# Patient Record
Sex: MALE | Race: WHITE | NOT HISPANIC OR LATINO | Employment: UNEMPLOYED | URBAN - METROPOLITAN AREA
[De-identification: names, ages, dates, MRNs, and addresses within clinical notes are randomized per-mention and may not be internally consistent; named-entity substitution may affect disease eponyms.]

---

## 2023-08-02 ENCOUNTER — OFFICE VISIT (OUTPATIENT)
Dept: GASTROENTEROLOGY | Facility: CLINIC | Age: 58
End: 2023-08-02
Payer: MEDICARE

## 2023-08-02 VITALS
HEIGHT: 70 IN | HEART RATE: 95 BPM | DIASTOLIC BLOOD PRESSURE: 95 MMHG | WEIGHT: 214 LBS | SYSTOLIC BLOOD PRESSURE: 152 MMHG | BODY MASS INDEX: 30.64 KG/M2

## 2023-08-02 DIAGNOSIS — R11.10 REGURGITATION OF FOOD: ICD-10-CM

## 2023-08-02 DIAGNOSIS — K21.9 GASTROESOPHAGEAL REFLUX DISEASE, UNSPECIFIED WHETHER ESOPHAGITIS PRESENT: ICD-10-CM

## 2023-08-02 DIAGNOSIS — Z72.0 TOBACCO ABUSE: ICD-10-CM

## 2023-08-02 DIAGNOSIS — R13.10 DYSPHAGIA, UNSPECIFIED TYPE: Primary | ICD-10-CM

## 2023-08-02 DIAGNOSIS — Z12.11 SCREENING FOR MALIGNANT NEOPLASM OF COLON: ICD-10-CM

## 2023-08-02 PROCEDURE — 1160F RVW MEDS BY RX/DR IN RCRD: CPT | Performed by: NURSE PRACTITIONER

## 2023-08-02 PROCEDURE — 1159F MED LIST DOCD IN RCRD: CPT | Performed by: NURSE PRACTITIONER

## 2023-08-02 PROCEDURE — 99204 OFFICE O/P NEW MOD 45 MIN: CPT | Performed by: NURSE PRACTITIONER

## 2023-08-02 RX ORDER — POLYETHYLENE GLYCOL 3350, SODIUM CHLORIDE, SODIUM BICARBONATE, POTASSIUM CHLORIDE 420; 11.2; 5.72; 1.48 G/4L; G/4L; G/4L; G/4L
4000 POWDER, FOR SOLUTION ORAL ONCE
Qty: 4000 ML | Refills: 0 | Status: SHIPPED | OUTPATIENT
Start: 2023-08-02 | End: 2023-08-02

## 2023-08-02 RX ORDER — ONDANSETRON 4 MG/1
TABLET, ORALLY DISINTEGRATING ORAL
COMMUNITY
Start: 2023-06-26

## 2023-08-02 RX ORDER — ATORVASTATIN CALCIUM 20 MG/1
20 TABLET, FILM COATED ORAL NIGHTLY
COMMUNITY
Start: 2023-05-01

## 2023-08-02 RX ORDER — FUROSEMIDE 40 MG/1
1 TABLET ORAL DAILY
COMMUNITY
Start: 2023-05-01

## 2023-08-02 RX ORDER — PANTOPRAZOLE SODIUM 40 MG/1
40 TABLET, DELAYED RELEASE ORAL
Qty: 60 TABLET | Refills: 2 | Status: SHIPPED | OUTPATIENT
Start: 2023-08-02

## 2023-08-02 RX ORDER — APIXABAN 5 MG/1
1 TABLET, FILM COATED ORAL EVERY 12 HOURS SCHEDULED
COMMUNITY
Start: 2023-05-01

## 2023-08-02 RX ORDER — QUETIAPINE FUMARATE 200 MG/1
TABLET, FILM COATED ORAL
COMMUNITY
Start: 2023-07-21

## 2023-08-02 RX ORDER — ALBUTEROL SULFATE 2.5 MG/3ML
2.5 SOLUTION RESPIRATORY (INHALATION)
COMMUNITY
Start: 2023-05-01 | End: 2024-05-01

## 2023-08-02 RX ORDER — CARVEDILOL 12.5 MG/1
1 TABLET ORAL EVERY 12 HOURS SCHEDULED
COMMUNITY
Start: 2023-05-01

## 2023-08-02 RX ORDER — LISINOPRIL 10 MG/1
1 TABLET ORAL DAILY
COMMUNITY
Start: 2023-05-01

## 2023-08-02 RX ORDER — PANTOPRAZOLE SODIUM 40 MG/1
40 TABLET, DELAYED RELEASE ORAL EVERY MORNING
COMMUNITY
Start: 2023-05-01 | End: 2023-08-02

## 2023-08-11 ENCOUNTER — TELEPHONE (OUTPATIENT)
Dept: GASTROENTEROLOGY | Facility: CLINIC | Age: 58
End: 2023-08-11
Payer: MEDICARE

## 2023-08-11 NOTE — TELEPHONE ENCOUNTER
Attempted to contact pt about possibly moving procedure to 8/16. Left a vm requesting a return call.

## 2023-09-08 ENCOUNTER — APPOINTMENT (OUTPATIENT)
Dept: CT IMAGING | Facility: HOSPITAL | Age: 58
End: 2023-09-08
Payer: MEDICARE

## 2023-09-08 ENCOUNTER — APPOINTMENT (OUTPATIENT)
Dept: GENERAL RADIOLOGY | Facility: HOSPITAL | Age: 58
End: 2023-09-08
Payer: MEDICARE

## 2023-09-08 ENCOUNTER — HOSPITAL ENCOUNTER (OUTPATIENT)
Facility: HOSPITAL | Age: 58
Setting detail: OBSERVATION
Discharge: HOME | End: 2023-09-09
Attending: EMERGENCY MEDICINE | Admitting: INTERNAL MEDICINE
Payer: MEDICARE

## 2023-09-08 DIAGNOSIS — R63.8 UNABLE TO EAT SOLID FOODS: ICD-10-CM

## 2023-09-08 DIAGNOSIS — R11.2 INTRACTABLE NAUSEA AND VOMITING: ICD-10-CM

## 2023-09-08 DIAGNOSIS — E87.6 ACUTE HYPOKALEMIA: ICD-10-CM

## 2023-09-08 DIAGNOSIS — N28.9 ACUTE RENAL INSUFFICIENCY: ICD-10-CM

## 2023-09-08 DIAGNOSIS — K22.2 ESOPHAGEAL STRICTURE: Primary | ICD-10-CM

## 2023-09-08 DIAGNOSIS — Z12.11 SCREENING FOR MALIGNANT NEOPLASM OF COLON: ICD-10-CM

## 2023-09-08 DIAGNOSIS — R63.4 WEIGHT LOSS, UNINTENTIONAL: ICD-10-CM

## 2023-09-08 LAB
ALBUMIN SERPL-MCNC: 4.4 G/DL (ref 3.5–5.2)
ALBUMIN/GLOB SERPL: 1.5 G/DL
ALP SERPL-CCNC: 69 U/L (ref 39–117)
ALT SERPL W P-5'-P-CCNC: 25 U/L (ref 1–41)
ANION GAP SERPL CALCULATED.3IONS-SCNC: 13.8 MMOL/L (ref 5–15)
APTT PPP: 29.5 SECONDS (ref 78–95.9)
AST SERPL-CCNC: 25 U/L (ref 1–40)
BASOPHILS # BLD AUTO: 0.07 10*3/MM3 (ref 0–0.2)
BASOPHILS NFR BLD AUTO: 0.6 % (ref 0–1.5)
BILIRUB SERPL-MCNC: 0.7 MG/DL (ref 0–1.2)
BUN SERPL-MCNC: 36 MG/DL (ref 6–20)
BUN/CREAT SERPL: 22.4 (ref 7–25)
CALCIUM SPEC-SCNC: 9.9 MG/DL (ref 8.6–10.5)
CHLORIDE SERPL-SCNC: 95 MMOL/L (ref 98–107)
CO2 SERPL-SCNC: 27.2 MMOL/L (ref 22–29)
CREAT SERPL-MCNC: 1.61 MG/DL (ref 0.76–1.27)
DEPRECATED RDW RBC AUTO: 44 FL (ref 37–54)
EGFRCR SERPLBLD CKD-EPI 2021: 49 ML/MIN/1.73
EOSINOPHIL # BLD AUTO: 0.17 10*3/MM3 (ref 0–0.4)
EOSINOPHIL NFR BLD AUTO: 1.4 % (ref 0.3–6.2)
ERYTHROCYTE [DISTWIDTH] IN BLOOD BY AUTOMATED COUNT: 13.8 % (ref 12.3–15.4)
GLOBULIN UR ELPH-MCNC: 2.9 GM/DL
GLUCOSE BLDC GLUCOMTR-MCNC: 125 MG/DL (ref 70–99)
GLUCOSE BLDC GLUCOMTR-MCNC: 84 MG/DL (ref 70–99)
GLUCOSE SERPL-MCNC: 116 MG/DL (ref 65–99)
HCT VFR BLD AUTO: 46.3 % (ref 37.5–51)
HGB BLD-MCNC: 16 G/DL (ref 13–17.7)
HOLD SPECIMEN: NORMAL
HOLD SPECIMEN: NORMAL
IMM GRANULOCYTES # BLD AUTO: 0.02 10*3/MM3 (ref 0–0.05)
IMM GRANULOCYTES NFR BLD AUTO: 0.2 % (ref 0–0.5)
INR PPP: 1.06 (ref 0.86–1.15)
LYMPHOCYTES # BLD AUTO: 3.83 10*3/MM3 (ref 0.7–3.1)
LYMPHOCYTES NFR BLD AUTO: 31.3 % (ref 19.6–45.3)
MCH RBC QN AUTO: 29.9 PG (ref 26.6–33)
MCHC RBC AUTO-ENTMCNC: 34.6 G/DL (ref 31.5–35.7)
MCV RBC AUTO: 86.5 FL (ref 79–97)
MONOCYTES # BLD AUTO: 1.42 10*3/MM3 (ref 0.1–0.9)
MONOCYTES NFR BLD AUTO: 11.6 % (ref 5–12)
NEUTROPHILS NFR BLD AUTO: 54.9 % (ref 42.7–76)
NEUTROPHILS NFR BLD AUTO: 6.73 10*3/MM3 (ref 1.7–7)
NRBC BLD AUTO-RTO: 0 /100 WBC (ref 0–0.2)
NT-PROBNP SERPL-MCNC: 8202 PG/ML (ref 0–900)
PLATELET # BLD AUTO: 322 10*3/MM3 (ref 140–450)
PMV BLD AUTO: 11.4 FL (ref 6–12)
POTASSIUM SERPL-SCNC: 3.2 MMOL/L (ref 3.5–5.2)
PROCALCITONIN SERPL-MCNC: 0.08 NG/ML (ref 0–0.25)
PROT SERPL-MCNC: 7.3 G/DL (ref 6–8.5)
PROTHROMBIN TIME: 13.9 SECONDS (ref 11.8–14.9)
RBC # BLD AUTO: 5.35 10*6/MM3 (ref 4.14–5.8)
SODIUM SERPL-SCNC: 136 MMOL/L (ref 136–145)
WBC NRBC COR # BLD: 12.24 10*3/MM3 (ref 3.4–10.8)
WHOLE BLOOD HOLD COAG: NORMAL
WHOLE BLOOD HOLD SPECIMEN: NORMAL

## 2023-09-08 PROCEDURE — 71260 CT THORAX DX C+: CPT

## 2023-09-08 PROCEDURE — G0378 HOSPITAL OBSERVATION PER HR: HCPCS

## 2023-09-08 PROCEDURE — 96365 THER/PROPH/DIAG IV INF INIT: CPT

## 2023-09-08 PROCEDURE — 84145 PROCALCITONIN (PCT): CPT | Performed by: INTERNAL MEDICINE

## 2023-09-08 PROCEDURE — 71045 X-RAY EXAM CHEST 1 VIEW: CPT

## 2023-09-08 PROCEDURE — 96366 THER/PROPH/DIAG IV INF ADDON: CPT

## 2023-09-08 PROCEDURE — 80053 COMPREHEN METABOLIC PANEL: CPT

## 2023-09-08 PROCEDURE — 82948 REAGENT STRIP/BLOOD GLUCOSE: CPT

## 2023-09-08 PROCEDURE — 85025 COMPLETE CBC W/AUTO DIFF WBC: CPT

## 2023-09-08 PROCEDURE — 99284 EMERGENCY DEPT VISIT MOD MDM: CPT

## 2023-09-08 PROCEDURE — 0 POTASSIUM CHLORIDE 10 MEQ/100ML SOLUTION: Performed by: INTERNAL MEDICINE

## 2023-09-08 PROCEDURE — 83880 ASSAY OF NATRIURETIC PEPTIDE: CPT | Performed by: NURSE PRACTITIONER

## 2023-09-08 PROCEDURE — 85730 THROMBOPLASTIN TIME PARTIAL: CPT

## 2023-09-08 PROCEDURE — 85610 PROTHROMBIN TIME: CPT

## 2023-09-08 PROCEDURE — 25510000001 IOPAMIDOL PER 1 ML: Performed by: EMERGENCY MEDICINE

## 2023-09-08 RX ORDER — DEXTROSE MONOHYDRATE 25 G/50ML
25 INJECTION, SOLUTION INTRAVENOUS
Status: DISCONTINUED | OUTPATIENT
Start: 2023-09-08 | End: 2023-09-09 | Stop reason: HOSPADM

## 2023-09-08 RX ORDER — POTASSIUM CHLORIDE 7.45 MG/ML
10 INJECTION INTRAVENOUS
Status: COMPLETED | OUTPATIENT
Start: 2023-09-08 | End: 2023-09-09

## 2023-09-08 RX ORDER — SODIUM CHLORIDE 9 MG/ML
40 INJECTION, SOLUTION INTRAVENOUS AS NEEDED
Status: DISCONTINUED | OUTPATIENT
Start: 2023-09-08 | End: 2023-09-09 | Stop reason: HOSPADM

## 2023-09-08 RX ORDER — NICOTINE 21 MG/24HR
1 PATCH, TRANSDERMAL 24 HOURS TRANSDERMAL
Status: DISCONTINUED | OUTPATIENT
Start: 2023-09-08 | End: 2023-09-09 | Stop reason: HOSPADM

## 2023-09-08 RX ORDER — INSULIN LISPRO 100 [IU]/ML
2-7 INJECTION, SOLUTION INTRAVENOUS; SUBCUTANEOUS
Status: DISCONTINUED | OUTPATIENT
Start: 2023-09-08 | End: 2023-09-09 | Stop reason: HOSPADM

## 2023-09-08 RX ORDER — SODIUM CHLORIDE 0.9 % (FLUSH) 0.9 %
10 SYRINGE (ML) INJECTION EVERY 12 HOURS SCHEDULED
Status: DISCONTINUED | OUTPATIENT
Start: 2023-09-08 | End: 2023-09-09 | Stop reason: HOSPADM

## 2023-09-08 RX ORDER — ACETAMINOPHEN 325 MG/1
650 TABLET ORAL EVERY 4 HOURS PRN
Status: DISCONTINUED | OUTPATIENT
Start: 2023-09-08 | End: 2023-09-09 | Stop reason: HOSPADM

## 2023-09-08 RX ORDER — PANTOPRAZOLE SODIUM 40 MG/10ML
40 INJECTION, POWDER, LYOPHILIZED, FOR SOLUTION INTRAVENOUS
Status: DISCONTINUED | OUTPATIENT
Start: 2023-09-09 | End: 2023-09-09

## 2023-09-08 RX ORDER — SODIUM CHLORIDE, SODIUM LACTATE, POTASSIUM CHLORIDE, CALCIUM CHLORIDE 600; 310; 30; 20 MG/100ML; MG/100ML; MG/100ML; MG/100ML
50 INJECTION, SOLUTION INTRAVENOUS CONTINUOUS
Status: ACTIVE | OUTPATIENT
Start: 2023-09-08 | End: 2023-09-09

## 2023-09-08 RX ORDER — SODIUM CHLORIDE, SODIUM LACTATE, POTASSIUM CHLORIDE, CALCIUM CHLORIDE 600; 310; 30; 20 MG/100ML; MG/100ML; MG/100ML; MG/100ML
50 INJECTION, SOLUTION INTRAVENOUS CONTINUOUS
Status: DISCONTINUED | OUTPATIENT
Start: 2023-09-08 | End: 2023-09-08

## 2023-09-08 RX ORDER — NICOTINE POLACRILEX 4 MG
15 LOZENGE BUCCAL
Status: DISCONTINUED | OUTPATIENT
Start: 2023-09-08 | End: 2023-09-09 | Stop reason: HOSPADM

## 2023-09-08 RX ORDER — ONDANSETRON 2 MG/ML
4 INJECTION INTRAMUSCULAR; INTRAVENOUS EVERY 6 HOURS PRN
Status: DISCONTINUED | OUTPATIENT
Start: 2023-09-08 | End: 2023-09-09 | Stop reason: HOSPADM

## 2023-09-08 RX ORDER — ENOXAPARIN SODIUM 100 MG/ML
40 INJECTION SUBCUTANEOUS NIGHTLY
Status: DISCONTINUED | OUTPATIENT
Start: 2023-09-08 | End: 2023-09-09 | Stop reason: HOSPADM

## 2023-09-08 RX ORDER — SODIUM CHLORIDE 0.9 % (FLUSH) 0.9 %
10 SYRINGE (ML) INJECTION AS NEEDED
Status: DISCONTINUED | OUTPATIENT
Start: 2023-09-08 | End: 2023-09-09 | Stop reason: HOSPADM

## 2023-09-08 RX ADMIN — POTASSIUM CHLORIDE 10 MEQ: 7.46 INJECTION, SOLUTION INTRAVENOUS at 22:21

## 2023-09-08 RX ADMIN — POTASSIUM CHLORIDE 10 MEQ: 7.46 INJECTION, SOLUTION INTRAVENOUS at 23:53

## 2023-09-08 RX ADMIN — IOPAMIDOL 100 ML: 755 INJECTION, SOLUTION INTRAVENOUS at 16:37

## 2023-09-08 RX ADMIN — SODIUM CHLORIDE, POTASSIUM CHLORIDE, SODIUM LACTATE AND CALCIUM CHLORIDE 50 ML/HR: 600; 310; 30; 20 INJECTION, SOLUTION INTRAVENOUS at 19:51

## 2023-09-08 RX ADMIN — SODIUM CHLORIDE 1000 ML: 9 INJECTION, SOLUTION INTRAVENOUS at 15:55

## 2023-09-08 RX ADMIN — NICOTINE 1 PATCH: 21 PATCH, EXTENDED RELEASE TRANSDERMAL at 22:25

## 2023-09-08 RX ADMIN — POTASSIUM CHLORIDE 10 MEQ: 7.46 INJECTION, SOLUTION INTRAVENOUS at 19:52

## 2023-09-08 NOTE — ED PROVIDER NOTES
Time: 3:33 PM EDT  Date of encounter:  9/8/2023  Independent Historian/Clinical History and Information was obtained by:   Patient    History is limited by: N/A    Chief Complaint: Significant weight loss in 3 months      History of Present Illness:  Patient is a 58 y.o. year old male with history of previous MI and CHF on Lasix, who presents to the emergency department for evaluation of significant unintended weight loss of about 55 pounds over the past 3 months.      It sounds like over the past 3 months he has had noticed difficulty swallowing, initially starting with foods and now progressing where he can even keep liquids down and regurgitates them right back up.    He has a scheduled upper endoscopy but not for another month and he is losing weight very rapidly now and feeling very weak and fatigued.    He denies any recent illness or fevers or night sweats or nausea or vomiting or diarrhea.    No new productive cough or illness or new medications.    HPI    Patient Care Team  Primary Care Provider: Shyanne Grijalva APRN    Past Medical History:     No Known Allergies  Past Medical History:   Diagnosis Date    Congestive heart failure (CHF)     Heart attack      Past Surgical History:   Procedure Laterality Date    CARDIAC CATHETERIZATION       History reviewed. No pertinent family history.    Home Medications:  Prior to Admission medications    Medication Sig Start Date End Date Taking? Authorizing Provider   atorvastatin (LIPITOR) 20 MG tablet Take 1 tablet by mouth Every Night. 5/1/23  Yes ProviderJesse MD   carvedilol (COREG) 12.5 MG tablet Take 1 tablet by mouth Every 12 (Twelve) Hours. 5/1/23  Yes ProviderJesse MD   Eliquis 5 MG tablet tablet Take 1 tablet by mouth Every 12 (Twelve) Hours. 5/1/23  Yes ProviderJesse MD   furosemide (LASIX) 40 MG tablet Take 1 tablet by mouth Daily. 5/1/23  Yes ProviderJesse MD   lisinopril (PRINIVIL,ZESTRIL) 10 MG tablet Take 1 tablet by  "mouth Daily. 5/1/23  Yes Jesse Queen MD   ondansetron ODT (ZOFRAN-ODT) 4 MG disintegrating tablet dissolve 1 tablet on the tongue every 8 hours as needed for nausea 6/26/23  Yes Jesse Queen MD   pantoprazole (PROTONIX) 40 MG EC tablet Take 1 tablet by mouth 2 (Two) Times a Day Before Meals. 8/2/23  Yes Daxa Sanchez APRN   QUEtiapine (SEROquel) 200 MG tablet TAKE 1 TABLET BY MOUTH EVERY NIGHT. REPLACE 100 MG DOSE 7/21/23  Yes Jesse Queen MD   albuterol (PROVENTIL) (2.5 MG/3ML) 0.083% nebulizer solution Inhale 2.5 mg. 5/1/23 5/1/24  Jesse Queen MD   metFORMIN (GLUCOPHAGE) 500 MG tablet Take 1 tablet by mouth 2 (Two) Times a Day With Meals.    ProviderJesse MD        Social History:   Social History     Tobacco Use    Smoking status: Every Day     Packs/day: 0.50     Types: Cigarettes    Smokeless tobacco: Never   Vaping Use    Vaping Use: Never used   Substance Use Topics    Alcohol use: Not Currently    Drug use: Not Currently     Types: Cocaine(coke), Marijuana         Review of Systems:  Review of Systems   I performed a 10 point review of systems which was all negative, except for the positives found in the HPI above.  Physical Exam:  /98 (BP Location: Left arm, Patient Position: Lying)   Pulse 93   Temp 97.4 °F (36.3 °C) (Oral)   Resp 18   Ht 177.8 cm (70\")   Wt 84.6 kg (186 lb 8.2 oz)   SpO2 99%   BMI 26.76 kg/m²         Physical Exam   General: Awake alert and in no obvious distress, looks to be feeling generally unwell or weak    HEENT: Head normocephalic atraumatic, eyes PERRLA EOMI, nose normal, oropharynx normal.    Neck: Supple full range of motion, no meningismus, no lymphadenopathy    Heart: Regular rate and rhythm, no murmurs or rubs, 2+ radial pulses bilaterally    Lungs: Clear to auscultation bilaterally without wheezes or crackles, no respiratory distress    Abdomen: Soft, nontender, nondistended, no rebound or guarding    Skin: " Warm, dry, no rash    Musculoskeletal: Normal range of motion, no lower extremity edema    Neurologic: Oriented x3, no motor deficits no sensory deficits    Psychiatric: Mood appears stable, no psychosis            Procedures:  Procedures      Medical Decision Making:      Comorbidities that affect care:    Congestive Heart Failure, Coronary Artery Disease, Smoking    External Notes reviewed:    Previous Labs: I reviewed his previous labs this is year and he had a completely normal creatinine of 0.9, as compared to today's elevated value.      The following orders were placed and all results were independently analyzed by me:  Orders Placed This Encounter   Procedures    XR Chest 1 View    Comprehensive Metabolic Panel    Protime-INR    aPTT    [place] Draw    CBC Auto Differential    BNP    NPO Diet NPO Type: Strict NPO    Gastroenterology (on-call MD unless specified)    Hospitalist (on-call MD unless specified)    POC Glucose Once    CBC & Differential    Green Top (Gel)    Lavender Top    Gold Top - SST    Light Blue Top       Medications Given in the Emergency Department:  Medications   sodium chloride 0.9 % bolus 1,000 mL (1,000 mL Intravenous New Bag 9/8/23 1555)        ED Course:         Labs:    Lab Results (last 24 hours)       Procedure Component Value Units Date/Time    POC Glucose Once [[id number]]  (Abnormal) Collected: 09/08/23 1209    Specimen: Blood Updated: 09/08/23 1210     Glucose 125 mg/dL      Comment: Serial Number: [id number]:  [id number]       CBC & Differential [[id number]]  (Abnormal) Collected: 09/08/23 1301    Specimen: Blood Updated: 09/08/23 1310    Narrative:      The following orders were created for panel order CBC & Differential.  Procedure                               Abnormality         Status                     ---------                               -----------         ------                     CBC Auto Differential[[id number]]        Abnormal            Final result                  Please view results for these tests on the individual orders.    Comprehensive Metabolic Panel [[id number]]  (Abnormal) Collected: 09/08/23 1301    Specimen: Blood Updated: 09/08/23 1331     Glucose 116 mg/dL      BUN 36 mg/dL      Creatinine 1.61 mg/dL      Sodium 136 mmol/L      Potassium 3.2 mmol/L      Chloride 95 mmol/L      CO2 27.2 mmol/L      Calcium 9.9 mg/dL      Total Protein 7.3 g/dL      Albumin 4.4 g/dL      ALT (SGPT) 25 U/L      AST (SGOT) 25 U/L      Alkaline Phosphatase 69 U/L      Total Bilirubin 0.7 mg/dL      Globulin 2.9 gm/dL      A/G Ratio 1.5 g/dL      BUN/Creatinine Ratio 22.4     Anion Gap 13.8 mmol/L      eGFR 49.0 mL/min/1.73     Narrative:      GFR Normal >60  Chronic Kidney Disease <60  Kidney Failure <15      Protime-INR [[id number]]  (Normal) Collected: 09/08/23 1301    Specimen: Blood Updated: 09/08/23 1330     Protime 13.9 Seconds      INR 1.06    Narrative:      Suggested Therapeutic Ranges For Oral Anticoagulant Therapy:  Level of Therapy                      INR Target Range  Standard Dose                            2.0-3.0  High Dose                                2.5-3.5  Patients not receiving anticoagulant  Therapy Normal Range                     0.86-1.15    aPTT [[id number]]  (Abnormal) Collected: 09/08/23 1301    Specimen: Blood Updated: 09/08/23 1330     PTT 29.5 seconds     CBC Auto Differential [[id number]]  (Abnormal) Collected: 09/08/23 1301    Specimen: Blood Updated: 09/08/23 1310     WBC 12.24 10*3/mm3      RBC 5.35 10*6/mm3      Hemoglobin 16.0 g/dL      Hematocrit 46.3 %      MCV 86.5 fL      MCH 29.9 pg      MCHC 34.6 g/dL      RDW 13.8 %      RDW-SD 44.0 fl      MPV 11.4 fL      Platelets 322 10*3/mm3      Neutrophil % 54.9 %      Lymphocyte % 31.3 %      Monocyte % 11.6 %      Eosinophil % 1.4 %      Basophil % 0.6 %      Immature Grans % 0.2 %      Neutrophils, Absolute 6.73 10*3/mm3      Lymphocytes, Absolute 3.83 10*3/mm3      Monocytes, Absolute  1.42 10*3/mm3      Eosinophils, Absolute 0.17 10*3/mm3      Basophils, Absolute 0.07 10*3/mm3      Immature Grans, Absolute 0.02 10*3/mm3      nRBC 0.0 /100 WBC     BNP [[id number]]  (Abnormal) Collected: 09/08/23 1301    Specimen: Blood Updated: 09/08/23 1508     proBNP 8,202.0 pg/mL     Narrative:      Among patients with dyspnea, NT-proBNP is highly sensitive for the detection of acute congestive heart failure. In addition NT-proBNP of <300 pg/ml effectively rules out acute congestive heart failure with 99% negative predictive value.               Imaging:    XR Chest 1 View    Result Date: 9/8/2023  PROCEDURE: XR CHEST 1 VW  COMPARISON: None  INDICATIONS: problem swallowing/ Weight loss  FINDINGS:  The lungs are under inflated bibasilar densities over to represent atelectasis.  Remainder lung fields are grossly clear.  There are no prior studies for comparison.  There is pulmonary vascular crowding.  Calcification in the aortic arch is present.  Heart size is normal.  Trachea is midline.  No pneumothorax or pleural effusions.  Visualized bony structures are intact.       Under inflated lungs with bibasilar atelectasis.  No acute cardiopulmonary process.       SAE DREW DO       Electronically Signed and Approved By: SAE DREW DO on 9/08/2023 at 13:26                Differential Diagnosis and Discussion:    Dysphagia or difficulty swallowing such as esophageal stricture or mass, less likely to be stroke related    All labs were reviewed and interpreted by me.  All X-rays impressions were independently interpreted by me.    MDM             This patient is a 58-year-old male who presents with gradually worsening dysphagia or unable to tolerate solids and now not even liquids and started feel weak and dehydrated.    Lab work reflects mildly elevated white blood cell count but also acute renal insufficiency with new rise in his creatinine compared to old labs at outside facility.    Per family member he has  had close to 60 pound weight loss in the past few months and gradually progressing inability to swallow foods and now even liquids.    I consulted with her gastroenterologist, Dr. Stein, who recommends n.p.o. after midnight and will plan for upper endoscopy in the morning to evaluate further.    There was discussion of CT imaging of the chest with contrast but we are going to hydrate him a bit first, given his acute renal insufficiency.            Patient Care Considerations:          Consultants/Shared Management Plan:    Hospitalist: I have discussed the case with the admitting hospitalist, who agrees to accept the patient for admission.  Consultant: I have discussed the case with our on-call gastroenterologist, who agrees to consult on the patient.    Social Determinants of Health:    Patient is independent, reliable, and has access to care.       Disposition and Care Coordination:    Admit:   Through independent evaluation of the patient's history, physical, and imperical data, the patient meets criteria for observation/admission to the hospital.        Final diagnoses:   Esophageal stricture   Unable to eat solid foods   Acute renal insufficiency   Acute hypokalemia   Weight loss, unintentional        ED Disposition       ED Disposition   Decision to Admit    Condition   --    Comment   --               This medical record created using voice recognition software.             Rohan Plascencia MD  09/08/23 [id number]

## 2023-09-08 NOTE — PAYOR COMM NOTE
"Ricky Vang (58 y.o. Male)       Date of Birth   1965    Social Security Number       Address   [address] [place], [place] [address]    Home Phone   [phone]    MRN   [id number]       [employer]   None    Marital Status                               Admission Date   9/8/23    Admission Type   Emergency    Admitting Provider   Paco Davis MD    Attending Provider   Paco Davis MD    Department, Room/Bed   [address], 3004/1       Discharge Date       Discharge Disposition       Discharge Destination                                 Attending Provider: Paco Davis MD    Allergies: No Known Allergies    Isolation: None   Infection: None   Code Status: CPR    Ht: 177.8 cm (70\")   Wt: 84.6 kg (186 lb 8.2 oz)    Admission Cmt: None   Principal Problem: Intractable nausea and vomiting [R11.2]                   Active Insurance as of 9/8/2023       Primary Coverage       Payor Plan Insurance Group Employer/Plan Group    [facility] MEDICARE REPLACEMENT [facility] MEDICARE REPLACEMENT [id number]       Payor Plan Address Payor Plan Phone Number Payor Plan Fax Number Effective Dates    [address] [address]   5/1/2023 - None Entered    [facility] [id number]         Subscriber Name Subscriber Birth Date Member ID       RICKY VANG 1965 [id number]               Secondary Coverage       Payor Plan Insurance Group Employer/Plan Group    [employer] [place] [employer]        Payor Plan Address Payor Plan Phone Number Payor Plan Fax Number Effective Dates    [address] [address] [phone]  9/8/2023 - None Entered    [facility] [id number]         Subscriber Name Subscriber Birth Date Member ID       RICKY VANG 1965 [id number]                     Emergency Contacts        (Rel.) Home Phone Work Phone Mobile Phone    VANESSA VANG (Spouse) [phone] -- --                 History & Physical        Paco Davis MD at 09/08/23 [address] [place] [address] "   HOSPITALIST HISTORY AND PHYSICAL  Date: 2023   Patient Name: Shashank Saunders  : 1965  MRN: [id number]  Primary Care Physician:  Shyanne Grijalva APRN  Date of admission: 2023    Subjective   Subjective     Chief Complaint: Inability to eat    HPI:    Shashank Saunders is a 58 y.o. male past medical history of coronary disease status post MI in 2013, hypertension, type 2 diabetes, systolic heart failure but not on Lasix, and dyslipidemia who presents due to inability to tolerate solid    The patient states that he is lost 40+ pounds over the last 2 to 3 months.  Initially started out where he could not really eat solids and is progressed to where now it is difficult for him to get liquids.  No fevers.  No chills.  No leg swelling.  No shortness of breath.  Has not been able to eat or drink very much.  He comes ER for further evaluation.    In the emergency department the patient's vital signs were Sulster/1.4, pulse 93, respiratory is 18, blood pressure 131/98, 99% on room air.  CBC shows a white count of 12,000.  CMP shows a potassium of 3.2, creatinine to 1.61, glucose of 116.  Chest x-ray shows no concerning findings.  Gastroenterology was consulted the patient was in the hospital for evaluation of inability to tolerate solids or liquids.    All systems reviewed abnormalities noted above    Personal History     Past Medical History:  Dyslipidemia  Systolic heart failure with unknown EF  GERD  Type 2 diabetes  Hypertension  Artery disease with history of MI  Bipolar 1  Asthma/COPD    Past Surgical History:  Catheterization    Family History:   No known family history of esophageal cancer    Social History:   Smokes every day.  No alcohol use.  Previous history of polysubstance abuse      Home Medications:  QUEtiapine, albuterol, apixaban, atorvastatin, carvedilol, furosemide, lisinopril, metFORMIN, ondansetron ODT, and pantoprazole    Allergies:  No Known Allergies        Objective   Objective      Vitals:   Temp:  [97.4 °F (36.3 °C)] 97.4 °F (36.3 °C)  Heart Rate:  [83-94] 93  Resp:  [18] 18  BP: (151)/(98) 151/98    Physical Exam    Constitutional: Awake, alert, no acute distress   Eyes: Pupils equal, sclerae anicteric, no conjunctival injection   HENT: NCAT, mucous membranes moist   Neck: Supple, no thyromegaly, no lymphadenopathy, trachea midline   Respiratory: Clear to auscultation bilaterally, nonlabored respirations    Cardiovascular: RRR, no murmurs, rubs, or gallops, palpable pedal pulses bilaterally   Gastrointestinal: Positive bowel sounds, soft, nontender, nondistended   Musculoskeletal: No bilateral ankle edema, no clubbing or cyanosis to extremities   Psychiatric: Appropriate affect, cooperative   Neurologic: Oriented x 3, strength symmetric in all extremities, Cranial Nerves grossly intact to confrontation, speech clear   Skin: No rashes     Result Review    Result Review:  I have personally reviewed the results from the time of this admission to 9/8/2023 15:59 EDT and agree with these findings:  Creatinine is 1.6  Potassium is 3.2    Assessment & Plan   Assessment / Plan     Assessment/Plan:   Inability to tolerate solids  Decreasing ability to tolerate liquids  Suspected achalasia versus pseudo achalasia from malignancy  Significant weight loss  Systolic heart failure not on Lasix  Hypokalemia  Acute kidney injury with baseline creatinine 1.3 currently 1.6  Elevated BNP but does not appear fluid overload    --Admit to hospital service  --Consult gastroenterology  --Clear liquid diet  -- N.p.o. at midnight  -- Hold anticoagulation  -- CT scan of the chest to rule out malignant lesion  -- Lactated Ringer's at 50 mils an hour for 15 hours even though he has a BNP of 8000 I think this is most likely due to his kidney function as he has no signs or symptoms of fluid overload and he actually has a small bump in his creatinine.  This coincides with history of not being able to eat or  drink.  --Sliding scale for type 2 diabetes      DVT prophylaxis:  Hold anticoagulation  CODE STATUS:     Full code    Admission Status:  I believe this patient meets observation status.    Electronically signed by Paco Davis MD, 09/08/23, 3:59 PM EDT.             Electronically signed by Paco Davis MD at 09/08/23 1728          Emergency Department Notes        Rohan Plascencia MD at 09/08/23 1533          Time: 3:33 PM EDT  Date of encounter:  9/8/2023  Independent Historian/Clinical History and Information was obtained by:   Patient    History is limited by: N/A    Chief Complaint: Significant weight loss in 3 months      History of Present Illness:  Patient is a 58 y.o. year old male with history of previous MI and CHF on Lasix, who presents to the emergency department for evaluation of significant unintended weight loss of about 55 pounds over the past 3 months.      It sounds like over the past 3 months he has had noticed difficulty swallowing, initially starting with foods and now progressing where he can even keep liquids down and regurgitates them right back up.    He has a scheduled upper endoscopy but not for another month and he is losing weight very rapidly now and feeling very weak and fatigued.    He denies any recent illness or fevers or night sweats or nausea or vomiting or diarrhea.    No new productive cough or illness or new medications.    HPI    Patient Care Team  Primary Care Provider: Shyanne Grijalva APRN    Past Medical History:     No Known Allergies  Past Medical History:   Diagnosis Date    Congestive heart failure (CHF)     Heart attack      Past Surgical History:   Procedure Laterality Date    CARDIAC CATHETERIZATION       History reviewed. No pertinent family history.    Home Medications:  Prior to Admission medications    Medication Sig Start Date End Date Taking? Authorizing Provider   atorvastatin (LIPITOR) 20 MG tablet Take 1 tablet by mouth Every Night. 5/1/23  Yes Provider,  "MD Jesse   carvedilol (COREG) 12.5 MG tablet Take 1 tablet by mouth Every 12 (Twelve) Hours. 5/1/23  Yes Jesse Queen MD   Eliquis 5 MG tablet tablet Take 1 tablet by mouth Every 12 (Twelve) Hours. 5/1/23  Yes Jesse Queen MD   furosemide (LASIX) 40 MG tablet Take 1 tablet by mouth Daily. 5/1/23  Yes Jesse Queen MD   lisinopril (PRINIVIL,ZESTRIL) 10 MG tablet Take 1 tablet by mouth Daily. 5/1/23  Yes Jesse Queen MD   ondansetron ODT (ZOFRAN-ODT) 4 MG disintegrating tablet dissolve 1 tablet on the tongue every 8 hours as needed for nausea 6/26/23  Yes Jesse Queen MD   pantoprazole (PROTONIX) 40 MG EC tablet Take 1 tablet by mouth 2 (Two) Times a Day Before Meals. 8/2/23  Yes Daxa Sanchez APRN   QUEtiapine (SEROquel) 200 MG tablet TAKE 1 TABLET BY MOUTH EVERY NIGHT. REPLACE 100 MG DOSE 7/21/23  Yes Jesse Queen MD   albuterol (PROVENTIL) (2.5 MG/3ML) 0.083% nebulizer solution Inhale 2.5 mg. 5/1/23 5/1/24  Jesse Queen MD   metFORMIN (GLUCOPHAGE) 500 MG tablet Take 1 tablet by mouth 2 (Two) Times a Day With Meals.    Jesse Queen MD        Social History:   Social History     Tobacco Use    Smoking status: Every Day     Packs/day: 0.50     Types: Cigarettes    Smokeless tobacco: Never   Vaping Use    Vaping Use: Never used   Substance Use Topics    Alcohol use: Not Currently    Drug use: Not Currently     Types: Cocaine(coke), Marijuana         Review of Systems:  Review of Systems   I performed a 10 point review of systems which was all negative, except for the positives found in the HPI above.  Physical Exam:  /98 (BP Location: Left arm, Patient Position: Lying)   Pulse 93   Temp 97.4 °F (36.3 °C) (Oral)   Resp 18   Ht 177.8 cm (70\")   Wt 84.6 kg (186 lb 8.2 oz)   SpO2 99%   BMI 26.76 kg/m²         Physical Exam   General: Awake alert and in no obvious distress, looks to be feeling generally unwell or " weak    HEENT: Head normocephalic atraumatic, eyes PERRLA EOMI, nose normal, oropharynx normal.    Neck: Supple full range of motion, no meningismus, no lymphadenopathy    Heart: Regular rate and rhythm, no murmurs or rubs, 2+ radial pulses bilaterally    Lungs: Clear to auscultation bilaterally without wheezes or crackles, no respiratory distress    Abdomen: Soft, nontender, nondistended, no rebound or guarding    Skin: Warm, dry, no rash    Musculoskeletal: Normal range of motion, no lower extremity edema    Neurologic: Oriented x3, no motor deficits no sensory deficits    Psychiatric: Mood appears stable, no psychosis            Procedures:  Procedures      Medical Decision Making:      Comorbidities that affect care:    Congestive Heart Failure, Coronary Artery Disease, Smoking    External Notes reviewed:    Previous Labs: I reviewed his previous labs this is year and he had a completely normal creatinine of 0.9, as compared to today's elevated value.      The following orders were placed and all results were independently analyzed by me:  Orders Placed This Encounter   Procedures    XR Chest 1 View    Comprehensive Metabolic Panel    Protime-INR    aPTT    [place] Draw    CBC Auto Differential    BNP    NPO Diet NPO Type: Strict NPO    Gastroenterology (on-call MD unless specified)    Hospitalist (on-call MD unless specified)    POC Glucose Once    CBC & Differential    Green Top (Gel)    Lavender Top    Gold Top - SST    Light Blue Top       Medications Given in the Emergency Department:  Medications   sodium chloride 0.9 % bolus 1,000 mL (1,000 mL Intravenous New Bag 9/8/23 1555)        ED Course:         Labs:    Lab Results (last 24 hours)       Procedure Component Value Units Date/Time    POC Glucose Once [[id number]]  (Abnormal) Collected: 09/08/23 1209    Specimen: Blood Updated: 09/08/23 1210     Glucose 125 mg/dL      Comment: Serial Number: [id number]:  [id number]       CBC & Differential  [[id number]]  (Abnormal) Collected: 09/08/23 1301    Specimen: Blood Updated: 09/08/23 1310    Narrative:      The following orders were created for panel order CBC & Differential.  Procedure                               Abnormality         Status                     ---------                               -----------         ------                     CBC Auto Differential[[id number]]        Abnormal            Final result                 Please view results for these tests on the individual orders.    Comprehensive Metabolic Panel [[id number]]  (Abnormal) Collected: 09/08/23 1301    Specimen: Blood Updated: 09/08/23 1331     Glucose 116 mg/dL      BUN 36 mg/dL      Creatinine 1.61 mg/dL      Sodium 136 mmol/L      Potassium 3.2 mmol/L      Chloride 95 mmol/L      CO2 27.2 mmol/L      Calcium 9.9 mg/dL      Total Protein 7.3 g/dL      Albumin 4.4 g/dL      ALT (SGPT) 25 U/L      AST (SGOT) 25 U/L      Alkaline Phosphatase 69 U/L      Total Bilirubin 0.7 mg/dL      Globulin 2.9 gm/dL      A/G Ratio 1.5 g/dL      BUN/Creatinine Ratio 22.4     Anion Gap 13.8 mmol/L      eGFR 49.0 mL/min/1.73     Narrative:      GFR Normal >60  Chronic Kidney Disease <60  Kidney Failure <15      Protime-INR [[id number]]  (Normal) Collected: 09/08/23 1301    Specimen: Blood Updated: 09/08/23 1330     Protime 13.9 Seconds      INR 1.06    Narrative:      Suggested Therapeutic Ranges For Oral Anticoagulant Therapy:  Level of Therapy                      INR Target Range  Standard Dose                            2.0-3.0  High Dose                                2.5-3.5  Patients not receiving anticoagulant  Therapy Normal Range                     0.86-1.15    aPTT [[id number]]  (Abnormal) Collected: 09/08/23 1301    Specimen: Blood Updated: 09/08/23 1330     PTT 29.5 seconds     CBC Auto Differential [[id number]]  (Abnormal) Collected: 09/08/23 1301    Specimen: Blood Updated: 09/08/23 1310     WBC 12.24 10*3/mm3      RBC 5.35 10*6/mm3       Hemoglobin 16.0 g/dL      Hematocrit 46.3 %      MCV 86.5 fL      MCH 29.9 pg      MCHC 34.6 g/dL      RDW 13.8 %      RDW-SD 44.0 fl      MPV 11.4 fL      Platelets 322 10*3/mm3      Neutrophil % 54.9 %      Lymphocyte % 31.3 %      Monocyte % 11.6 %      Eosinophil % 1.4 %      Basophil % 0.6 %      Immature Grans % 0.2 %      Neutrophils, Absolute 6.73 10*3/mm3      Lymphocytes, Absolute 3.83 10*3/mm3      Monocytes, Absolute 1.42 10*3/mm3      Eosinophils, Absolute 0.17 10*3/mm3      Basophils, Absolute 0.07 10*3/mm3      Immature Grans, Absolute 0.02 10*3/mm3      nRBC 0.0 /100 WBC     BNP [[id number]]  (Abnormal) Collected: 09/08/23 1301    Specimen: Blood Updated: 09/08/23 1508     proBNP 8,202.0 pg/mL     Narrative:      Among patients with dyspnea, NT-proBNP is highly sensitive for the detection of acute congestive heart failure. In addition NT-proBNP of <300 pg/ml effectively rules out acute congestive heart failure with 99% negative predictive value.               Imaging:    XR Chest 1 View    Result Date: 9/8/2023  PROCEDURE: XR CHEST 1 VW  COMPARISON: None  INDICATIONS: problem swallowing/ Weight loss  FINDINGS:  The lungs are under inflated bibasilar densities over to represent atelectasis.  Remainder lung fields are grossly clear.  There are no prior studies for comparison.  There is pulmonary vascular crowding.  Calcification in the aortic arch is present.  Heart size is normal.  Trachea is midline.  No pneumothorax or pleural effusions.  Visualized bony structures are intact.       Under inflated lungs with bibasilar atelectasis.  No acute cardiopulmonary process.       SAE DREW DO       Electronically Signed and Approved By: SAE DREW DO on 9/08/2023 at 13:26                Differential Diagnosis and Discussion:    Dysphagia or difficulty swallowing such as esophageal stricture or mass, less likely to be stroke related    All labs were reviewed and interpreted by me.  All X-rays  impressions were independently interpreted by me.    MDM             This patient is a 58-year-old male who presents with gradually worsening dysphagia or unable to tolerate solids and now not even liquids and started feel weak and dehydrated.    Lab work reflects mildly elevated white blood cell count but also acute renal insufficiency with new rise in his creatinine compared to old labs at outside facility.    Per family member he has had close to 60 pound weight loss in the past few months and gradually progressing inability to swallow foods and now even liquids.    I consulted with her gastroenterologist, Dr. Stein, who recommends n.p.o. after midnight and will plan for upper endoscopy in the morning to evaluate further.    There was discussion of CT imaging of the chest with contrast but we are going to hydrate him a bit first, given his acute renal insufficiency.            Patient Care Considerations:          Consultants/Shared Management Plan:    Hospitalist: I have discussed the case with the admitting hospitalist, who agrees to accept the patient for admission.  Consultant: I have discussed the case with our on-call gastroenterologist, who agrees to consult on the patient.    Social Determinants of Health:    Patient is independent, reliable, and has access to care.       Disposition and Care Coordination:    Admit:   Through independent evaluation of the patient's history, physical, and imperical data, the patient meets criteria for observation/admission to the hospital.        Final diagnoses:   Esophageal stricture   Unable to eat solid foods   Acute renal insufficiency   Acute hypokalemia   Weight loss, unintentional        ED Disposition       ED Disposition   Decision to Admit    Condition   --    Comment   --               This medical record created using voice recognition software.             Rohan Plascencia MD  09/08/23 5832      Electronically signed by Rohan Plascencia MD at 09/08/23 6051    "    Orders (active)        Start     Ordered    09/09/23 0600  pantoprazole (PROTONIX) injection 40 mg  Every Early Morning         09/08/23 1726    09/09/23 0001  NPO Diet NPO Type: Strict NPO  Diet Effective Midnight         09/08/23 1549    09/08/23 2200  POC Glucose 4x Daily Before Meals & at Bedtime  4 Times Daily Before Meals & at Bedtime      Comments: Complete no more than 45 minutes prior to patient eating      09/08/23 1728    09/08/23 1929  Discontinue Insulin Infusion at Specified Time After Basal Dose Administered  Once        Comments: Discontinue Insulin Infusion at Specified Time After Basal Dose Administered    09/08/23 1728    09/08/23 1929  Discontinue Glucommander IV Insulin Order Set After Transition Complete  Once        Comments: Discontinue Glucommander IV Insulin Order Set After Transition Complete    09/08/23 1728    09/08/23 1745  lactated ringers infusion  Continuous         09/08/23 1716    09/08/23 1745  potassium chloride 10 mEq in 100 mL IVPB  Every 1 Hour         09/08/23 1719    09/08/23 1745  Insulin Lispro (humaLOG) injection 2-7 Units  4 Times Daily Before Meals & Nightly         09/08/23 1728    09/08/23 1729  Discontinue Glucommander After Transition Complete  Once        Comments: Discontinue Glucommander After Transition Complete  - Click \"Discontinue IV\"  - Click \"Confirm\"    09/08/23 1728    09/08/23 1728  glucagon (GLUCAGEN) injection 1 mg  Every 15 Minutes PRN         09/08/23 1728    09/08/23 1728  dextrose (GLUTOSE) oral gel 15 g  Every 15 Minutes PRN         09/08/23 1728    09/08/23 1728  dextrose (D50W) (25 g/50 mL) IV injection 25 g  Every 15 Minutes PRN         09/08/23 1728    09/08/23 1657  Obtain Informed Consent  Once         09/08/23 1702    09/08/23 1630  Code Status and Medical Interventions:  Continuous         09/08/23 1630    09/08/23 1549  Hospitalist (on-call MD unless specified)  Once        Specialty:  Hospitalist  Provider:  Paco Davis MD    " 09/08/23 1548    09/08/23 1545  Gastroenterology (on-call MD unless specified)  Once        Specialty:  Gastroenterology  Provider:  Gary Stein MD    09/08/23 1545    Unscheduled  Follow Hypoglycemia Standing Orders For Blood Glucose <70 & Notify Provider of Treatment  As Needed      Comments: Follow Hypoglycemia Orders As Outlined in Process Instructions (Open Order Report to View Full Instructions)  Notify Provider Any Time Hypoglycemia Treatment is Administered    09/08/23 1728    Signed and Held  Vital Signs  Every 4 Hours       Signed and Held    Signed and Held  Intake & Output  Every Shift       Signed and Held    Signed and Held  Weigh Patient  Once         Signed and Held    Signed and Held  Oral Care  2 Times Daily       Signed and Held    Signed and Held  Insert Peripheral IV  Once         Signed and Held    Signed and Held  Saline Lock & Maintain IV Access  Continuous         Signed and Held    Signed and Held  sodium chloride 0.9 % flush 10 mL  Every 12 Hours Scheduled         Signed and Held    Signed and Held  sodium chloride 0.9 % flush 10 mL  As Needed         Signed and Held    Signed and Held  sodium chloride 0.9 % infusion 40 mL  As Needed         Signed and Held    Signed and Held  Enoxaparin Sodium (LOVENOX) syringe 40 mg  Daily         Signed and Held    Signed and Held  Daily Weights  Daily       Signed and Held    Signed and Held  Strict Intake & Output  Every Hour       Signed and Held    Signed and Held  Diet: Liquid Diets; Clear Liquid; Texture: Regular Texture (IDDSI 7); Fluid Consistency: Thin (IDDSI 0)  Diet Effective Now         Signed and Held    Signed and Held  NPO Diet NPO Type: Strict NPO  Diet Effective Midnight         Signed and Held    Signed and Held  Inpatient Gastroenterology Consult  Once        Specialty:  Gastroenterology  Provider:  Gary Stein MD    Signed and Held    Signed and Held  CBC Auto Differential  Morning Draw         Signed and Held     Signed and Held  Comprehensive Metabolic Panel  Morning Draw         Signed and Held    Signed and Held  Magnesium  Morning Draw         Signed and Held    Signed and Held  acetaminophen (TYLENOL) tablet 650 mg  Every 4 Hours PRN         Signed and Held    Signed and Held  Bowel Regimen Not Indicated  Once         Signed and Held    Signed and Held  ondansetron (ZOFRAN) injection 4 mg  Every 6 Hours PRN         Signed and Held    Signed and Held  lactated ringers infusion  Continuous         Signed and Held

## 2023-09-08 NOTE — H&P
[facility]  Date: 2023   Patient Name: Shashank Saunders  : 1965  MRN: [id number]  Primary Care Physician:  Shyanne Grijalva APRN  Date of admission: 2023    Subjective   Subjective     Chief Complaint: Inability to eat    HPI:    Shashank Saunders is a 58 y.o. male past medical history of coronary disease status post MI in , hypertension, type 2 diabetes, systolic heart failure but not on Lasix, and dyslipidemia who presents due to inability to tolerate solid    The patient states that he is lost 40+ pounds over the last 2 to 3 months.  Initially started out where he could not really eat solids and is progressed to where now it is difficult for him to get liquids.  No fevers.  No chills.  No leg swelling.  No shortness of breath.  Has not been able to eat or drink very much.  He comes ER for further evaluation.    In the emergency department the patient's vital signs were Sulster/1.4, pulse 93, respiratory is 18, blood pressure 131/98, 99% on room air.  CBC shows a white count of 12,000.  CMP shows a potassium of 3.2, creatinine to 1.61, glucose of 116.  Chest x-ray shows no concerning findings.  Gastroenterology was consulted the patient was in the hospital for evaluation of inability to tolerate solids or liquids.    All systems reviewed abnormalities noted above    Personal History     Past Medical History:  Dyslipidemia  Systolic heart failure with unknown EF  GERD  Type 2 diabetes  Hypertension  Artery disease with history of MI  Bipolar 1  Asthma/COPD    Past Surgical History:  Catheterization    Family History:   No known family history of esophageal cancer    Social History:   Smokes every day.  No alcohol use.  Previous history of polysubstance abuse      Home Medications:  QUEtiapine, albuterol, apixaban, atorvastatin, carvedilol, furosemide, lisinopril, metFORMIN, ondansetron ODT, and pantoprazole    Allergies:  No Known Allergies        Objective    Objective     Vitals:   Temp:  [97.4 °F (36.3 °C)] 97.4 °F (36.3 °C)  Heart Rate:  [83-94] 93  Resp:  [18] 18  BP: (151)/(98) 151/98    Physical Exam    Constitutional: Awake, alert, no acute distress   Eyes: Pupils equal, sclerae anicteric, no conjunctival injection   HENT: NCAT, mucous membranes moist   Neck: Supple, no thyromegaly, no lymphadenopathy, trachea midline   Respiratory: Clear to auscultation bilaterally, nonlabored respirations    Cardiovascular: RRR, no murmurs, rubs, or gallops, palpable pedal pulses bilaterally   Gastrointestinal: Positive bowel sounds, soft, nontender, nondistended   Musculoskeletal: No bilateral ankle edema, no clubbing or cyanosis to extremities   Psychiatric: Appropriate affect, cooperative   Neurologic: Oriented x 3, strength symmetric in all extremities, Cranial Nerves grossly intact to confrontation, speech clear   Skin: No rashes     Result Review    Result Review:  I have personally reviewed the results from the time of this admission to 9/8/2023 15:59 EDT and agree with these findings:  Creatinine is 1.6  Potassium is 3.2    Assessment & Plan   Assessment / Plan     Assessment/Plan:   Inability to tolerate solids  Decreasing ability to tolerate liquids  Suspected achalasia versus pseudo achalasia from malignancy  Significant weight loss  Systolic heart failure not on Lasix  Hypokalemia  Acute kidney injury with baseline creatinine 1.3 currently 1.6  Elevated BNP but does not appear fluid overload    --Admit to hospital service  --Consult gastroenterology  --Clear liquid diet  -- N.p.o. at midnight  -- Hold anticoagulation  -- CT scan of the chest to rule out malignant lesion  -- Lactated Ringer's at 50 mils an hour for 15 hours even though he has a BNP of 8000 I think this is most likely due to his kidney function as he has no signs or symptoms of fluid overload and he actually has a small bump in his creatinine.  This coincides with history of not being able to eat or  drink.  --Sliding scale for type 2 diabetes      DVT prophylaxis:  Hold anticoagulation  CODE STATUS:     Full code    Admission Status:  I believe this patient meets observation status.    Electronically signed by Paco Davis MD, 09/08/23, 3:59 PM EDT.

## 2023-09-09 ENCOUNTER — ANESTHESIA EVENT (OUTPATIENT)
Dept: GASTROENTEROLOGY | Facility: HOSPITAL | Age: 58
End: 2023-09-09
Payer: MEDICARE

## 2023-09-09 ENCOUNTER — ANESTHESIA (OUTPATIENT)
Dept: GASTROENTEROLOGY | Facility: HOSPITAL | Age: 58
End: 2023-09-09
Payer: MEDICARE

## 2023-09-09 ENCOUNTER — READMISSION MANAGEMENT (OUTPATIENT)
Dept: CALL CENTER | Facility: HOSPITAL | Age: 58
End: 2023-09-09
Payer: MEDICARE

## 2023-09-09 VITALS
TEMPERATURE: 97.8 F | WEIGHT: 189.15 LBS | HEIGHT: 70 IN | SYSTOLIC BLOOD PRESSURE: 137 MMHG | BODY MASS INDEX: 27.08 KG/M2 | OXYGEN SATURATION: 99 % | DIASTOLIC BLOOD PRESSURE: 89 MMHG | RESPIRATION RATE: 16 BRPM | HEART RATE: 78 BPM

## 2023-09-09 LAB
ALBUMIN SERPL-MCNC: 3.6 G/DL (ref 3.5–5.2)
ALBUMIN/GLOB SERPL: 1.5 G/DL
ALP SERPL-CCNC: 59 U/L (ref 39–117)
ALT SERPL W P-5'-P-CCNC: 20 U/L (ref 1–41)
ANION GAP SERPL CALCULATED.3IONS-SCNC: 9.6 MMOL/L (ref 5–15)
AST SERPL-CCNC: 19 U/L (ref 1–40)
BASOPHILS # BLD AUTO: 0.07 10*3/MM3 (ref 0–0.2)
BASOPHILS NFR BLD AUTO: 0.8 % (ref 0–1.5)
BILIRUB SERPL-MCNC: 0.7 MG/DL (ref 0–1.2)
BUN SERPL-MCNC: 27 MG/DL (ref 6–20)
BUN/CREAT SERPL: 25 (ref 7–25)
CALCIUM SPEC-SCNC: 8.9 MG/DL (ref 8.6–10.5)
CHLORIDE SERPL-SCNC: 102 MMOL/L (ref 98–107)
CO2 SERPL-SCNC: 24.4 MMOL/L (ref 22–29)
CREAT SERPL-MCNC: 1.08 MG/DL (ref 0.76–1.27)
DEPRECATED RDW RBC AUTO: 47.3 FL (ref 37–54)
EGFRCR SERPLBLD CKD-EPI 2021: 79.1 ML/MIN/1.73
EOSINOPHIL # BLD AUTO: 0.36 10*3/MM3 (ref 0–0.4)
EOSINOPHIL NFR BLD AUTO: 4 % (ref 0.3–6.2)
ERYTHROCYTE [DISTWIDTH] IN BLOOD BY AUTOMATED COUNT: 14.2 % (ref 12.3–15.4)
GLOBULIN UR ELPH-MCNC: 2.4 GM/DL
GLUCOSE BLDC GLUCOMTR-MCNC: 141 MG/DL (ref 70–99)
GLUCOSE BLDC GLUCOMTR-MCNC: 77 MG/DL (ref 70–99)
GLUCOSE BLDC GLUCOMTR-MCNC: 79 MG/DL (ref 70–99)
GLUCOSE SERPL-MCNC: 83 MG/DL (ref 65–99)
HCT VFR BLD AUTO: 43.7 % (ref 37.5–51)
HGB BLD-MCNC: 14.3 G/DL (ref 13–17.7)
IMM GRANULOCYTES # BLD AUTO: 0.01 10*3/MM3 (ref 0–0.05)
IMM GRANULOCYTES NFR BLD AUTO: 0.1 % (ref 0–0.5)
LYMPHOCYTES # BLD AUTO: 4.31 10*3/MM3 (ref 0.7–3.1)
LYMPHOCYTES NFR BLD AUTO: 47.6 % (ref 19.6–45.3)
MAGNESIUM SERPL-MCNC: 1.8 MG/DL (ref 1.6–2.6)
MCH RBC QN AUTO: 29.7 PG (ref 26.6–33)
MCHC RBC AUTO-ENTMCNC: 32.7 G/DL (ref 31.5–35.7)
MCV RBC AUTO: 90.7 FL (ref 79–97)
MONOCYTES # BLD AUTO: 0.88 10*3/MM3 (ref 0.1–0.9)
MONOCYTES NFR BLD AUTO: 9.7 % (ref 5–12)
NEUTROPHILS NFR BLD AUTO: 3.42 10*3/MM3 (ref 1.7–7)
NEUTROPHILS NFR BLD AUTO: 37.8 % (ref 42.7–76)
NRBC BLD AUTO-RTO: 0 /100 WBC (ref 0–0.2)
PLATELET # BLD AUTO: 265 10*3/MM3 (ref 140–450)
PMV BLD AUTO: 11.5 FL (ref 6–12)
POTASSIUM SERPL-SCNC: 3.5 MMOL/L (ref 3.5–5.2)
PROT SERPL-MCNC: 6 G/DL (ref 6–8.5)
RBC # BLD AUTO: 4.82 10*6/MM3 (ref 4.14–5.8)
SODIUM SERPL-SCNC: 136 MMOL/L (ref 136–145)
WBC NRBC COR # BLD: 9.05 10*3/MM3 (ref 3.4–10.8)

## 2023-09-09 PROCEDURE — 88341 IMHCHEM/IMCYTCHM EA ADD ANTB: CPT | Performed by: INTERNAL MEDICINE

## 2023-09-09 PROCEDURE — 88342 IMHCHEM/IMCYTCHM 1ST ANTB: CPT | Performed by: INTERNAL MEDICINE

## 2023-09-09 PROCEDURE — 63710000001 PANTOPRAZOLE 40 MG TABLET DELAYED-RELEASE: Performed by: INTERNAL MEDICINE

## 2023-09-09 PROCEDURE — A9270 NON-COVERED ITEM OR SERVICE: HCPCS | Performed by: INTERNAL MEDICINE

## 2023-09-09 PROCEDURE — 85025 COMPLETE CBC W/AUTO DIFF WBC: CPT | Performed by: INTERNAL MEDICINE

## 2023-09-09 PROCEDURE — 82948 REAGENT STRIP/BLOOD GLUCOSE: CPT

## 2023-09-09 PROCEDURE — 63710000001 SUCRALFATE 1 G TABLET: Performed by: INTERNAL MEDICINE

## 2023-09-09 PROCEDURE — 43249 ESOPH EGD DILATION <30 MM: CPT | Performed by: INTERNAL MEDICINE

## 2023-09-09 PROCEDURE — 83735 ASSAY OF MAGNESIUM: CPT | Performed by: INTERNAL MEDICINE

## 2023-09-09 PROCEDURE — 43239 EGD BIOPSY SINGLE/MULTIPLE: CPT | Performed by: INTERNAL MEDICINE

## 2023-09-09 PROCEDURE — G0378 HOSPITAL OBSERVATION PER HR: HCPCS

## 2023-09-09 PROCEDURE — 96361 HYDRATE IV INFUSION ADD-ON: CPT

## 2023-09-09 PROCEDURE — 96375 TX/PRO/DX INJ NEW DRUG ADDON: CPT

## 2023-09-09 PROCEDURE — 25010000002 PROPOFOL 10 MG/ML EMULSION: Performed by: NURSE ANESTHETIST, CERTIFIED REGISTERED

## 2023-09-09 PROCEDURE — 80053 COMPREHEN METABOLIC PANEL: CPT | Performed by: INTERNAL MEDICINE

## 2023-09-09 PROCEDURE — 88305 TISSUE EXAM BY PATHOLOGIST: CPT | Performed by: INTERNAL MEDICINE

## 2023-09-09 PROCEDURE — 99222 1ST HOSP IP/OBS MODERATE 55: CPT | Performed by: INTERNAL MEDICINE

## 2023-09-09 PROCEDURE — C1726 CATH, BAL DIL, NON-VASCULAR: HCPCS | Performed by: INTERNAL MEDICINE

## 2023-09-09 RX ORDER — LIDOCAINE HYDROCHLORIDE 20 MG/ML
INJECTION, SOLUTION EPIDURAL; INFILTRATION; INTRACAUDAL; PERINEURAL AS NEEDED
Status: DISCONTINUED | OUTPATIENT
Start: 2023-09-09 | End: 2023-09-09 | Stop reason: SURG

## 2023-09-09 RX ORDER — PANTOPRAZOLE SODIUM 40 MG/1
40 TABLET, DELAYED RELEASE ORAL
Status: DISCONTINUED | OUTPATIENT
Start: 2023-09-09 | End: 2023-09-09 | Stop reason: HOSPADM

## 2023-09-09 RX ORDER — PROPOFOL 10 MG/ML
VIAL (ML) INTRAVENOUS AS NEEDED
Status: DISCONTINUED | OUTPATIENT
Start: 2023-09-09 | End: 2023-09-09 | Stop reason: SURG

## 2023-09-09 RX ORDER — SUCRALFATE 1 G/1
1 TABLET ORAL
Qty: 90 TABLET | Refills: 1 | Status: SHIPPED | OUTPATIENT
Start: 2023-09-10

## 2023-09-09 RX ORDER — SUCRALFATE 1 G/1
1 TABLET ORAL
Status: DISCONTINUED | OUTPATIENT
Start: 2023-09-09 | End: 2023-09-09 | Stop reason: HOSPADM

## 2023-09-09 RX ADMIN — PROPOFOL 100 MG: 10 INJECTION, EMULSION INTRAVENOUS at 08:46

## 2023-09-09 RX ADMIN — SUCRALFATE 1 G: 1 TABLET ORAL at 12:17

## 2023-09-09 RX ADMIN — Medication 10 ML: at 09:00

## 2023-09-09 RX ADMIN — SUCRALFATE 1 G: 1 TABLET ORAL at 16:35

## 2023-09-09 RX ADMIN — PROPOFOL 50 MG: 10 INJECTION, EMULSION INTRAVENOUS at 08:55

## 2023-09-09 RX ADMIN — LIDOCAINE HYDROCHLORIDE 100 MG: 20 INJECTION, SOLUTION EPIDURAL; INFILTRATION; INTRACAUDAL; PERINEURAL at 08:46

## 2023-09-09 RX ADMIN — PROPOFOL 50 MG: 10 INJECTION, EMULSION INTRAVENOUS at 09:02

## 2023-09-09 RX ADMIN — PROPOFOL 50 MG: 10 INJECTION, EMULSION INTRAVENOUS at 08:51

## 2023-09-09 RX ADMIN — PANTOPRAZOLE SODIUM 40 MG: 40 INJECTION, POWDER, FOR SOLUTION INTRAVENOUS at 05:59

## 2023-09-09 RX ADMIN — SODIUM CHLORIDE, POTASSIUM CHLORIDE, SODIUM LACTATE AND CALCIUM CHLORIDE: 600; 310; 30; 20 INJECTION, SOLUTION INTRAVENOUS at 09:09

## 2023-09-09 RX ADMIN — PANTOPRAZOLE SODIUM 40 MG: 40 TABLET, DELAYED RELEASE ORAL at 18:26

## 2023-09-09 RX ADMIN — PROPOFOL 50 MG: 10 INJECTION, EMULSION INTRAVENOUS at 09:00

## 2023-09-09 NOTE — PLAN OF CARE
Goal Outcome Evaluation:  Plan of Care Reviewed With: patient   Patient is alert and orientated x4. Vitals are stable and denies any pain or discomfort this shift. Patient has procedure done this shift. Patient tolerated new diet okay, patient did state that he is regurgitating some of the sandwich he had for lunch, but other than that he stated that he is tolerating it well. Patient is resting well in bed at this time with call light in reach.     Progress: improving

## 2023-09-09 NOTE — DISCHARGE SUMMARY
[facility]         HOSPITALIST  DISCHARGE SUMMARY    Patient Name: Shashank Saunders  : 1965  MRN: [id number]    Date of Admission: 2023  Date of Discharge: 2023  Primary Care Physician: Shyanne Grijalva APRN    Consults       Date and Time Order Name Status Description    2023 10:28 PM Inpatient Gastroenterology Consult Completed     2023  3:49 PM Hospitalist (on-call MD unless specified)      2023  3:45 PM Gastroenterology (on-call MD unless specified)              Active and Resolved Hospital Problems:  Active Hospital Problems    Diagnosis POA    **Intractable nausea and vomiting [R11.2] Yes    Esophageal stricture [K22.2] Unknown      Resolved Hospital Problems   No resolved problems to display.   Intractable nausea and vomiting due to esophageal stricture  Part solid opacity in the right upper lobe short-term follow-up with CT scan of the chest is recommended in 3 months  Coronary artery disease  Systolic heart failure  Acute kidney injury on chronic kidney disease resolved        Hospital Course     Hospital Course:  Shashank Saunders is a 58 y.o. male past medical history of coronary disease status post MI in , hypertension, type 2 diabetes, systolic heart failure but not on Lasix, and dyslipidemia who presents due to inability to tolerate solid     The patient states that he is lost 40+ pounds over the last 2 to 3 months.  Initially started out where he could not really eat solids and is progressed to where now it is difficult for him to get liquids.  No fevers.  No chills.  No leg swelling.  No shortness of breath.  Has not been able to eat or drink very much.  He comes ER for further evaluation.     In the emergency department the patient's vital signs were Sulster/1.4, pulse 93, respiratory is 18, blood pressure 131/98, 99% on room air.  CBC shows a white count of 12,000.  CMP shows a potassium of 3.2, creatinine to 1.61, glucose of 116.  Chest x-ray shows no  concerning findings.  Gastroenterology was consulted the patient was in the hospital for evaluation of inability to tolerate solids or liquids.    EGD showed stricture no obvious mass.  There was a biopsy taken.  Gastroenterology will follow up with the patient.        DISCHARGE Follow Up Recommendations for labs and diagnostics:   -- Esophageal stricture concerning for achalasia versus achalasia dilation  -- Repeat EGD per gastroenterology next 7 to 10 days  -- Needs to follow-up with primary care doctor due to right upper lobe nodule seen on CT scan that needs to be repeated in 3 months  -- Educated patient on reasons he will need to return the emergency department  -- Continue Protonix twice daily and Carafate was added      Day of Discharge     Vital Signs:  Temp:  [96 °F (35.6 °C)-98.2 °F (36.8 °C)] 97.8 °F (36.6 °C)  Heart Rate:  [] 78  Resp:  [16-19] 16  BP: ()/(51-91) 137/89  Flow (L/min):  [3-4] 3  Physical Exam:               Constitutional: Awake, alert, no acute distress              Eyes: Pupils equal, sclerae anicteric, no conjunctival injection              HENT: NCAT, mucous membranes moist              Neck: Supple, no thyromegaly, no lymphadenopathy, trachea midline              Respiratory: Clear to auscultation bilaterally, nonlabored respirations               Cardiovascular: RRR, no murmurs, rubs, or gallops, palpable pedal pulses bilaterally              Gastrointestinal: Positive bowel sounds, soft, nontender, nondistended              Musculoskeletal: No bilateral ankle edema, no clubbing or cyanosis to extremities              Psychiatric: Appropriate affect, cooperative              Neurologic: Oriented x 3, strength symmetric in all extremities, Cranial Nerves grossly intact to confrontation, speech clear              Skin: No rash      Discharge Details        Discharge Medications        New Medications        Instructions Start Date   sucralfate 1 g tablet  Commonly known  as: CARAFATE   1 g, Oral, 3 Times Daily Before Meals   Start Date: September 10, 2023            Continue These Medications        Instructions Start Date   albuterol (2.5 MG/3ML) 0.083% nebulizer solution  Commonly known as: PROVENTIL   2.5 mg, Inhalation      atorvastatin 20 MG tablet  Commonly known as: LIPITOR   20 mg, Oral, Nightly      carvedilol 12.5 MG tablet  Commonly known as: COREG   1 tablet, Oral, Every 12 Hours Scheduled      Eliquis 5 MG tablet tablet  Generic drug: apixaban   1 tablet, Oral, Every 12 Hours Scheduled      lisinopril 10 MG tablet  Commonly known as: PRINIVIL,ZESTRIL   1 tablet, Oral, Daily      metFORMIN 500 MG tablet  Commonly known as: GLUCOPHAGE   500 mg, Oral, 2 Times Daily With Meals      ondansetron ODT 4 MG disintegrating tablet  Commonly known as: ZOFRAN-ODT   Place 1 tablet on the tongue Every 8 (Eight) Hours As Needed.      pantoprazole 40 MG EC tablet  Commonly known as: PROTONIX   40 mg, Oral, 2 Times Daily Before Meals      QUEtiapine 200 MG tablet  Commonly known as: SEROquel   Take 1 tablet by mouth Every Night.               No Known Allergies    Discharge Disposition:  Home or Self Care    Diet:  Hospital:  Diet Order   Procedures    Diet: Regular/House Diet; Texture: Soft to Chew (NDD 3); Soft to Chew: Chopped Meat; Fluid Consistency: Thin (IDDSI 0)       Discharge Activity:   As tolerated    Status:  Improved    CODE STATUS:  Code Status and Medical Interventions:   Ordered at: 09/08/23 1631     Level Of Support Discussed With:    Patient     Code Status (Patient has no pulse and is not breathing):    CPR (Attempt to Resuscitate)     Medical Interventions (Patient has pulse or is breathing):    Full Support         No future appointments.    Additional Instructions for the Follow-ups that You Need to Schedule       Discharge Follow-up with PCP   As directed       Currently Documented PCP:    Shyanne Grijalva APRN    PCP Phone Number:    [phone]     Follow Up  Details: 7-10 days to follow-up on pulmoanyr nodule        Discharge Follow-up with Specified Provider: Sarita; 1 Week   As directed      To: Sarita   Follow Up: 1 Week   Follow Up Details: he will need a repeat EGD in 7-10 days                Pertinent  and/or Most Recent Results     PROCEDURES:     LAB RESULTS:      Lab 09/09/23  0515 09/08/23  1301   WBC 9.05 12.24*   HEMOGLOBIN 14.3 16.0   HEMATOCRIT 43.7 46.3   PLATELETS 265 322   NEUTROS ABS 3.42 6.73   IMMATURE GRANS (ABS) 0.01 0.02   LYMPHS ABS 4.31* 3.83*   MONOS ABS 0.88 1.42*   EOS ABS 0.36 0.17   MCV 90.7 86.5   PROCALCITONIN  --  0.08   PROTIME  --  13.9   APTT  --  29.5*         Lab 09/09/23  0515 09/08/23  1301   SODIUM 136 136   POTASSIUM 3.5 3.2*   CHLORIDE 102 95*   CO2 24.4 27.2   ANION GAP 9.6 13.8   BUN 27* 36*   CREATININE 1.08 1.61*   EGFR 79.1 49.0*   GLUCOSE 83 116*   CALCIUM 8.9 9.9   MAGNESIUM 1.8  --          Lab 09/09/23  0515 09/08/23  1301   TOTAL PROTEIN 6.0 7.3   ALBUMIN 3.6 4.4   GLOBULIN 2.4 2.9   ALT (SGPT) 20 25   AST (SGOT) 19 25   BILIRUBIN 0.7 0.7   ALK PHOS 59 69         Lab 09/08/23  1301   PROBNP 8,202.0*   PROTIME 13.9   INR 1.06                 Brief Urine Lab Results       None          Microbiology Results (last 10 days)       ** No results found for the last 240 hours. **            CT Chest With Contrast Diagnostic    Result Date: 9/8/2023    CT scan of the chest with IV contrast demonstrating moderately dilated fluid-filled esophagus.  The differential diagnosis includes achalasia, stricture, and mass lesion.  Emphysema.  Part solid opacity in the right upper lobe.  Short-term follow-up CT scan of the chest is recommended in 3 months.     MISBAH BARNARD MD       Electronically Signed and Approved By: MISBAH BARNARD MD on 9/08/2023 at 16:57             XR Chest 1 View    Result Date: 9/8/2023   Under inflated lungs with bibasilar atelectasis.  No acute cardiopulmonary process.       SAE DREW,         Electronically Signed and Approved By: SAE DREW DO on 9/08/2023 at 13:26                           Labs Pending at Discharge:  Pending Labs       Order Current Status    Tissue Pathology Exam Collected (09/09/23 0855)              Time spent on Discharge including face to face service:  >30 minutes    Electronically signed by Paco Davis MD, 09/09/23, 6:04 PM EDT.

## 2023-09-09 NOTE — ANESTHESIA PREPROCEDURE EVALUATION
Anesthesia Evaluation     Patient summary reviewed and Nursing notes reviewed                Airway   Mallampati: I  TM distance: >3 FB  Neck ROM: full  No difficulty expected  Dental      Pulmonary - normal exam    breath sounds clear to auscultation  (+) asthma,  Cardiovascular - normal exam    Rhythm: regular  Rate: normal    (+) hypertension, past MI , CAD, CHF       Neuro/Psych- negative ROS  GI/Hepatic/Renal/Endo    (+) GERD    Musculoskeletal (-) negative ROS    Abdominal    Substance History - negative use     OB/GYN negative ob/gyn ROS         Other                      Anesthesia Plan    ASA 4     general     intravenous induction     Anesthetic plan, risks, benefits, and alternatives have been provided, discussed and informed consent has been obtained with: patient.    CODE STATUS:    Level Of Support Discussed With: Patient  Code Status (Patient has no pulse and is not breathing): CPR (Attempt to Resuscitate)  Medical Interventions (Patient has pulse or is breathing): Full Support

## 2023-09-09 NOTE — ANESTHESIA POSTPROCEDURE EVALUATION
Patient: Shashank Saunders    Procedure Summary       Date: 09/09/23 Room / Location: [facility] ENDOSCOPY 3 / [facility] ENDOSCOPY    Anesthesia Start: 0843 Anesthesia Stop: 0917    Procedure: ESOPHAGOGASTRODUODENOSCOPY WITH ESOPHAGEAL BALLOON DILATATION/BIOPSIES Diagnosis:       Esophageal stricture      (Esophageal stricture [K22.2])    Surgeons: Gary Stein MD Provider: Matthew Champagne CRNA    Anesthesia Type: general ASA Status: 4            Anesthesia Type: general    Vitals  Vitals Value Taken Time   /72 09/09/23 0925   Temp 35.8 °C (96.4 °F) 09/09/23 0925   Pulse 66 09/09/23 0926   Resp 16 09/09/23 0925   SpO2 100 % 09/09/23 0926   Vitals shown include unvalidated device data.        Post Anesthesia Care and Evaluation    Patient location during evaluation: bedside  Patient participation: complete - patient participated  Level of consciousness: awake  Pain score: 3  Pain management: adequate    Airway patency: patent  Anesthetic complications: No anesthetic complications  PONV Status: none  Cardiovascular status: acceptable  Respiratory status: acceptable  Hydration status: acceptable

## 2023-09-09 NOTE — H&P (VIEW-ONLY)
[facility]  Initial Inpatient Consult Note    Referring Provider: Hospitalist    Reason for Consultation: Dysphagia    Subjective     History of present illness:    58 y.o. male with a history of coronary disease on Eliquis, remote stent, congestive heart failure, and asthma who presented to the emergency department with progressive dysphagia.  Over the past several months patient has been unable to swallow solid foods and has been using mostly liquids for nutritional support.  Over the last couple of days he has been unable to take even pills or any liquids without them regurgitating back upward.  He is lost 56 pounds.  He denies chest pain or abdominal pain.  He has not taken his Eliquis in a few days as he was unable to keep it down.  He has never had prior EGD.    Past Medical History:  Past Medical History:   Diagnosis Date    Asthma     Congestive heart failure (CHF)     Coronary artery disease     Heart attack     Hypertension      Past Surgical History:  Past Surgical History:   Procedure Laterality Date    CARDIAC CATHETERIZATION      CARDIAC SURGERY      Stent x1      Social History:   Social History     Tobacco Use    Smoking status: Every Day     Packs/day: 0.50     Types: Cigarettes    Smokeless tobacco: Never   Substance Use Topics    Alcohol use: Not Currently      Family History:  History reviewed. No pertinent family history.    Home Meds:  Medications Prior to Admission   Medication Sig Dispense Refill Last Dose    atorvastatin (LIPITOR) 20 MG tablet Take 1 tablet by mouth Every Night.   Past Month    carvedilol (COREG) 12.5 MG tablet Take 1 tablet by mouth Every 12 (Twelve) Hours.   Past Month    Eliquis 5 MG tablet tablet Take 1 tablet by mouth Every 12 (Twelve) Hours.   Past Month    lisinopril (PRINIVIL,ZESTRIL) 10 MG tablet Take 1 tablet by mouth Daily.   Past Month    metFORMIN (GLUCOPHAGE) 500 MG tablet Take 1 tablet by mouth 2 (Two) Times a Day With Meals.   Past  Month    ondansetron ODT (ZOFRAN-ODT) 4 MG disintegrating tablet Place 1 tablet on the tongue Every 8 (Eight) Hours As Needed.   Past Month    pantoprazole (PROTONIX) 40 MG EC tablet Take 1 tablet by mouth 2 (Two) Times a Day Before Meals. 60 tablet 2 Past Month    QUEtiapine (SEROquel) 200 MG tablet Take 1 tablet by mouth Every Night.   Past Month    albuterol (PROVENTIL) (2.5 MG/3ML) 0.083% nebulizer solution Inhale 2.5 mg.   More than a month     Current Meds:   !NOT ORDERED- apixaban (ELIQUIS), , Does not apply, Daily With Dinner  enoxaparin, 40 mg, Subcutaneous, Nightly  insulin lispro, 2-7 Units, Subcutaneous, 4x Daily AC & at Bedtime  nicotine, 1 patch, Transdermal, Q24H  pantoprazole, 40 mg, Intravenous, Q AM  sodium chloride, 10 mL, Intravenous, Q12H      Allergies:  No Known Allergies  Review of Systems  Pertinent items are noted in HPI, all other systems reviewed and negative         Vital Signs  Temp:  [97.4 °F (36.3 °C)-98.2 °F (36.8 °C)] 98.2 °F (36.8 °C)  Heart Rate:  [71-94] 72  Resp:  [18] 18  BP: (119-151)/(65-98) 119/80  Physical Exam:  General Appearance:    Alert, cooperative, in no acute distress   Head:    Normocephalic, without obvious abnormality, atraumatic   Eyes:          conjunctivae and sclerae normal, no   icterus   Throat:   no thrush, oral mucosa moist   Neck:   Supple, no adenopathy   Lungs:     Clear to auscultation bilaterally    Heart:    Regular rhythm and normal rate    Chest Wall:    No abnormalities observed   Abdomen:     Soft, nondistended, nontender; normal bowel sounds   Extremities:   no edema, no redness   Skin:   No bruising or rash   Psychiatric:  normal mood and insight     Results Review:  [x]  Laboratory   [x]  Radiology  []  Pathology      I reviewed the patient's new clinical results.    Results from last 7 days   Lab Units 09/09/23  0515 09/08/23  1301   WBC 10*3/mm3 9.05 12.24*   HEMOGLOBIN g/dL 14.3 16.0   HEMATOCRIT % 43.7 46.3   PLATELETS 10*3/mm3 265 322      Results from last 7 days   Lab Units 09/09/23  0515 09/08/23  1301   SODIUM mmol/L 136 136   POTASSIUM mmol/L 3.5 3.2*   CHLORIDE mmol/L 102 95*   CO2 mmol/L 24.4 27.2   BUN mg/dL 27* 36*   CREATININE mg/dL 1.08 1.61*   CALCIUM mg/dL 8.9 9.9   BILIRUBIN mg/dL 0.7 0.7   ALK PHOS U/L 59 69   ALT (SGPT) U/L 20 25   AST (SGOT) U/L 19 25   GLUCOSE mg/dL 83 116*     Results from last 7 days   Lab Units 09/08/23  1301   INR  1.06     No results found for: LIPASE    Radiology:  CT Chest With Contrast Diagnostic   Final Result       CT scan of the chest with IV contrast demonstrating moderately dilated fluid-filled esophagus.  The    differential diagnosis includes achalasia, stricture, and mass lesion.       Emphysema.       Part solid opacity in the right upper lobe.  Short-term follow-up CT scan of the chest is    recommended in 3 months.                MISBAH BARNARD MD          Electronically Signed and Approved By: MISBAH BARNARD MD on 9/08/2023 at 16:57                           XR Chest 1 View   Final Result    Under inflated lungs with bibasilar atelectasis.  No acute cardiopulmonary process.                        SAE DREW DO          Electronically Signed and Approved By: SAE DREW DO on 9/08/2023 at 13:26                                Assessment & Plan       Intractable nausea and vomiting    Esophageal stricture       Plan:  Patient with progressive dysphagia and significant weight loss over the past several months.  CT imaging suggestive of possible esophageal stricture.  I will schedule the patient for an upper endoscopy.  I discussed risk and benefits with the patient and he is willing to proceed.      I discussed the patients findings and my recommendations with patient.    Gary Stein MD

## 2023-09-09 NOTE — CONSULTS
[facility]  Initial Inpatient Consult Note    Referring Provider: Hospitalist    Reason for Consultation: Dysphagia    Subjective     History of present illness:    58 y.o. male with a history of coronary disease on Eliquis, remote stent, congestive heart failure, and asthma who presented to the emergency department with progressive dysphagia.  Over the past several months patient has been unable to swallow solid foods and has been using mostly liquids for nutritional support.  Over the last couple of days he has been unable to take even pills or any liquids without them regurgitating back upward.  He is lost 56 pounds.  He denies chest pain or abdominal pain.  He has not taken his Eliquis in a few days as he was unable to keep it down.  He has never had prior EGD.    Past Medical History:  Past Medical History:   Diagnosis Date    Asthma     Congestive heart failure (CHF)     Coronary artery disease     Heart attack     Hypertension      Past Surgical History:  Past Surgical History:   Procedure Laterality Date    CARDIAC CATHETERIZATION      CARDIAC SURGERY      Stent x1      Social History:   Social History     Tobacco Use    Smoking status: Every Day     Packs/day: 0.50     Types: Cigarettes    Smokeless tobacco: Never   Substance Use Topics    Alcohol use: Not Currently      Family History:  History reviewed. No pertinent family history.    Home Meds:  Medications Prior to Admission   Medication Sig Dispense Refill Last Dose    atorvastatin (LIPITOR) 20 MG tablet Take 1 tablet by mouth Every Night.   Past Month    carvedilol (COREG) 12.5 MG tablet Take 1 tablet by mouth Every 12 (Twelve) Hours.   Past Month    Eliquis 5 MG tablet tablet Take 1 tablet by mouth Every 12 (Twelve) Hours.   Past Month    lisinopril (PRINIVIL,ZESTRIL) 10 MG tablet Take 1 tablet by mouth Daily.   Past Month    metFORMIN (GLUCOPHAGE) 500 MG tablet Take 1 tablet by mouth 2 (Two) Times a Day With Meals.   Past  Month    ondansetron ODT (ZOFRAN-ODT) 4 MG disintegrating tablet Place 1 tablet on the tongue Every 8 (Eight) Hours As Needed.   Past Month    pantoprazole (PROTONIX) 40 MG EC tablet Take 1 tablet by mouth 2 (Two) Times a Day Before Meals. 60 tablet 2 Past Month    QUEtiapine (SEROquel) 200 MG tablet Take 1 tablet by mouth Every Night.   Past Month    albuterol (PROVENTIL) (2.5 MG/3ML) 0.083% nebulizer solution Inhale 2.5 mg.   More than a month     Current Meds:   !NOT ORDERED- apixaban (ELIQUIS), , Does not apply, Daily With Dinner  enoxaparin, 40 mg, Subcutaneous, Nightly  insulin lispro, 2-7 Units, Subcutaneous, 4x Daily AC & at Bedtime  nicotine, 1 patch, Transdermal, Q24H  pantoprazole, 40 mg, Intravenous, Q AM  sodium chloride, 10 mL, Intravenous, Q12H      Allergies:  No Known Allergies  Review of Systems  Pertinent items are noted in HPI, all other systems reviewed and negative         Vital Signs  Temp:  [97.4 °F (36.3 °C)-98.2 °F (36.8 °C)] 98.2 °F (36.8 °C)  Heart Rate:  [71-94] 72  Resp:  [18] 18  BP: (119-151)/(65-98) 119/80  Physical Exam:  General Appearance:    Alert, cooperative, in no acute distress   Head:    Normocephalic, without obvious abnormality, atraumatic   Eyes:          conjunctivae and sclerae normal, no   icterus   Throat:   no thrush, oral mucosa moist   Neck:   Supple, no adenopathy   Lungs:     Clear to auscultation bilaterally    Heart:    Regular rhythm and normal rate    Chest Wall:    No abnormalities observed   Abdomen:     Soft, nondistended, nontender; normal bowel sounds   Extremities:   no edema, no redness   Skin:   No bruising or rash   Psychiatric:  normal mood and insight     Results Review:  [x]  Laboratory   [x]  Radiology  []  Pathology      I reviewed the patient's new clinical results.    Results from last 7 days   Lab Units 09/09/23  0515 09/08/23  1301   WBC 10*3/mm3 9.05 12.24*   HEMOGLOBIN g/dL 14.3 16.0   HEMATOCRIT % 43.7 46.3   PLATELETS 10*3/mm3 265 322      Results from last 7 days   Lab Units 09/09/23  0515 09/08/23  1301   SODIUM mmol/L 136 136   POTASSIUM mmol/L 3.5 3.2*   CHLORIDE mmol/L 102 95*   CO2 mmol/L 24.4 27.2   BUN mg/dL 27* 36*   CREATININE mg/dL 1.08 1.61*   CALCIUM mg/dL 8.9 9.9   BILIRUBIN mg/dL 0.7 0.7   ALK PHOS U/L 59 69   ALT (SGPT) U/L 20 25   AST (SGOT) U/L 19 25   GLUCOSE mg/dL 83 116*     Results from last 7 days   Lab Units 09/08/23  1301   INR  1.06     No results found for: LIPASE    Radiology:  CT Chest With Contrast Diagnostic   Final Result       CT scan of the chest with IV contrast demonstrating moderately dilated fluid-filled esophagus.  The    differential diagnosis includes achalasia, stricture, and mass lesion.       Emphysema.       Part solid opacity in the right upper lobe.  Short-term follow-up CT scan of the chest is    recommended in 3 months.                MISBAH BARNARD MD          Electronically Signed and Approved By: MISBAH BARNARD MD on 9/08/2023 at 16:57                           XR Chest 1 View   Final Result    Under inflated lungs with bibasilar atelectasis.  No acute cardiopulmonary process.                        SAE DREW DO          Electronically Signed and Approved By: SAE DREW DO on 9/08/2023 at 13:26                                Assessment & Plan       Intractable nausea and vomiting    Esophageal stricture       Plan:  Patient with progressive dysphagia and significant weight loss over the past several months.  CT imaging suggestive of possible esophageal stricture.  I will schedule the patient for an upper endoscopy.  I discussed risk and benefits with the patient and he is willing to proceed.      I discussed the patients findings and my recommendations with patient.    Gary Stein MD

## 2023-09-09 NOTE — PLAN OF CARE
Goal Outcome Evaluation: Patient new admission, admission completed.  Patient with no needs or concerns voiced.  Patient with no acute events thus far this shift.  Patient to go for EGD today.  Continue plan of care.

## 2023-09-09 NOTE — PLAN OF CARE
Goal Outcome Evaluation:  Plan of Care Reviewed With: patient   Patient approved for discharge by doctor. Patient is excited to be going home and states that he understands discharge teaching.     Progress: improving

## 2023-09-09 NOTE — CONSULTS
"Nutrition Services    Patient Name: Shashank Saunders  YOB: 1965  MRN: [id number]  Admission date: 9/8/2023      CLINICAL NUTRITION ASSESSMENT      Reason for Assessment  MST score 2+     H&P:    Past Medical History:   Diagnosis Date    Asthma     Congestive heart failure (CHF)     Coronary artery disease     Heart attack     Hypertension         Current Problems:   Active Hospital Problems    Diagnosis     **Intractable nausea and vomiting         Nutrition/Diet History         Narrative     Patient admitted with nausea/vomiting.  Has had significant weight loss over the past month.  Currently NPO for planned EGD.  Will follow and complete full nutrition assessment as diet is advanced post-procedure.       Anthropometrics        Current Height, Weight Height: 177.8 cm (70\")  Weight: 85.8 kg (189 lb 2.5 oz)   Current BMI Body mass index is 27.14 kg/m².       Weight Hx  Wt Readings from Last 30 Encounters:   09/08/23 1845 85.8 kg (189 lb 2.5 oz)   09/08/23 1206 84.6 kg (186 lb 8.2 oz)   08/02/23 0842 97.1 kg (214 lb)            Wt Change Observation -11.6% x 1 month     Estimated/Assessed Needs       Energy Requirements 30 kcal/kg    EST Needs (kcal/day) 2574 kcal        Protein Requirements 1.0 g/kg    EST Daily Needs (g/day) 86 g       Fluid Requirements 25-30 ml/kg    Estimated Needs (mL/day) 4915-0011 ml      Labs/Medications         Pertinent Labs Reviewed.   Results from last 7 days   Lab Units 09/09/23  0515 09/08/23  1301   SODIUM mmol/L 136 136   POTASSIUM mmol/L 3.5 3.2*   CHLORIDE mmol/L 102 95*   CO2 mmol/L 24.4 27.2   BUN mg/dL 27* 36*   CREATININE mg/dL 1.08 1.61*   CALCIUM mg/dL 8.9 9.9   BILIRUBIN mg/dL 0.7 0.7   ALK PHOS U/L 59 69   ALT (SGPT) U/L 20 25   AST (SGOT) U/L 19 25   GLUCOSE mg/dL 83 116*     Results from last 7 days   Lab Units 09/09/23  0515   MAGNESIUM mg/dL 1.8   HEMOGLOBIN g/dL 14.3   HEMATOCRIT % 43.7     No results found for: COVID19  Lab Results   Component Value Date    " HGBA1C 6.7 (H) 01/31/2023         Pertinent Medications Reviewed.     Current Nutrition Orders & Evaluation of Intake       Oral Nutrition     Current PO Diet NPO Diet NPO Type: Strict NPO   Supplement No active supplement orders       Malnutrition Severity Assessment                Nutrition Diagnosis         Nutrition Dx Problem 1 Unintentional weight loss related to decreased ability to consume sufficient energy as evidenced by unintended wt change.       Nutrition Intervention         Current NPO for EGD.    Will follow to complete further nutrition assessment as diet is advanced.       Medical Nutrition Therapy/Nutrition Education          Learner     Readiness N/A  N/A     Method     Response N/A  N/A     Monitor/Evaluation        Monitor Per protocol, POC/GOC, Diet advancement       Nutrition Discharge Plan         To be determined       Electronically signed by:  Bertha Baez RD  09/09/23 06:53 EDT

## 2023-09-10 NOTE — OUTREACH NOTE
Prep Survey      Flowsheet Row Responses   [facility] facility patient discharged from? [facility]   Is LACE score < 7 ? Yes   Eligibility Not Eligible   What are the reasons patient is not eligible? Other   Does the patient have one of the following disease processes/diagnoses(primary or secondary)? Other   Prep survey completed? Yes            TERRANCE BAEZ - Registered Nurse

## 2023-09-11 ENCOUNTER — PREP FOR SURGERY (OUTPATIENT)
Dept: OTHER | Facility: HOSPITAL | Age: 58
End: 2023-09-11
Payer: MEDICARE

## 2023-09-11 ENCOUNTER — TELEPHONE (OUTPATIENT)
Dept: GASTROENTEROLOGY | Facility: CLINIC | Age: 58
End: 2023-09-11
Payer: MEDICARE

## 2023-09-11 DIAGNOSIS — K22.2 ESOPHAGEAL STRICTURE: Primary | ICD-10-CM

## 2023-09-11 DIAGNOSIS — R13.10 DYSPHAGIA, UNSPECIFIED TYPE: ICD-10-CM

## 2023-09-11 NOTE — TELEPHONE ENCOUNTER
Case request placed for second sign.   Attempted to contact pt. Left a vm requesting a return call.

## 2023-09-11 NOTE — TELEPHONE ENCOUNTER
[facility] staff attempted to follow warm transfer process and was unsuccessful     Caller: VANESSA VANG    Relationship to patient: SPOUSE     Best call back number: [phone]    Patient is needing: PATIENT WAS SEEN IN THE ED ON 09/09/23 AND DR PATINO DID AN EGD. DISCHARGE SUMMARY SAYS A REPEAT EGD IS NEEDED IN 7-10 DAYS. [facility] ATTEMPTED TO XFER TO BOTH DR PATINO AND DR MOON'S OFFICE WHERE HE IS CURRENTLY ESTABLISHED. PLEASE CALL PATIENT'S WIFE BACK [phone] TO SCHEDULE EGD. OK TO LEAVE HER A VOICEMAIL.

## 2023-09-12 ENCOUNTER — TELEPHONE (OUTPATIENT)
Dept: GASTROENTEROLOGY | Facility: CLINIC | Age: 58
End: 2023-09-12
Payer: MEDICARE

## 2023-09-12 LAB
CYTO UR: NORMAL
LAB AP CASE REPORT: NORMAL
LAB AP CLINICAL INFORMATION: NORMAL
LAB AP SPECIAL STAINS: NORMAL
PATH REPORT.FINAL DX SPEC: NORMAL
PATH REPORT.GROSS SPEC: NORMAL

## 2023-09-12 NOTE — TELEPHONE ENCOUNTER
I called the patient and left messages on his wife's cell phone and home phone telephone numbers with instructions to call back to discuss biopsy results.

## 2023-09-13 ENCOUNTER — TELEPHONE (OUTPATIENT)
Dept: GASTROENTEROLOGY | Facility: CLINIC | Age: 58
End: 2023-09-13
Payer: MEDICARE

## 2023-09-13 DIAGNOSIS — C15.5 MALIGNANT NEOPLASM OF LOWER THIRD OF ESOPHAGUS: Primary | ICD-10-CM

## 2023-09-13 NOTE — TELEPHONE ENCOUNTER
I spoke to the patient and notified of bx results of esophageal mass showing squamous cell cancer.  Referral to Oncology place for urgent appt..  I answered all of his and his wife's questions.  CT chest noted.

## 2023-09-13 NOTE — TELEPHONE ENCOUNTER
Received call from Patient-Spoke with wife Alejandrina-she states Dr Stein called and left VM for a call back.   Sent request to Dr Stein to call patient back.

## 2023-09-14 PROBLEM — C15.5 MALIGNANT NEOPLASM OF LOWER THIRD OF ESOPHAGUS: Status: ACTIVE | Noted: 2023-09-14

## 2023-09-14 PROBLEM — C15.9 ESOPHAGEAL CANCER: Status: ACTIVE | Noted: 2023-09-14

## 2023-09-15 ENCOUNTER — CONSULT (OUTPATIENT)
Dept: ONCOLOGY | Facility: HOSPITAL | Age: 58
End: 2023-09-15
Payer: MEDICARE

## 2023-09-15 VITALS
WEIGHT: 205.91 LBS | DIASTOLIC BLOOD PRESSURE: 92 MMHG | SYSTOLIC BLOOD PRESSURE: 148 MMHG | TEMPERATURE: 97.5 F | RESPIRATION RATE: 18 BRPM | BODY MASS INDEX: 29.48 KG/M2 | HEART RATE: 70 BPM | OXYGEN SATURATION: 99 % | HEIGHT: 70 IN

## 2023-09-15 DIAGNOSIS — C15.5 MALIGNANT NEOPLASM OF LOWER THIRD OF ESOPHAGUS: Primary | ICD-10-CM

## 2023-09-15 DIAGNOSIS — G89.3 NEOPLASM RELATED PAIN: ICD-10-CM

## 2023-09-15 PROBLEM — I34.0 MODERATE MITRAL REGURGITATION: Status: ACTIVE | Noted: 2023-02-01

## 2023-09-15 PROBLEM — I42.8 NICM (NONISCHEMIC CARDIOMYOPATHY): Status: ACTIVE | Noted: 2023-02-01

## 2023-09-15 PROBLEM — I48.92 ATRIAL FLUTTER WITH RAPID VENTRICULAR RESPONSE: Status: ACTIVE | Noted: 2023-01-30

## 2023-09-15 PROBLEM — I25.10 CORONARY ARTERY DISEASE INVOLVING NATIVE CORONARY ARTERY: Status: ACTIVE | Noted: 2023-01-31

## 2023-09-15 PROBLEM — F31.9 BIPOLAR 1 DISORDER: Status: ACTIVE | Noted: 2023-01-31

## 2023-09-15 PROBLEM — R00.0 WIDE-COMPLEX TACHYCARDIA: Status: ACTIVE | Noted: 2023-01-31

## 2023-09-15 PROBLEM — I50.21 ACUTE HFREF (HEART FAILURE WITH REDUCED EJECTION FRACTION): Status: ACTIVE | Noted: 2023-02-01

## 2023-09-15 PROCEDURE — G0463 HOSPITAL OUTPT CLINIC VISIT: HCPCS | Performed by: INTERNAL MEDICINE

## 2023-09-15 RX ORDER — OXYCODONE HYDROCHLORIDE AND ACETAMINOPHEN 5; 325 MG/1; MG/1
1 TABLET ORAL EVERY 6 HOURS PRN
Qty: 90 TABLET | Refills: 0 | Status: SHIPPED | OUTPATIENT
Start: 2023-09-15

## 2023-09-15 NOTE — PROGRESS NOTES
Chief Complaint  Malignant Neoplasm of Lower Thrid of Esophagus     Gary Stein, *  Shyanne Grijalva, LAUREN Ren          Shashank Saunders presents to [facility] for GEJ squamous cell carcinoma    Mr. 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  Patient reports about 3-month history of dysphagia that initially started off to solids but then progressed to liquids.  This was associated with about a 40 pound weight loss.  He presented to the [facility] ED on September 8 due to the symptoms.  CT scan of the chest with IV contrast is demonstrated mildly dilated fluid-filled esophagus and emphysema.  Partially solid opacity that was 6 mm and solid in the right upper lobe was seen as well.  EGD performed on September 9 showed an ulcerated and irregular severe stenosis in the lower third of the esophagus.  Dilation was performed and biopsy was taken.  Biopsy showed poorly differentiated squamous cell carcinoma patient states that his swallowing was greatly improved for a few days after this procedure.  He is still able to eat solids but is slightly more difficult currently.  He has gained some weight.  He does have some pain in his neck and chest as well as on the right side of his chest.  He states Aleve does not help with this pain.  He denies any diarrhea or constipation.  He does report some numbness in his hands.    Oncology/Hematology History Overview Note   9/8/23: Presented to [facility] ED with inability to tolerate solids and 40 lb weight loss. CT Chest showed CT scan of the chest with IV contrast demonstrating moderately dilated fluid-filled esophagus. Emphysema. Part  solid opacity 6 mm in the right upper lobe.     9/9/23: EGD performed showing an ulcerated and irregular severe stenosis in the lower third of the esophagus, that was dilated and biopsied. GEJ biopsy showing poorly differentiated squamous cell carcinoma.      Malignant neoplasm of lower third of esophagus   9/14/2023 Initial Diagnosis    Malignant neoplasm of lower third of esophagus           Review of Systems   Constitutional:  Positive for appetite change (Decreased) and fatigue.   HENT:  Positive for sore throat and trouble swallowing.    Genitourinary:  Positive for flank pain (RT side).   Neurological:  Positive for weakness.   Psychiatric/Behavioral:  The patient is nervous/anxious.    All other systems reviewed and are negative.  Current Outpatient Medications on File Prior to Visit   Medication Sig Dispense Refill    albuterol (PROVENTIL) (2.5 MG/3ML) 0.083% nebulizer solution Inhale 2.5 mg.      atorvastatin (LIPITOR) 20 MG tablet Take 1 tablet by mouth Every Night.      carvedilol (COREG) 12.5 MG tablet Take 1 tablet by mouth Every 12 (Twelve) Hours.      Eliquis 5 MG tablet tablet Take 1 tablet by mouth Every 12 (Twelve) Hours.      lisinopril (PRINIVIL,ZESTRIL) 10 MG tablet Take 1 tablet by mouth Daily.      metFORMIN (GLUCOPHAGE) 500 MG tablet Take 1 tablet by mouth 2 (Two) Times a Day With Meals.      ondansetron ODT (ZOFRAN-ODT) 4 MG disintegrating tablet Place 1 tablet on the tongue Every 8 (Eight) Hours As Needed.      pantoprazole (PROTONIX) 40 MG EC tablet Take 1 tablet by mouth 2 (Two) Times a Day Before Meals. 60 tablet 2    QUEtiapine (SEROquel) 200 MG tablet Take 1 tablet by mouth Every Night.      sucralfate (CARAFATE) 1 g tablet Take 1 tablet by mouth 3 (Three) Times a Day Before Meals. 90 tablet 1     No current facility-administered medications on file prior to visit.       No Known Allergies  Past Medical History:   Diagnosis Date    Asthma     Congestive heart failure (CHF)     Coronary  "artery disease     Heart attack     Hypertension      Past Surgical History:   Procedure Laterality Date    CARDIAC CATHETERIZATION      CARDIAC SURGERY      Stent x1    ENDOSCOPY N/A 9/9/2023    Procedure: ESOPHAGOGASTRODUODENOSCOPY WITH ESOPHAGEAL BALLOON DILATATION/BIOPSIES;  Surgeon: Gary Stein MD;  Location: [facility] ENDOSCOPY;  Service: Gastroenterology;  Laterality: N/A;  ULCERATED STRICTURE OF GE JUNCTION     Social History     Socioeconomic History    Marital status:    Tobacco Use    Smoking status: Every Day     Packs/day: 0.50     Types: Cigarettes    Smokeless tobacco: Never   Vaping Use    Vaping Use: Never used   Substance and Sexual Activity    Alcohol use: Not Currently    Drug use: Not Currently     Types: Cocaine(coke), Marijuana    Sexual activity: Defer     History reviewed. No pertinent family history.    Objective   Physical Exam  Well appearing patient in no acute distress on RA  Anicteric sclerae, no rash on exposed skin  Respirations non-labored  Awake, alert, and oriented x 4. Speech intact. No gross neurologic deficit  Appropriate mood and affect    Vitals:    09/15/23 0922   BP: 148/92   Pulse: 70   Resp: 18   Temp: 97.5 °F (36.4 °C)   SpO2: 99%   Weight: 93.4 kg (205 lb 14.6 oz)   Height: 177.8 cm (70\")   PainSc:   9   PainLoc: Throat               PHQ-9 Total Score:                      Result Review :   The following data was reviewed by: Winston Johnson MD on 09/15/23:  Lab Results   Component Value Date    HGB 14.3 09/09/2023    HCT 43.7 09/09/2023    MCV 90.7 09/09/2023     09/09/2023    WBC 9.05 09/09/2023    NEUTROABS 3.42 09/09/2023    LYMPHSABS 4.31 (H) 09/09/2023    MONOSABS 0.88 09/09/2023    EOSABS 0.36 09/09/2023    BASOSABS 0.07 09/09/2023     Lab Results   Component Value Date    GLUCOSE 83 09/09/2023    BUN 27 (H) 09/09/2023    CREATININE 1.08 09/09/2023     09/09/2023    K 3.5 09/09/2023     09/09/2023    CO2 24.4 09/09/2023    " CALCIUM 8.9 09/09/2023    PROTEINTOT 6.0 09/09/2023    ALBUMIN 3.6 09/09/2023    BILITOT 0.7 09/09/2023    ALKPHOS 59 09/09/2023    AST 19 09/09/2023    ALT 20 09/09/2023     Lab Results   Component Value Date    MG 1.8 09/09/2023     Labs personally reviewed and essentially normal.          Discharge summary personally reviewed    EGD report personally reviewed    CT personally reviewed and per my independent read with dilated esophagus.    CT Chest With Contrast Diagnostic    Result Date: 9/8/2023    CT scan of the chest with IV contrast demonstrating moderately dilated fluid-filled esophagus.  The differential diagnosis includes achalasia, stricture, and mass lesion.  Emphysema.  Part solid opacity in the right upper lobe.  Short-term follow-up CT scan of the chest is recommended in 3 months.     MISBAH BARNARD MD       Electronically Signed and Approved By: MISBAH BARNARD MD on 9/08/2023 at 16:57             XR Chest 1 View    Result Date: 9/8/2023   Under inflated lungs with bibasilar atelectasis.  No acute cardiopulmonary process.       SAE DREW DO       Electronically Signed and Approved By: SAE DREW DO on 9/08/2023 at 13:26                  Assessment and Plan    Diagnoses and all orders for this visit:    1. Malignant neoplasm of lower third of esophagus (Primary)  -     Ambulatory Referral to ONC Social Work  -     NM PET/CT Skull Base to Mid Thigh; Future  -     Ambulatory Referral to General Surgery  -     Ambulatory Referral to Thoracic Surgery  -     Ambulatory Referral to Radiation Oncology  -     oxyCODONE-acetaminophen (PERCOCET) 5-325 MG per tablet; Take 1 tablet by mouth Every 6 (Six) Hours As Needed for Moderate Pain.  Dispense: 90 tablet; Refill: 0    2. Neoplasm related pain    GEJ squamous cell carcinoma  CT Chest without any metastatic disease, however patinet with incomplete staging. Will order PET scan. Pending results of PET scan, patient may need EUS as well for more  definitive staging. Discussed that the treatment of his disease is dependent on the stage. He has been referred to both radiation oncology as well as thoracic surgery. There is a possibility of surgery alone dependent on final staging. More likely is for concurrent preoperative chemoradiation with weekly Carboplatin and Paclitaxel combined with radiation followed by surgery. This was discussed with patient but of course final plan pending. Risk, benefit, side effect profile discussed in detail and consent obtained. Will need port but will hold off on referrla pending final treatment plan. Did discuss the likelihood of PEG tube placement, but will hold for now as patient eating better. RTC 2 weeks.    Neoplasm related pain  Add Percocet 5-325 mg q6h PRN. Continue PRN NSAID.      This is an acute or chronic illness that poses a threat to life or bodily function. The above treatment plan involves a high risk of complications and/or mortality of patient management.    The patient was seen and examined. Work by the provider also included review and/or ordering of lab tests, review and/or ordering of radiology tests, review and/or ordering of medicine tests, discussion with other physicians or providers, independent review of data, obtaining old records, review/summation of old records, and/or other review.     I have reviewed the family history, social history, and past medical history for this patient. Previous information and data has been reviewed and updated as needed. I have reviewed and verified the chief complaint, history, and other documentation. The patient was interviewed and examined at the bedside and the chart reviewed. The previous observations, recommendations, and conclusions were reviewed including those of other providers.     The plan was discussed with the patient and/or family. The patient was given time to ask questions and these questions were answered. At the conclusion of their visit they had no  additional questions or concerns and all questions were answered to their satisfaction.      I spent 60 minutes caring for Shashank on this date of service. This time includes time spent by me in the following activities:preparing for the visit, reviewing tests, obtaining and/or reviewing a separately obtained history, performing a medically appropriate examination and/or evaluation , counseling and educating the patient/family/caregiver, ordering medications, tests, or procedures, referring and communicating with other health care professionals , documenting information in the medical record, independently interpreting results and communicating that information with the patient/family/caregiver, and care coordination    Patient Follow Up: 2 weeks  Patient was given instructions and counseling regarding his condition or for health maintenance advice. Please see specific information pulled into the AVS if appropriate.

## 2023-09-18 ENCOUNTER — TELEPHONE (OUTPATIENT)
Dept: GASTROENTEROLOGY | Facility: CLINIC | Age: 58
End: 2023-09-18

## 2023-09-18 NOTE — TELEPHONE ENCOUNTER
Caller: VANESSA VANG    Relationship: Emergency Contact    Best call back number: [phone]     Who are you requesting to speak with (clinical staff, provider,  specific staff member):  CLINICAL    Do you know the name of the person who called: AGGIE    What was the call regarding: EGD SCHEDULING    Is it okay if the provider responds through MyChart: YES

## 2023-09-18 NOTE — TELEPHONE ENCOUNTER
Patient returned call with PAT. Attempted to return patient call. Left a vm requesting a return call.

## 2023-09-18 NOTE — TELEPHONE ENCOUNTER
Attempted to contact pt in regards to scheduling egd. Pt did not answer. I left a  requesting a return call.   I also attempted to contact pts spouse. Pts spouse number is not in service.   [employer] message also sent.

## 2023-09-21 ENCOUNTER — TELEPHONE (OUTPATIENT)
Dept: RADIATION ONCOLOGY | Facility: HOSPITAL | Age: 58
End: 2023-09-21
Payer: MEDICARE

## 2023-09-21 NOTE — TELEPHONE ENCOUNTER
Called [employer] to inform multiple attempts have been made to contact pt to schedule consult appt. Pt has no called back or responded by [employer]. [facility] will notify Dr. Johnson.

## 2023-09-22 ENCOUNTER — TELEPHONE (OUTPATIENT)
Dept: GASTROENTEROLOGY | Facility: CLINIC | Age: 58
End: 2023-09-22
Payer: MEDICARE

## 2023-09-22 NOTE — TELEPHONE ENCOUNTER
Attempted to contact pt to advise blood thinner suspension instructions,Left voicemail requesting a returned call.

## 2023-09-25 ENCOUNTER — TELEPHONE (OUTPATIENT)
Dept: ONCOLOGY | Facility: HOSPITAL | Age: 58
End: 2023-09-25

## 2023-09-25 NOTE — TELEPHONE ENCOUNTER
OSW attempted to contact patient at the phone number listed in the chart.  OSW was unable to leave a message due to mailbox being full.  OSW then contacted the patient's spouse at the phone number listed in the chart and was able to leave a message to introduce oncology social work services and provided a callback number if they needed resources to remove any barriers to care or unmet needs.

## 2023-09-29 ENCOUNTER — CONSULT (OUTPATIENT)
Dept: RADIATION ONCOLOGY | Facility: HOSPITAL | Age: 58
End: 2023-09-29
Payer: MEDICARE

## 2023-09-29 VITALS
RESPIRATION RATE: 16 BRPM | DIASTOLIC BLOOD PRESSURE: 93 MMHG | BODY MASS INDEX: 28.56 KG/M2 | HEART RATE: 76 BPM | WEIGHT: 199.08 LBS | SYSTOLIC BLOOD PRESSURE: 152 MMHG | TEMPERATURE: 97.9 F | OXYGEN SATURATION: 98 %

## 2023-09-29 DIAGNOSIS — C15.5 MALIGNANT NEOPLASM OF LOWER THIRD OF ESOPHAGUS: Primary | ICD-10-CM

## 2023-09-29 PROCEDURE — G0463 HOSPITAL OUTPT CLINIC VISIT: HCPCS | Performed by: RADIOLOGY

## 2023-09-29 NOTE — PROGRESS NOTES
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  Mr. Saunders has a long history of smoking as well as a history of gastroesophageal reflux.  He has experienced an approximate 40 pound weight loss over the past 3 months.  He has had progressive difficulty swallowing foods and does have some occasional emesis but denies hematemesis.  He additionally denies melena.  He underwent CT scan of the chest with IV contrast demonstrating a mildly dilated, fluid-filled esophagus.  EGD performed on 9/9/2023 revealed an ulcerated and irregular severe stenosis in the lower third of the esophagus.  Dilation was performed.  Pathology from biopsy performed today revealed squamous cell carcinoma, grade 3.     Subjective        Review of Systems: Review of Systems   Constitutional:  Positive for appetite change (Unable to eat due to difficulty swallowing/food comes back up.), fatigue (9/10) and unexpected weight change (40lb weight loss over 1.5 months.).   HENT:  Positive for sore throat (Slight) and trouble swallowing (States that food is difficult to go down, comes back up.). Negative for hearing loss, tinnitus and voice change.    Eyes:  Negative for visual disturbance.   Respiratory:  Negative for cough, chest tightness and shortness of breath.    Cardiovascular:  Negative for chest pain, palpitations and leg swelling.   Gastrointestinal:  Positive for vomiting (When food gets stuck and comes back up.). Negative for anal bleeding, blood in  stool, constipation, diarrhea and nausea.   Genitourinary:  Negative for difficulty urinating, dysuria, frequency, hematuria and urgency.   Musculoskeletal:  Positive for arthralgias (Generalized, new) and back pain (New). Negative for joint swelling.   Skin:  Negative for color change and rash.   Neurological:  Positive for weakness (Generalized, newer), numbness (Left hand, new) and headaches (Almost daily, different times of the day.). Negative for dizziness.   Psychiatric/Behavioral:  Positive for sleep disturbance (Due to pain.). Negative for self-injury and suicidal ideas.      Past Medical History:   Past Medical History:   Diagnosis Date    Asthma     Congestive heart failure (CHF)     Coronary artery disease     Diabetes mellitus     Esophageal cancer     Heart attack     Hypertension        Past Surgical History:   Past Surgical History:   Procedure Laterality Date    CARDIAC CATHETERIZATION      CARDIAC SURGERY      Stent x1    ENDOSCOPY N/A 9/9/2023    Procedure: ESOPHAGOGASTRODUODENOSCOPY WITH ESOPHAGEAL BALLOON DILATATION/BIOPSIES;  Surgeon: Gary Stein MD;  Location: [facility] ENDOSCOPY;  Service: Gastroenterology;  Laterality: N/A;  ULCERATED STRICTURE OF GE JUNCTION       Family History: History reviewed. No pertinent family history.    Social History:   Social History     Socioeconomic History    Marital status:    Tobacco Use    Smoking status: Every Day     Packs/day: 0.50     Types: Cigarettes     Start date: 1979    Smokeless tobacco: Never   Vaping Use    Vaping Use: Never used   Substance and Sexual Activity    Alcohol use: Not Currently    Drug use: Not Currently     Types: Cocaine(coke), Marijuana    Sexual activity: Defer       Medications:     Current Outpatient Medications:     albuterol (PROVENTIL) (2.5 MG/3ML) 0.083% nebulizer solution, Inhale 2.5 mg., Disp: , Rfl:     atorvastatin (LIPITOR) 20 MG tablet, Take 1 tablet by mouth Every Night., Disp: , Rfl:     carvedilol  "(COREG) 12.5 MG tablet, Take 1 tablet by mouth Every 12 (Twelve) Hours., Disp: , Rfl:     Eliquis 5 MG tablet tablet, Take 1 tablet by mouth Every 12 (Twelve) Hours., Disp: , Rfl:     lisinopril (PRINIVIL,ZESTRIL) 10 MG tablet, Take 1 tablet by mouth Daily., Disp: , Rfl:     metFORMIN (GLUCOPHAGE) 500 MG tablet, Take 1 tablet by mouth 2 (Two) Times a Day With Meals., Disp: , Rfl:     ondansetron ODT (ZOFRAN-ODT) 4 MG disintegrating tablet, Place 1 tablet on the tongue Every 8 (Eight) Hours As Needed., Disp: , Rfl:     oxyCODONE-acetaminophen (PERCOCET) 5-325 MG per tablet, Take 1 tablet by mouth Every 6 (Six) Hours As Needed for Moderate Pain., Disp: 90 tablet, Rfl: 0    pantoprazole (PROTONIX) 40 MG EC tablet, Take 1 tablet by mouth 2 (Two) Times a Day Before Meals., Disp: 60 tablet, Rfl: 2    QUEtiapine (SEROquel) 200 MG tablet, Take 1 tablet by mouth Every Night., Disp: , Rfl:     sucralfate (CARAFATE) 1 g tablet, Take 1 tablet by mouth 3 (Three) Times a Day Before Meals., Disp: 90 tablet, Rfl: 1    Allergies:   No Known Allergies    Pain: (on a scale of 0-10)   Pain Score    09/29/23 0938   PainSc: 10-Worst pain ever  Comment: \"All over\"       Advanced Care Plan: N   Quality of Life: 90 - Limited Activity    Objective     Physical Exam:   Vital Signs:   Vitals:    09/29/23 0938   BP: 152/93   Pulse: 76   Resp: 16   Temp: 97.9 °F (36.6 °C)   TempSrc: Temporal   SpO2: 98%   Weight: 90.3 kg (199 lb 1.2 oz)   PainSc: 10-Worst pain ever  Comment: \"All over\"     Body mass index is 28.56 kg/m².     Physical Exam  Constitutional:       General: He is not in acute distress.     Appearance: Normal appearance. He is not ill-appearing, toxic-appearing or diaphoretic.   HENT:      Head: Normocephalic and atraumatic.   Pulmonary:      Effort: Pulmonary effort is normal. No respiratory distress.   Abdominal:      General: There is no distension.   Skin:     General: Skin is warm and dry.   Neurological:      General: No focal " deficit present.      Mental Status: He is alert and oriented to person, place, and time.      Cranial Nerves: No cranial nerve deficit.      Gait: Gait normal.   Psychiatric:         Mood and Affect: Mood normal.         Behavior: Behavior normal.         Judgment: Judgment normal.       Results:   Radiographs: CT Chest With Contrast Diagnostic    Result Date: 9/8/2023    CT scan of the chest with IV contrast demonstrating moderately dilated fluid-filled esophagus.  The differential diagnosis includes achalasia, stricture, and mass lesion.  Emphysema.  Part solid opacity in the right upper lobe.  Short-term follow-up CT scan of the chest is recommended in 3 months.     MISBAH BARNARD MD       Electronically Signed and Approved By: MISBAH BARNARD MD on 9/08/2023 at 16:57             XR Chest 1 View    Result Date: 9/8/2023   Under inflated lungs with bibasilar atelectasis.  No acute cardiopulmonary process.       SAE DREW DO       Electronically Signed and Approved By: SAE DREW DO on 9/08/2023 at 13:26            I personally reviewed the CT scan of the chest with contrast performed on 9/8/2023.  The pertinent findings are as above in HPI.      Pathology: I personally reviewed the pathology report from the procedure performed on 9/9/2023.  The pertinent findings are as above in HPI.      Labs:   WBC   Date Value Ref Range Status   09/09/2023 9.05 3.40 - 10.80 10*3/mm3 Final     Hemoglobin   Date Value Ref Range Status   09/09/2023 14.3 13.0 - 17.7 g/dL Final     Hematocrit   Date Value Ref Range Status   09/09/2023 43.7 37.5 - 51.0 % Final     Platelets   Date Value Ref Range Status   09/09/2023 265 140 - 450 10*3/mm3 Final       Assessment / Plan      Assessment/Plan:   Shashank Saunders is a 58-year-old gentleman with squamous cell carcinoma of the lower third of the esophagus.  He has no clinical or radiographic evidence of regional or distant metastatic disease.  He has yet to undergo PET/CT as part of  staging.  ECOG 1    I discussed the clinical, radiographic and pathologic findings to date with Mr. Saunders.  I explained the role of radiotherapy in the neoadjuvant management of esophageal cancer, outlining the rationale for this approach.  I recommended concurrent chemoradiation prior to undergoing esophagectomy, outlining the risks, benefits and alternatives of this approach.  Mr. Saunders will undergo PET/CT and return for CT simulation for treatment planning purposes.      Javy George MD  Radiation Oncology  [facility]    This document has been signed by Javy George MD on September 29, 2023 10:59 EDT

## 2023-10-02 ENCOUNTER — HOSPITAL ENCOUNTER (OUTPATIENT)
Dept: RADIATION ONCOLOGY | Facility: HOSPITAL | Age: 58
Setting detail: RADIATION/ONCOLOGY SERIES
End: 2023-10-02
Payer: MEDICARE

## 2023-10-02 ENCOUNTER — TELEPHONE (OUTPATIENT)
Dept: NUTRITION | Facility: HOSPITAL | Age: 58
End: 2023-10-02
Payer: MEDICARE

## 2023-10-02 NOTE — PROGRESS NOTES
10/2/23:    Nutrition referral received:  significant wt decline noted.  Pt with esophageal Ca (lower 3rd region).  Attempted to call pt on 9/26/23 and again today 10/2/23.  There was no answer at both attempts.  Did not leave VM due to generic mailbox greeting was received.  Pt has an upcoming appointment on Thursday 10/5/23 in radiation oncology for his radiation oncology CT sim.  Will attempt to speak with pt at that time.    Oncology RD remains available per protocol.

## 2023-10-03 ENCOUNTER — OFFICE VISIT (OUTPATIENT)
Dept: ONCOLOGY | Facility: HOSPITAL | Age: 58
End: 2023-10-03
Payer: MEDICARE

## 2023-10-03 ENCOUNTER — HOSPITAL ENCOUNTER (OUTPATIENT)
Dept: PET IMAGING | Facility: HOSPITAL | Age: 58
Discharge: HOME | End: 2023-10-03
Payer: MEDICARE

## 2023-10-03 ENCOUNTER — TELEPHONE (OUTPATIENT)
Dept: GASTROENTEROLOGY | Facility: CLINIC | Age: 58
End: 2023-10-03
Payer: MEDICARE

## 2023-10-03 VITALS
WEIGHT: 199.08 LBS | TEMPERATURE: 97.4 F | OXYGEN SATURATION: 96 % | RESPIRATION RATE: 16 BRPM | HEART RATE: 96 BPM | BODY MASS INDEX: 28.56 KG/M2 | SYSTOLIC BLOOD PRESSURE: 152 MMHG | DIASTOLIC BLOOD PRESSURE: 95 MMHG

## 2023-10-03 DIAGNOSIS — C15.5 MALIGNANT NEOPLASM OF LOWER THIRD OF ESOPHAGUS: Primary | ICD-10-CM

## 2023-10-03 DIAGNOSIS — G89.3 NEOPLASM RELATED PAIN: ICD-10-CM

## 2023-10-03 DIAGNOSIS — C15.5 MALIGNANT NEOPLASM OF LOWER THIRD OF ESOPHAGUS: ICD-10-CM

## 2023-10-03 PROCEDURE — 78815 PET IMAGE W/CT SKULL-THIGH: CPT

## 2023-10-03 PROCEDURE — 0 FLUDEOXYGLUCOSE F18 SOLUTION: Performed by: INTERNAL MEDICINE

## 2023-10-03 PROCEDURE — G0463 HOSPITAL OUTPT CLINIC VISIT: HCPCS | Performed by: INTERNAL MEDICINE

## 2023-10-03 PROCEDURE — A9552 F18 FDG: HCPCS | Performed by: INTERNAL MEDICINE

## 2023-10-03 RX ORDER — ONDANSETRON HYDROCHLORIDE 8 MG/1
8 TABLET, FILM COATED ORAL 3 TIMES DAILY PRN
Qty: 30 TABLET | Refills: 5 | Status: SHIPPED | OUTPATIENT
Start: 2023-10-03

## 2023-10-03 RX ADMIN — FLUDEOXYGLUCOSE F18 1 DOSE: 300 INJECTION INTRAVENOUS at 12:34

## 2023-10-03 NOTE — PROGRESS NOTES
Chief Complaint  Malignant neoplasm of lower third of esophagus    Shyanne Grijalva, Shyanne Woods, LAUREN    Subjective          Shashank Saunders presents to [facility] for GEJ squamous cell carcinoma    Mr. 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  He has seen Dr. Chau with thoracic surgery and Dr. George with radiation oncology. He had PET done today. He reports his dysphagia has good days and bad days. He has been eating a lot of [employer]. He reports the pain medicine that he is on does help with the pain. He has random episodes of numbness of his left forearm and hand. No pain in hand or neck. He also reports low back pain as well. Weight is stable. He has plans to start radiation planning soon.     Oncology/Hematology History Overview Note   9/8/23: Presented to [facility] ED with inability to tolerate solids and 40 lb weight loss. CT Chest showed CT scan of the chest with IV contrast demonstrating moderately dilated fluid-filled esophagus. Emphysema. Part solid opacity 6 mm in the right upper lobe.     9/9/23: EGD performed showing an ulcerated and irregular severe stenosis in the lower third of the esophagus, that was dilated and biopsied. GEJ biopsy showing poorly differentiated squamous cell carcinoma.      Malignant neoplasm of lower third of esophagus   9/14/2023 Initial Diagnosis    Malignant neoplasm of lower third of esophagus           Review of Systems   Constitutional:  Positive for appetite change (Decreased) and fatigue.   HENT:  Positive for sore throat and trouble swallowing.    Genitourinary:  Positive for flank pain (RT side).   Musculoskeletal:  Positive for arthralgias and back  pain.   Neurological:  Positive for weakness.   Psychiatric/Behavioral:  The patient is nervous/anxious.    All other systems reviewed and are negative.  Current Outpatient Medications on File Prior to Visit   Medication Sig Dispense Refill    albuterol (PROVENTIL) (2.5 MG/3ML) 0.083% nebulizer solution Inhale 2.5 mg.      atorvastatin (LIPITOR) 20 MG tablet Take 1 tablet by mouth Every Night.      carvedilol (COREG) 12.5 MG tablet Take 1 tablet by mouth Every 12 (Twelve) Hours.      Eliquis 5 MG tablet tablet Take 1 tablet by mouth Every 12 (Twelve) Hours.      lisinopril (PRINIVIL,ZESTRIL) 10 MG tablet Take 1 tablet by mouth Daily.      metFORMIN (GLUCOPHAGE) 500 MG tablet Take 1 tablet by mouth 2 (Two) Times a Day With Meals.      ondansetron ODT (ZOFRAN-ODT) 4 MG disintegrating tablet Place 1 tablet on the tongue Every 8 (Eight) Hours As Needed.      oxyCODONE-acetaminophen (PERCOCET) 5-325 MG per tablet Take 1 tablet by mouth Every 6 (Six) Hours As Needed for Moderate Pain. 90 tablet 0    pantoprazole (PROTONIX) 40 MG EC tablet Take 1 tablet by mouth 2 (Two) Times a Day Before Meals. 60 tablet 2    QUEtiapine (SEROquel) 200 MG tablet Take 1 tablet by mouth Every Night.      sucralfate (CARAFATE) 1 g tablet Take 1 tablet by mouth 3 (Three) Times a Day Before Meals. 90 tablet 1     No current facility-administered medications on file prior to visit.       No Known Allergies  Past Medical History:   Diagnosis Date    Asthma     Congestive heart failure (CHF)     Coronary artery disease     Diabetes mellitus     Esophageal cancer     Heart attack     Hypertension      Past Surgical History:   Procedure Laterality Date    CARDIAC CATHETERIZATION      CARDIAC SURGERY      Stent x1    ENDOSCOPY N/A 9/9/2023    Procedure: ESOPHAGOGASTRODUODENOSCOPY WITH ESOPHAGEAL BALLOON DILATATION/BIOPSIES;  Surgeon: Gary Stein MD;  Location: [facility] ENDOSCOPY;  Service: Gastroenterology;  Laterality: N/A;  ULCERATED  STRICTURE OF GE JUNCTION     Social History     Socioeconomic History    Marital status:    Tobacco Use    Smoking status: Every Day     Packs/day: 0.50     Types: Cigarettes     Start date:     Smokeless tobacco: Never   Vaping Use    Vaping Use: Never used   Substance and Sexual Activity    Alcohol use: Not Currently    Drug use: Not Currently     Types: Cocaine(coke), Marijuana    Sexual activity: Defer     History reviewed. No pertinent family history.    Objective   Physical Exam  Well appearing patient in no acute distress on RA  Anicteric sclerae, no rash on exposed skin  Respirations non-labored  Awake, alert, and oriented x 4. Speech intact. No gross neurologic deficit  Appropriate mood and affect    ECO    Vitals:    10/03/23 1323   BP: 152/95   Pulse: 96   Resp: 16   Temp: 97.4 °F (36.3 °C)   TempSrc: Temporal   SpO2: 96%   Weight: 90.3 kg (199 lb 1.2 oz)   PainSc: 10-Worst pain ever   PainLoc: Back               PHQ-9 Total Score:           Result Review :   The following data was reviewed by: Winston Johnson MD on 10/03/23:  Lab Results   Component Value Date    HGB 14.3 2023    HCT 43.7 2023    MCV 90.7 2023     2023    WBC 9.05 2023    NEUTROABS 3.42 2023    LYMPHSABS 4.31 (H) 2023    MONOSABS 0.88 2023    EOSABS 0.36 2023    BASOSABS 0.07 2023     Lab Results   Component Value Date    GLUCOSE 83 2023    BUN 27 (H) 2023    CREATININE 1.08 2023     2023    K 3.5 2023     2023    CO2 24.4 2023    CALCIUM 8.9 2023    PROTEINTOT 6.0 2023    ALBUMIN 3.6 2023    BILITOT 0.7 2023    ALKPHOS 59 2023    AST 19 2023    ALT 20 2023     Lab Results   Component Value Date    MG 1.8 2023     Labs personally reviewed and essentially normal.     Rad onc note personally reviewed    CT Chest With Contrast Diagnostic    Result Date:  9/8/2023    CT scan of the chest with IV contrast demonstrating moderately dilated fluid-filled esophagus.  The differential diagnosis includes achalasia, stricture, and mass lesion.  Emphysema.  Part solid opacity in the right upper lobe.  Short-term follow-up CT scan of the chest is recommended in 3 months.     MISBAH BARNARD MD       Electronically Signed and Approved By: MISBAH BARNARD MD on 9/08/2023 at 16:57             XR Chest 1 View    Result Date: 9/8/2023   Under inflated lungs with bibasilar atelectasis.  No acute cardiopulmonary process.       SAE DREW DO       Electronically Signed and Approved By: SAE DREW DO on 9/08/2023 at 13:26                  Assessment and Plan    Diagnoses and all orders for this visit:    1. Malignant neoplasm of lower third of esophagus (Primary)  -     ondansetron (ZOFRAN) 8 MG tablet; Take 1 tablet by mouth 3 (Three) Times a Day As Needed for Nausea or Vomiting.  Dispense: 30 tablet; Refill: 5    2. Neoplasm related pain    GEJ squamous cell carcinoma  CT Chest without any metastatic disease, PET scan 10/3/23 for complete staging, read pending. He has been seen by Dr. Chau with thoracic surgery and Dr. George with radiation oncology. Assuming local disease, plan will be for concurrent preoperative chemoradiation with weekly Carboplatin and Paclitaxel combined with radiation followed by surgery. Chemo consent previously obtained. If PET with advanced disease, will have change in plan. Will refer for port. Again discuss the possibility of PEG tube placement, but will hold for now as patient eating better. PRN anti-emetics     Neoplasm related pain  Continue Percocet 5-325 mg q6h PRN. Continue PRN NSAID.      This is an acute or chronic illness that poses a threat to life or bodily function. The above treatment plan involves a high risk of complications and/or mortality of patient management.    I spent 25 minutes caring for Shashank on this date of service. This  time includes time spent by me in the following activities:preparing for the visit, reviewing tests, obtaining and/or reviewing a separately obtained history, performing a medically appropriate examination and/or evaluation , counseling and educating the patient/family/caregiver, ordering medications, tests, or procedures, referring and communicating with other health care professionals , documenting information in the medical record, independently interpreting results and communicating that information with the patient/family/caregiver, and care coordination    Patient Follow Up: Cycle 1 chemo  Patient was given instructions and counseling regarding his condition or for health maintenance advice. Please see specific information pulled into the AVS if appropriate.

## 2023-10-03 NOTE — TELEPHONE ENCOUNTER
Patients wife has called the office and states they would like to cancel procedure due to patient receiving cancer treatments.   Attempted to contact pt to see if they would like to r/s procedure. Left a vm requesting a return call if would like to r/s. Procedure cancelled.

## 2023-10-04 ENCOUNTER — OFFICE VISIT (OUTPATIENT)
Dept: SURGERY | Facility: CLINIC | Age: 58
End: 2023-10-04
Payer: MEDICARE

## 2023-10-04 ENCOUNTER — PREP FOR SURGERY (OUTPATIENT)
Dept: OTHER | Facility: HOSPITAL | Age: 58
End: 2023-10-04
Payer: MEDICARE

## 2023-10-04 VITALS
BODY MASS INDEX: 26.92 KG/M2 | WEIGHT: 188 LBS | HEART RATE: 84 BPM | HEIGHT: 70 IN | DIASTOLIC BLOOD PRESSURE: 93 MMHG | SYSTOLIC BLOOD PRESSURE: 150 MMHG

## 2023-10-04 DIAGNOSIS — C15.9 MALIGNANT NEOPLASM OF ESOPHAGUS, UNSPECIFIED LOCATION: Primary | ICD-10-CM

## 2023-10-04 DIAGNOSIS — I87.8 POOR VENOUS ACCESS: ICD-10-CM

## 2023-10-04 DIAGNOSIS — C15.9 ESOPHAGEAL CANCER: Primary | ICD-10-CM

## 2023-10-04 PROCEDURE — 99204 OFFICE O/P NEW MOD 45 MIN: CPT | Performed by: NURSE PRACTITIONER

## 2023-10-04 PROCEDURE — 1159F MED LIST DOCD IN RCRD: CPT | Performed by: NURSE PRACTITIONER

## 2023-10-04 PROCEDURE — 1160F RVW MEDS BY RX/DR IN RCRD: CPT | Performed by: NURSE PRACTITIONER

## 2023-10-04 PROCEDURE — 3080F DIAST BP >= 90 MM HG: CPT | Performed by: NURSE PRACTITIONER

## 2023-10-04 PROCEDURE — 3077F SYST BP >= 140 MM HG: CPT | Performed by: NURSE PRACTITIONER

## 2023-10-04 RX ORDER — SODIUM CHLORIDE 0.9 % (FLUSH) 0.9 %
10 SYRINGE (ML) INJECTION AS NEEDED
OUTPATIENT
Start: 2023-10-04

## 2023-10-04 RX ORDER — SODIUM CHLORIDE 9 MG/ML
40 INJECTION, SOLUTION INTRAVENOUS AS NEEDED
OUTPATIENT
Start: 2023-10-04

## 2023-10-04 RX ORDER — SODIUM CHLORIDE 0.9 % (FLUSH) 0.9 %
10 SYRINGE (ML) INJECTION EVERY 12 HOURS SCHEDULED
OUTPATIENT
Start: 2023-10-04

## 2023-10-04 RX ORDER — SODIUM CHLORIDE, SODIUM LACTATE, POTASSIUM CHLORIDE, CALCIUM CHLORIDE 600; 310; 30; 20 MG/100ML; MG/100ML; MG/100ML; MG/100ML
100 INJECTION, SOLUTION INTRAVENOUS CONTINUOUS
OUTPATIENT
Start: 2023-10-04

## 2023-10-04 RX ORDER — CEFAZOLIN SODIUM 2 G/100ML
2000 INJECTION, SOLUTION INTRAVENOUS ONCE
OUTPATIENT
Start: 2023-10-12

## 2023-10-04 NOTE — PROGRESS NOTES
Chief Complaint: port consult    Subjective      Port consultation       History of Present Illness  Shashank Saunders is a 58 y.o. male presents to [facility] for port consultation.    Mr. 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      He has seen Dr. Chau with thoracic surgery and Dr. George with radiation oncology. He had PET done today. He reports his dysphagia has good days and bad days. He has been eating a lot of [employer]. He reports the pain medicine that he is on does help with the pain.     Dr. Chau does not want the patient to have a peg tube, in case he needs that gastric tissue for surgery.     Patient does report that he takes Eliquis for cardiac stent.  I have instructed the patient to hold his Eliquis 2 days prior to the procedure    Objective     Past Medical History:   Diagnosis Date    Asthma     Congestive heart failure (CHF)     Coronary artery disease     Diabetes mellitus     Esophageal cancer     Heart attack     Hypertension        Past Surgical History:   Procedure Laterality Date    CARDIAC CATHETERIZATION      CARDIAC SURGERY      Stent x1    ENDOSCOPY N/A 9/9/2023    Procedure: ESOPHAGOGASTRODUODENOSCOPY WITH ESOPHAGEAL BALLOON DILATATION/BIOPSIES;  Surgeon: Gary Stein MD;  Location: [facility] ENDOSCOPY;  Service: Gastroenterology;  Laterality: N/A;  ULCERATED STRICTURE OF GE JUNCTION       Outpatient Medications Marked as Taking for the 10/4/23 encounter (Office Visit) with Anne-Marie Santos APRN   Medication Sig Dispense Refill    albuterol (PROVENTIL) (2.5 MG/3ML) 0.083% nebulizer solution Inhale 2.5 mg.      atorvastatin (LIPITOR) 20 MG tablet Take 1 tablet by mouth Every  "Night.      carvedilol (COREG) 12.5 MG tablet Take 1 tablet by mouth Every 12 (Twelve) Hours.      Eliquis 5 MG tablet tablet Take 1 tablet by mouth Every 12 (Twelve) Hours.      lisinopril (PRINIVIL,ZESTRIL) 10 MG tablet Take 1 tablet by mouth Daily.      metFORMIN (GLUCOPHAGE) 500 MG tablet Take 1 tablet by mouth 2 (Two) Times a Day With Meals.      ondansetron (ZOFRAN) 8 MG tablet Take 1 tablet by mouth 3 (Three) Times a Day As Needed for Nausea or Vomiting. 30 tablet 5    ondansetron ODT (ZOFRAN-ODT) 4 MG disintegrating tablet Place 1 tablet on the tongue Every 8 (Eight) Hours As Needed.      oxyCODONE-acetaminophen (PERCOCET) 5-325 MG per tablet Take 1 tablet by mouth Every 6 (Six) Hours As Needed for Moderate Pain. 90 tablet 0    pantoprazole (PROTONIX) 40 MG EC tablet Take 1 tablet by mouth 2 (Two) Times a Day Before Meals. 60 tablet 2    QUEtiapine (SEROquel) 200 MG tablet Take 1 tablet by mouth Every Night.      sucralfate (CARAFATE) 1 g tablet Take 1 tablet by mouth 3 (Three) Times a Day Before Meals. 90 tablet 1       No Known Allergies     History reviewed. No pertinent family history.    Social History     Socioeconomic History    Marital status:    Tobacco Use    Smoking status: Every Day     Packs/day: 0.50     Types: Cigarettes     Start date: 1979    Smokeless tobacco: Never   Vaping Use    Vaping Use: Never used   Substance and Sexual Activity    Alcohol use: Not Currently    Drug use: Not Currently     Types: Cocaine(coke), Marijuana    Sexual activity: Defer       Review of Systems   Constitutional:  Negative for chills and fever.   Gastrointestinal:  Negative for abdominal distention, abdominal pain, anal bleeding, blood in stool, constipation, diarrhea and rectal pain.      Vital Signs:   /93 (BP Location: Left arm)   Pulse 84   Ht 177.8 cm (70\")   Wt 85.3 kg (188 lb)   BMI 26.98 kg/m²      Physical Exam  Vitals and nursing note reviewed.   Constitutional:       General: He is " not in acute distress.     Appearance: Normal appearance.   HENT:      Head: Normocephalic.   Cardiovascular:      Rate and Rhythm: Normal rate.   Pulmonary:      Effort: Pulmonary effort is normal.      Breath sounds: No stridor.   Abdominal:      Palpations: Abdomen is soft.      Tenderness: There is no guarding.   Musculoskeletal:         General: No deformity. Normal range of motion.   Skin:     General: Skin is warm and dry.      Coloration: Skin is not jaundiced.   Neurological:      General: No focal deficit present.      Mental Status: He is alert and oriented to person, place, and time.   Psychiatric:         Mood and Affect: Mood normal.         Thought Content: Thought content normal.        Result Review :          []  Laboratory  []  Radiology  []  Pathology  []  Microbiology  []  EKG/Telemetry   []  Cardiology/Vascular   []  Old records  I spent 25 minutes caring for Shashank on this date of service. This time includes time spent by me in the following activities: reviewing tests, obtaining and/or reviewing a separately obtained history, performing a medically appropriate examination and/or evaluation, ordering medications, tests, or procedures, and documenting information in the medical record.     Assessment and Plan    Diagnoses and all orders for this visit:    1. Malignant neoplasm of esophagus, unspecified location (Primary)    2. Poor venous access      Instructed to Hold Eliquis 2 days prior to the procedure  Follow Up   Return for Scheduled port placement with Dr. Montejo on 10/12/2023 at [facility].    Phone PAT.    Possible risks/complications, benefits, and alternatives to surgical or invasive procedures have been explained to patient and/or legal guardian.    Patient has been evaluated and can tolerate anesthesia and/or sedation. Risks, benefits, and alternatives to anesthesia and sedation have been explained to the patient and/or legal guardian. Patient verbalizes understanding  and is willing to proceed with the above plan.     Patient was given instructions and counseling regarding his condition or for health maintenance advice. Please see specific information pulled into the AVS if appropriate.

## 2023-10-04 NOTE — H&P (VIEW-ONLY)
Chief Complaint: port consult    Subjective      Port consultation       History of Present Illness  Shashank Saunders is a 58 y.o. male presents to [facility] GENERAL SURGERY for port consultation.    Mr. 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      He has seen Dr. Chau with thoracic surgery and Dr. George with radiation oncology. He had PET done today. He reports his dysphagia has good days and bad days. He has been eating a lot of [employer]. He reports the pain medicine that he is on does help with the pain.     Dr. Chau does not want the patient to have a peg tube, in case he needs that gastric tissue for surgery.     Patient does report that he takes Eliquis for cardiac stent.  I have instructed the patient to hold his Eliquis 2 days prior to the procedure    Objective     Past Medical History:   Diagnosis Date    Asthma     Congestive heart failure (CHF)     Coronary artery disease     Diabetes mellitus     Esophageal cancer     Heart attack     Hypertension        Past Surgical History:   Procedure Laterality Date    CARDIAC CATHETERIZATION      CARDIAC SURGERY      Stent x1    ENDOSCOPY N/A 9/9/2023    Procedure: ESOPHAGOGASTRODUODENOSCOPY WITH ESOPHAGEAL BALLOON DILATATION/BIOPSIES;  Surgeon: Gary Stein MD;  Location: [facility] ENDOSCOPY;  Service: Gastroenterology;  Laterality: N/A;  ULCERATED STRICTURE OF GE JUNCTION       Outpatient Medications Marked as Taking for the 10/4/23 encounter (Office Visit) with Anne-Marie Santos APRN   Medication Sig Dispense Refill    albuterol (PROVENTIL) (2.5 MG/3ML) 0.083% nebulizer solution Inhale 2.5 mg.      atorvastatin (LIPITOR) 20 MG tablet Take 1 tablet by mouth Every  "Night.      carvedilol (COREG) 12.5 MG tablet Take 1 tablet by mouth Every 12 (Twelve) Hours.      Eliquis 5 MG tablet tablet Take 1 tablet by mouth Every 12 (Twelve) Hours.      lisinopril (PRINIVIL,ZESTRIL) 10 MG tablet Take 1 tablet by mouth Daily.      metFORMIN (GLUCOPHAGE) 500 MG tablet Take 1 tablet by mouth 2 (Two) Times a Day With Meals.      ondansetron (ZOFRAN) 8 MG tablet Take 1 tablet by mouth 3 (Three) Times a Day As Needed for Nausea or Vomiting. 30 tablet 5    ondansetron ODT (ZOFRAN-ODT) 4 MG disintegrating tablet Place 1 tablet on the tongue Every 8 (Eight) Hours As Needed.      oxyCODONE-acetaminophen (PERCOCET) 5-325 MG per tablet Take 1 tablet by mouth Every 6 (Six) Hours As Needed for Moderate Pain. 90 tablet 0    pantoprazole (PROTONIX) 40 MG EC tablet Take 1 tablet by mouth 2 (Two) Times a Day Before Meals. 60 tablet 2    QUEtiapine (SEROquel) 200 MG tablet Take 1 tablet by mouth Every Night.      sucralfate (CARAFATE) 1 g tablet Take 1 tablet by mouth 3 (Three) Times a Day Before Meals. 90 tablet 1       No Known Allergies     History reviewed. No pertinent family history.    Social History     Socioeconomic History    Marital status:    Tobacco Use    Smoking status: Every Day     Packs/day: 0.50     Types: Cigarettes     Start date: 1979    Smokeless tobacco: Never   Vaping Use    Vaping Use: Never used   Substance and Sexual Activity    Alcohol use: Not Currently    Drug use: Not Currently     Types: Cocaine(coke), Marijuana    Sexual activity: Defer       Review of Systems   Constitutional:  Negative for chills and fever.   Gastrointestinal:  Negative for abdominal distention, abdominal pain, anal bleeding, blood in stool, constipation, diarrhea and rectal pain.      Vital Signs:   /93 (BP Location: Left arm)   Pulse 84   Ht 177.8 cm (70\")   Wt 85.3 kg (188 lb)   BMI 26.98 kg/m²      Physical Exam  Vitals and nursing note reviewed.   Constitutional:       General: He is " not in acute distress.     Appearance: Normal appearance.   HENT:      Head: Normocephalic.   Cardiovascular:      Rate and Rhythm: Normal rate.   Pulmonary:      Effort: Pulmonary effort is normal.      Breath sounds: No stridor.   Abdominal:      Palpations: Abdomen is soft.      Tenderness: There is no guarding.   Musculoskeletal:         General: No deformity. Normal range of motion.   Skin:     General: Skin is warm and dry.      Coloration: Skin is not jaundiced.   Neurological:      General: No focal deficit present.      Mental Status: He is alert and oriented to person, place, and time.   Psychiatric:         Mood and Affect: Mood normal.         Thought Content: Thought content normal.        Result Review :          []  Laboratory  []  Radiology  []  Pathology  []  Microbiology  []  EKG/Telemetry   []  Cardiology/Vascular   []  Old records  I spent 25 minutes caring for Shashank on this date of service. This time includes time spent by me in the following activities: reviewing tests, obtaining and/or reviewing a separately obtained history, performing a medically appropriate examination and/or evaluation, ordering medications, tests, or procedures, and documenting information in the medical record.     Assessment and Plan    Diagnoses and all orders for this visit:    1. Malignant neoplasm of esophagus, unspecified location (Primary)    2. Poor venous access      Instructed to Hold Eliquis 2 days prior to the procedure  Follow Up   Return for Scheduled port placement with Dr. Montejo on 10/12/2023 at [facility].    Phone PAT.    Possible risks/complications, benefits, and alternatives to surgical or invasive procedures have been explained to patient and/or legal guardian.    Patient has been evaluated and can tolerate anesthesia and/or sedation. Risks, benefits, and alternatives to anesthesia and sedation have been explained to the patient and/or legal guardian. Patient verbalizes understanding  and is willing to proceed with the above plan.     Patient was given instructions and counseling regarding his condition or for health maintenance advice. Please see specific information pulled into the AVS if appropriate.

## 2023-10-05 ENCOUNTER — HOSPITAL ENCOUNTER (OUTPATIENT)
Dept: RADIATION ONCOLOGY | Facility: HOSPITAL | Age: 58
Discharge: HOME | End: 2023-10-05
Payer: MEDICARE

## 2023-10-05 ENCOUNTER — HOSPITAL ENCOUNTER (OUTPATIENT)
Dept: MRI IMAGING | Facility: HOSPITAL | Age: 58
Discharge: HOME | End: 2023-10-05
Admitting: RADIOLOGY
Payer: MEDICARE

## 2023-10-05 ENCOUNTER — DOCUMENTATION (OUTPATIENT)
Dept: RADIATION ONCOLOGY | Facility: HOSPITAL | Age: 58
End: 2023-10-05
Payer: MEDICARE

## 2023-10-05 ENCOUNTER — DOCUMENTATION (OUTPATIENT)
Dept: NUTRITION | Facility: HOSPITAL | Age: 58
End: 2023-10-05
Payer: MEDICARE

## 2023-10-05 VITALS
HEART RATE: 76 BPM | DIASTOLIC BLOOD PRESSURE: 80 MMHG | SYSTOLIC BLOOD PRESSURE: 146 MMHG | TEMPERATURE: 98.3 F | OXYGEN SATURATION: 99 %

## 2023-10-05 DIAGNOSIS — R56.9 SEIZURE-LIKE ACTIVITY: Primary | ICD-10-CM

## 2023-10-05 DIAGNOSIS — C15.5 MALIGNANT NEOPLASM OF LOWER THIRD OF ESOPHAGUS: ICD-10-CM

## 2023-10-05 DIAGNOSIS — R56.9 SEIZURE-LIKE ACTIVITY: ICD-10-CM

## 2023-10-05 PROCEDURE — 77332 RADIATION TREATMENT AID(S): CPT | Performed by: RADIOLOGY

## 2023-10-05 PROCEDURE — 77470 SPECIAL RADIATION TREATMENT: CPT | Performed by: RADIOLOGY

## 2023-10-05 PROCEDURE — A9577 INJ MULTIHANCE: HCPCS | Performed by: RADIOLOGY

## 2023-10-05 PROCEDURE — 70553 MRI BRAIN STEM W/O & W/DYE: CPT

## 2023-10-05 PROCEDURE — 0 GADOBENATE DIMEGLUMINE 529 MG/ML SOLUTION: Performed by: RADIOLOGY

## 2023-10-05 PROCEDURE — 77263 THER RADIOLOGY TX PLNG CPLX: CPT | Performed by: RADIOLOGY

## 2023-10-05 RX ADMIN — GADOBENATE DIMEGLUMINE 15 ML: 529 INJECTION, SOLUTION INTRAVENOUS at 18:12
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
## 2023-10-05 NOTE — PROGRESS NOTES
Outpatient Nutrition Oncology Assessment    Patient Name: Shashank Saunders  YOB: 1965  MRN: [id number]  Assessment Date: 10/5/2023    CLINICAL NUTRITION ASSESSMENT    Dx:  GE junction Ca      Type of Cancer Treatment Plan undetermined at this time          Reason for Assessment  MST score 2+, Physician consult         Current Problems:   Patient Active Problem List   Diagnosis Code    Dysphagia R13.10    Gastroesophageal reflux disease K21.9    Regurgitation of food R11.10    Tobacco abuse Z72.0    Screening for malignant neoplasm of colon Z12.11    Intractable nausea and vomiting R11.2    Esophageal stricture K22.2    Malignant neoplasm of lower third of esophagus C15.5    Acute HFrEF (heart failure with reduced ejection fraction) I50.21    Atrial flutter with rapid ventricular response I48.92    Bipolar 1 disorder F31.9    Coronary artery disease involving native coronary artery I25.10    Moderate mitral regurgitation I34.0    NICM (nonischemic cardiomyopathy) I42.8    Wide-complex tachycardia R00.0    High blood pressure I10    History of heart artery stent Z95.5    Poor venous access I87.8         Anthropometrics       Row Name 10/05/23 1545          Anthropometrics    Weight for Calculation 85.3 kg (188 lb 0.8 oz)                         Weight Hx  Wt Readings from Last 30 Encounters:   10/04/23 1019 85.3 kg (188 lb)   10/03/23 1323 90.3 kg (199 lb 1.2 oz)   09/29/23 0938 90.3 kg (199 lb 1.2 oz)   09/15/23 0922 93.4 kg (205 lb 14.6 oz)   09/08/23 1845 85.8 kg (189 lb 2.5 oz)   09/08/23 1206 84.6 kg (186 lb 8.2 oz)   08/02/23 0842 97.1 kg (214 lb)         Estimated/Assessed Needs - Anthropometrics       Row Name 10/05/23 1545          Anthropometrics    Weight for Calculation 85.3 kg (188 lb 0.8 oz)        Estimated/Assessed Needs    Additional Documentation Fluid Requirements (Group);KCAL/KG (Group);Protein Requirements (Group)        KCAL/KG    KCAL/KG 35 Kcal/Kg (kcal)     35 Kcal/Kg (kcal) 2985.5         Protein Requirements    Weight Used For Protein Calculations 85.3 kg (188 lb 0.8 oz)     Est Protein Requirement Amount (gms/kg) 1.5 gm protein     Estimated Protein Requirements (gms/day) 127.95        Fluid Requirements    Fluid Requirements (mL/day) 2986     RDA Method (mL) 2986                      Labs/Medications        Pertinent Labs Reviewed.       Pertinent Medications QUEtiapine, albuterol, apixaban, atorvastatin, carvedilol, lisinopril, metFORMIN, ondansetron, ondansetron ODT, oxyCODONE-acetaminophen, pantoprazole, and sucralfate     Physical Findings        Malnutrition Severity Assessment       Row Name 10/05/23 1546                       Unintentional Weight Loss     Unintentional Weight Loss Findings Severe  12% wt decline in 1 month (August to September 2023); gained 16# until first of September; 8.3% wt decline in 3 weeks (since 1st week of September)                      Current Nutrition Orders & Evaluation of Intake       Oral Nutrition     Current PO Diet Eats a variety of foods & food consistencies (see explanation below), but wife tries to prepare very soft and moist foods   Supplement Has tried various types of ONS     Nutrition Diagnosis        Nutrition Dx Problem 1 Unintentional weight loss related to decreased ability to consume sufficient energy as evidenced by physiological causes increasing nutrient needs., hypermetabolic state., inadequate energy intake., patient report., family report., and esophageal dysphagia resulting in persistent emesis episodes.       Nutrition Intervention       RD Action Nutrition assessment (review of medical record + pt / S.O. / family interview)    Discussed:  Current nutrition-related issues (esophageal dysphagia / vomiting)  Recent wt changes (8.3% loss X 3 weeks- significant)  Current diet / PO intake / ONS intake  Soft / moist food ideas  Tips for maximizing pt's nutritional intake (calories / proteins)  MNT:  gastric irritants /  vomiting    Provided:  Handout on Soft & Moist High Protein Menu Items, Homemade Milkshake Recipes  Samples of Ensure Complete, Ensure coupons  Sample of Gelatein (high kcal, high protein gelatin)      Monitor/Evaluation       Monitor Per oncology nutrition protocol.     Comments:    Pt in radiation oncology for CT simulation.  Able to speak with pt & family today.  Wife reports above ongoing esophageal dysphagia that is not triggered by any consistent factor (food consistency, liquids, flavors- such as chocolate).  Wife reports pt can eat [employer] at times and does not experience dysphagia, but then other days he experiences dysphagia & vomiting with just water.  Family, pt and spouse report he tries to eat and tries to consume high kcal, high protein foods, but experiences emesis frequently.  Wife tries to add gravies or sauces to his foods, but does not seem to ease his symptoms.  Wife has purchased a variety of ONS to try- pt does not find appealing.  She has made pt homemade milkshakes as well and he likes these better, but often experiences emesis after consumption.  Discussed common gastric irritants and these are not always foods that trigger vomiting.  Pt can tolerate chocolate or chocolate flavors some days and other days cannot.  Pt to see Radiation Oncologist today.  Provided pt with above handout, samples of Ensure Complete, and a sample of Gelatein.  Family discussed potential for feeding tube placed by thoracic surgeon at , but not in the plan at this time.      Recommendations:  Small, frequent meals of high kcal, high protein soft / moist foods.  Homemade milkshakes or high kcal / high protein ONS TID.  Adequate fluids / oral rehydration solutions.  PEG / PEJ placement if pt continues to experience wt decline.      Electronically signed by:  Shazia Angel RD  10/05/23 15:48 EDT

## 2023-10-06 ENCOUNTER — TELEPHONE (OUTPATIENT)
Dept: SURGERY | Facility: CLINIC | Age: 58
End: 2023-10-06
Payer: MEDICARE

## 2023-10-06 NOTE — TELEPHONE ENCOUNTER
SABRINA WITH [facility] CALLED. SHE SAID THAT THE INSURANCE COMPANY HAS THE WRONG BIRTHDAY FOR THIS PATIENT. SHE ASKED THAT WE TELL HIM TO CALL HIS INSURANCE COMPANY AND TRY TO STRAIGHTEN THIS OUT.

## 2023-10-06 NOTE — TELEPHONE ENCOUNTER
I have attempted to call the patient. One phone number listed for him is out of service. He did not answer my call on the other phone# listed for him and that voicemail box was full.

## 2023-10-08 DIAGNOSIS — C15.5 MALIGNANT NEOPLASM OF LOWER THIRD OF ESOPHAGUS: ICD-10-CM

## 2023-10-09 PROBLEM — C15.9 ESOPHAGEAL CANCER: Status: ACTIVE | Noted: 2023-10-04

## 2023-10-09 RX ORDER — OXYCODONE HYDROCHLORIDE AND ACETAMINOPHEN 5; 325 MG/1; MG/1
1 TABLET ORAL EVERY 6 HOURS PRN
Qty: 90 TABLET | Refills: 0 | Status: SHIPPED | OUTPATIENT
Start: 2023-10-09

## 2023-10-09 NOTE — TELEPHONE ENCOUNTER
Attempted to call the patient again. I was able to leave a detailed voicemail with the information below on the 2nd number listed for the patient. Request a call back.

## 2023-10-10 ENCOUNTER — HOSPITAL ENCOUNTER (OUTPATIENT)
Dept: RADIATION ONCOLOGY | Facility: HOSPITAL | Age: 58
Discharge: HOME | End: 2023-10-10
Payer: MEDICARE

## 2023-10-11 ENCOUNTER — OFFICE VISIT (OUTPATIENT)
Dept: ONCOLOGY | Facility: HOSPITAL | Age: 58
End: 2023-10-11
Payer: MEDICARE

## 2023-10-11 ENCOUNTER — ANESTHESIA EVENT (OUTPATIENT)
Dept: PERIOP | Facility: HOSPITAL | Age: 58
End: 2023-10-11
Payer: MEDICARE

## 2023-10-11 VITALS
BODY MASS INDEX: 27.24 KG/M2 | WEIGHT: 190.26 LBS | SYSTOLIC BLOOD PRESSURE: 147 MMHG | RESPIRATION RATE: 16 BRPM | TEMPERATURE: 97.5 F | HEIGHT: 70 IN | HEART RATE: 67 BPM | OXYGEN SATURATION: 98 % | DIASTOLIC BLOOD PRESSURE: 88 MMHG

## 2023-10-11 DIAGNOSIS — Z71.9 ENCOUNTER FOR EDUCATION: ICD-10-CM

## 2023-10-11 DIAGNOSIS — C15.5 MALIGNANT NEOPLASM OF LOWER THIRD OF ESOPHAGUS: Primary | ICD-10-CM

## 2023-10-11 PROCEDURE — G0463 HOSPITAL OUTPT CLINIC VISIT: HCPCS | Performed by: NURSE PRACTITIONER

## 2023-10-11 NOTE — PROGRESS NOTES
CHEMOTHERAPY PREPARATION    Shashank Saunders  [id number]  1965    Chief Complaint:   Chemo Education    History of present illness:  Shashank Saunders is a 58 y.o. year old male who is here today for chemotherapy preparation and needs assessment.  The patient has been diagnosed with lower 1/3 esophageal cancer  and is scheduled to begin treatment with Carboplatin / Paclitaxel every 7 days x 5 cycles + concurrent radiation.     Oncology History:    Oncology/Hematology History Overview Note   9/8/23: Presented to [facility] ED with inability to tolerate solids and 40 lb weight loss. CT Chest showed CT scan of the chest with IV contrast demonstrating moderately dilated fluid-filled esophagus. Emphysema. Part solid opacity 6 mm in the right upper lobe.     9/9/23: EGD performed showing an ulcerated and irregular severe stenosis in the lower third of the esophagus, that was dilated and biopsied. GEJ biopsy showing poorly differentiated squamous cell carcinoma.      Malignant neoplasm of lower third of esophagus   9/14/2023 Initial Diagnosis    Malignant neoplasm of lower third of esophagus     10/3/2023 -  Chemotherapy    OP GE PACLitaxel / CARBOplatin (weekly) + XRT     10/5/2023 Cancer Staged    Staging form: Esophagus - Squamous Cell Carcinoma, AJCC 8th Edition  - Clinical: Stage II (cT2, cN0, cM0) - Signed by Winston Johnson MD on 10/5/2023     10/5/2023 -  Radiation    RADIATION THERAPY Treatment Details (Noted on 10/5/2023)  Site: Esophagus - Lower  Technique: IMRT  Goal: No goal specified  Planned Treatment Start Date: No planned start date specified         The current medication list and allergy list were reviewed and reconciled.     Past Medical History, Past Surgical History, Social History, Family History have been reviewed and are without significant changes except as mentioned.    Review of Systems:    Review of Systems   Constitutional:  Positive for fatigue.   All other systems reviewed and are  negative.      Physical Exam:    Physical Exam     General: well appearing, in no acute distress  Cardiovascular: regular rate and rhythm, no murmurs noted  Lungs: clear to auscultation bilaterally, respirations even and unlabored  Extremities: no lower extremity edema  Skin: no rashes, lesions, bruising, or petechiae  Psych: Mood is stable    Vitals:    10/11/23 1516   BP: 147/88   Pulse: 67   Resp: 16   Temp: 97.5 øF (36.4 øC)   SpO2: 98%     Vitals:    10/11/23 1516   PainSc: 0-No pain          ECOG: {findings; ecog performance status:53521            NEEDS ASSESSMENTS    VAD Assessment  The patient and I discussed planned intravenous chemotherapy as well as other IV treatments that are often needed throughout the course of treatment. These may include, but are not limited to blood transfusions, antibiotics, and IV hydration. The vasculature does not appear to be adequate for multiple peripheral IVs throughout their treatment course. Discussed risks and benefits of VADs. The patient would like to pursue Port-A-Cath insertion prior to initiation of treatment.       Palliative Care  The patient and I discussed palliative care services. Palliative care is not the same as Hospice care. This is specialized medical care for people living with serious illness with the goal of improving quality of life for the patient and their family. [facility] has partnered with hospitals to offer our patients outpatient palliative care early along with their treatment to assist in coordination of care, symptom management, pain management, and medical decision making.  Oncology criteria for palliative care referral is not met at this time. The patient is interested in a palliative care consultation.     Additional Referral needs  none      CHEMOTHERAPY EDUCATION    Booklets Given: Chemotherapy and You [x]  Eating Hints [x]    Sexuality/Fertility Books []      Chemotherapy/Biotherapy Education Sheets: (list all that apply)  nausea  management, acid reflux management, diarrhea management, Cancer resourse contacts information, skin and mouth care, and vaccination information                                                                                                                                                                 Chemotherapy Regimen:   Treatment Plans       Name Type Plan Dates Plan Provider         Active    OP GE PACLitaxel / CARBOplatin (weekly) + XRT ONCOLOGY TREATMENT  10/2/2023 - Present Winston Johnson MD                      TOPICS EDUCATION PROVIDED COMMENTS   ANEMIA:  role of RBC, cause, s/s, ways to manage, role of transfusion [x]    THROMBOCYTOPENIA:  role of platelet, cause, s/s, ways to prevent bleeding, things to avoid, when to seek help [x]    NEUTROPENIA:  role of WBC, cause, infection precautions, s/s of infection, when to call MD [x]    NUTRITION & APPETITE CHANGES:  importance of maintaining healthy diet & weight, ways to manage to improve intake, dietary consult, exercise regimen [x]    DIARRHEA:  causes, s/s of dehydration, ways to manage, dietary changes, when to call MD [x]    CONSTIPATION:  causes, ways to manage, dietary changes, when to call MD [x]    NAUSEA & VOMITING:  cause, use of antiemetics, dietary changes, when to call MD [x]    MOUTH SORES:  causes, oral care, ways to manage [x]    ALOPECIA:  cause, ways to manage, resources [x]    INFERTILITY & SEXUALITY:  causes, fertility preservation options, sexuality changes, ways to manage, importance of birth control [x]    NERVOUS SYSTEM CHANGES:  causes, s/s, neuropathies, cognitive changes, ways to manage [x]    PAIN:  causes, ways to manage [x]    SKIN & NAIL CHANGES:  cause, s/s, ways to manage [x]    ORGAN TOXICITIES:  cause, s/s, need for diagnostic tests, labs, when to notify MD [x]    SURVIVORSHIP:  distress, distress assessment, secondary malignancies, early/late effects, follow-up, social issues, social support [x]    HOME CARE:   use of spill kits, storing of PO chemo, how to manage bodily fluids [x]    MISCELLANEOUS:  drug interactions, administration, vesicant, et [x]        Assessment and Plan:    Diagnoses and all orders for this visit:    1. Malignant neoplasm of lower third of esophagus [C15.5] (Primary)    2. Encounter for education    Start date of 10/16/2023 at 10:15AM arrive at 10A.   For port placement on 10/12/2023.       The patient and I have reviewed their cancer diagnosis and scheduled treatment plan. Needs assessment was completed including genetics, psychosocial needs, barriers to care, VAD evaluation, advanced care planning, and palliative care services. Referrals have been ordered as appropriate based upon our evaluation and patient desires.     Chemotherapy teaching was also completed today as documented above. Adequate time was given to answer all questions to his satisfaction. Patient and family are aware of their care team members and contact information if they have questions or problems throughout the treatment course. The patient is adequately prepared to begin treatment as scheduled.     Reviewed with patient education regarding EMLA cream, dexamethasone and Zofran prescriptions sent to pharmacy.       I advised the patient that he can take Tylenol  as needed for aches/pains related to cancer/treatment.  I also advised that his can use Senokot or MiraLAX as needed for constipation or Imodium as needed for diarrhea.       I reviewed with the patient the care team members. I also reviewed the option of the urgent care clinic through our oncology office for evaluation and management of symptoms related to treatment.    I spent 40 minutes caring for Shashank on this date of service. This time includes time spent by me in the following activities: preparing for the visit, reviewing tests, counseling and educating the patient/family/caregiver, referring and communicating with other health care professionals, documenting  information in the medical record, and independently interpreting results and communicating that information with the patient/family/caregiver.     Jenni Mensah, APRN

## 2023-10-12 ENCOUNTER — HOSPITAL ENCOUNTER (OUTPATIENT)
Facility: HOSPITAL | Age: 58
Setting detail: HOSPITAL OUTPATIENT SURGERY
Discharge: HOME | End: 2023-10-12
Attending: STUDENT IN AN ORGANIZED HEALTH CARE EDUCATION/TRAINING PROGRAM | Admitting: STUDENT IN AN ORGANIZED HEALTH CARE EDUCATION/TRAINING PROGRAM
Payer: MEDICARE

## 2023-10-12 ENCOUNTER — APPOINTMENT (OUTPATIENT)
Dept: GENERAL RADIOLOGY | Facility: HOSPITAL | Age: 58
End: 2023-10-12
Payer: MEDICARE

## 2023-10-12 ENCOUNTER — ANESTHESIA (OUTPATIENT)
Dept: PERIOP | Facility: HOSPITAL | Age: 58
End: 2023-10-12
Payer: MEDICARE

## 2023-10-12 VITALS
TEMPERATURE: 98 F | WEIGHT: 190.92 LBS | HEART RATE: 76 BPM | BODY MASS INDEX: 27.33 KG/M2 | RESPIRATION RATE: 18 BRPM | HEIGHT: 70 IN | DIASTOLIC BLOOD PRESSURE: 70 MMHG | OXYGEN SATURATION: 98 % | SYSTOLIC BLOOD PRESSURE: 120 MMHG

## 2023-10-12 DIAGNOSIS — C15.9 ESOPHAGEAL CANCER: ICD-10-CM

## 2023-10-12 DIAGNOSIS — I87.8 POOR VENOUS ACCESS: ICD-10-CM

## 2023-10-12 PROCEDURE — 77001 FLUOROGUIDE FOR VEIN DEVICE: CPT | Performed by: STUDENT IN AN ORGANIZED HEALTH CARE EDUCATION/TRAINING PROGRAM

## 2023-10-12 PROCEDURE — 25010000002 CEFAZOLIN IN DEXTROSE 2000 MG/ 100 ML SOLUTION: Performed by: NURSE PRACTITIONER

## 2023-10-12 PROCEDURE — 25810000003 LACTATED RINGERS PER 1000 ML: Performed by: ANESTHESIOLOGY

## 2023-10-12 PROCEDURE — 25010000002 HEPARIN (PORCINE) PER 1000 UNITS: Performed by: STUDENT IN AN ORGANIZED HEALTH CARE EDUCATION/TRAINING PROGRAM

## 2023-10-12 PROCEDURE — 76000 FLUOROSCOPY <1 HR PHYS/QHP: CPT

## 2023-10-12 PROCEDURE — 36561 INSERT TUNNELED CV CATH: CPT | Performed by: STUDENT IN AN ORGANIZED HEALTH CARE EDUCATION/TRAINING PROGRAM

## 2023-10-12 PROCEDURE — C1788 PORT, INDWELLING, IMP: HCPCS | Performed by: STUDENT IN AN ORGANIZED HEALTH CARE EDUCATION/TRAINING PROGRAM

## 2023-10-12 PROCEDURE — 25010000002 FENTANYL CITRATE (PF) 50 MCG/ML SOLUTION: Performed by: NURSE ANESTHETIST, CERTIFIED REGISTERED

## 2023-10-12 PROCEDURE — 25010000002 MIDAZOLAM PER 1MG: Performed by: ANESTHESIOLOGY

## 2023-10-12 PROCEDURE — 25010000002 PROPOFOL 10 MG/ML EMULSION: Performed by: NURSE ANESTHETIST, CERTIFIED REGISTERED

## 2023-10-12 DEVICE — PRT INTRO VASC/INTERV VORTEX FILL/HL DETACH/POLYURET/CATH 8F: Type: IMPLANTABLE DEVICE | Site: CHEST | Status: FUNCTIONAL

## 2023-10-12 RX ORDER — FENTANYL CITRATE 50 UG/ML
INJECTION, SOLUTION INTRAMUSCULAR; INTRAVENOUS AS NEEDED
Status: DISCONTINUED | OUTPATIENT
Start: 2023-10-12 | End: 2023-10-12 | Stop reason: SURG

## 2023-10-12 RX ORDER — ONDANSETRON 4 MG/1
4 TABLET, FILM COATED ORAL ONCE AS NEEDED
Status: DISCONTINUED | OUTPATIENT
Start: 2023-10-12 | End: 2023-10-13 | Stop reason: HOSPADM

## 2023-10-12 RX ORDER — PHENYLEPHRINE HCL IN 0.9% NACL 1 MG/10 ML
SYRINGE (ML) INTRAVENOUS AS NEEDED
Status: DISCONTINUED | OUTPATIENT
Start: 2023-10-12 | End: 2023-10-12 | Stop reason: SURG

## 2023-10-12 RX ORDER — PROMETHAZINE HYDROCHLORIDE 25 MG/1
25 SUPPOSITORY RECTAL ONCE AS NEEDED
Status: DISCONTINUED | OUTPATIENT
Start: 2023-10-12 | End: 2023-10-12 | Stop reason: HOSPADM

## 2023-10-12 RX ORDER — LIDOCAINE HYDROCHLORIDE AND EPINEPHRINE 10; 10 MG/ML; UG/ML
INJECTION, SOLUTION INFILTRATION; PERINEURAL AS NEEDED
Status: DISCONTINUED | OUTPATIENT
Start: 2023-10-12 | End: 2023-10-12 | Stop reason: HOSPADM

## 2023-10-12 RX ORDER — SODIUM CHLORIDE, SODIUM LACTATE, POTASSIUM CHLORIDE, CALCIUM CHLORIDE 600; 310; 30; 20 MG/100ML; MG/100ML; MG/100ML; MG/100ML
100 INJECTION, SOLUTION INTRAVENOUS CONTINUOUS
Status: DISCONTINUED | OUTPATIENT
Start: 2023-10-12 | End: 2023-10-13 | Stop reason: HOSPADM

## 2023-10-12 RX ORDER — MIDAZOLAM HYDROCHLORIDE 2 MG/2ML
2 INJECTION, SOLUTION INTRAMUSCULAR; INTRAVENOUS ONCE
Status: COMPLETED | OUTPATIENT
Start: 2023-10-12 | End: 2023-10-12

## 2023-10-12 RX ORDER — SODIUM CHLORIDE, SODIUM LACTATE, POTASSIUM CHLORIDE, CALCIUM CHLORIDE 600; 310; 30; 20 MG/100ML; MG/100ML; MG/100ML; MG/100ML
9 INJECTION, SOLUTION INTRAVENOUS CONTINUOUS PRN
Status: DISCONTINUED | OUTPATIENT
Start: 2023-10-12 | End: 2023-10-12 | Stop reason: HOSPADM

## 2023-10-12 RX ORDER — PROMETHAZINE HYDROCHLORIDE 12.5 MG/1
25 TABLET ORAL ONCE AS NEEDED
Status: DISCONTINUED | OUTPATIENT
Start: 2023-10-12 | End: 2023-10-12 | Stop reason: HOSPADM

## 2023-10-12 RX ORDER — EPHEDRINE SULFATE 50 MG/ML
INJECTION, SOLUTION INTRAVENOUS AS NEEDED
Status: DISCONTINUED | OUTPATIENT
Start: 2023-10-12 | End: 2023-10-12 | Stop reason: SURG

## 2023-10-12 RX ORDER — CEFAZOLIN SODIUM 2 G/100ML
2000 INJECTION, SOLUTION INTRAVENOUS ONCE
Status: COMPLETED | OUTPATIENT
Start: 2023-10-12 | End: 2023-10-12

## 2023-10-12 RX ORDER — SODIUM CHLORIDE 0.9 % (FLUSH) 0.9 %
10 SYRINGE (ML) INJECTION AS NEEDED
Status: DISCONTINUED | OUTPATIENT
Start: 2023-10-12 | End: 2023-10-12 | Stop reason: HOSPADM

## 2023-10-12 RX ORDER — ONDANSETRON 2 MG/ML
4 INJECTION INTRAMUSCULAR; INTRAVENOUS ONCE AS NEEDED
Status: DISCONTINUED | OUTPATIENT
Start: 2023-10-12 | End: 2023-10-12 | Stop reason: HOSPADM

## 2023-10-12 RX ORDER — OXYCODONE HYDROCHLORIDE 5 MG/1
5 TABLET ORAL
Status: DISCONTINUED | OUTPATIENT
Start: 2023-10-12 | End: 2023-10-12 | Stop reason: HOSPADM

## 2023-10-12 RX ORDER — ACETAMINOPHEN 500 MG
1000 TABLET ORAL ONCE
Status: COMPLETED | OUTPATIENT
Start: 2023-10-12 | End: 2023-10-12

## 2023-10-12 RX ORDER — PROPOFOL 10 MG/ML
VIAL (ML) INTRAVENOUS AS NEEDED
Status: DISCONTINUED | OUTPATIENT
Start: 2023-10-12 | End: 2023-10-12 | Stop reason: SURG

## 2023-10-12 RX ORDER — LIDOCAINE HYDROCHLORIDE 20 MG/ML
INJECTION, SOLUTION EPIDURAL; INFILTRATION; INTRACAUDAL; PERINEURAL AS NEEDED
Status: DISCONTINUED | OUTPATIENT
Start: 2023-10-12 | End: 2023-10-12 | Stop reason: SURG

## 2023-10-12 RX ORDER — SODIUM CHLORIDE 9 MG/ML
40 INJECTION, SOLUTION INTRAVENOUS AS NEEDED
Status: DISCONTINUED | OUTPATIENT
Start: 2023-10-12 | End: 2023-10-12 | Stop reason: HOSPADM

## 2023-10-12 RX ORDER — SODIUM CHLORIDE 0.9 % (FLUSH) 0.9 %
10 SYRINGE (ML) INJECTION EVERY 12 HOURS SCHEDULED
Status: DISCONTINUED | OUTPATIENT
Start: 2023-10-12 | End: 2023-10-12 | Stop reason: HOSPADM

## 2023-10-12 RX ADMIN — Medication 200 MCG: at 18:58

## 2023-10-12 RX ADMIN — LIDOCAINE HYDROCHLORIDE 100 MG: 20 INJECTION, SOLUTION EPIDURAL; INFILTRATION; INTRACAUDAL; PERINEURAL at 18:42

## 2023-10-12 RX ADMIN — ACETAMINOPHEN 1000 MG: 500 TABLET ORAL at 13:17

## 2023-10-12 RX ADMIN — Medication 200 MCG: at 18:46

## 2023-10-12 RX ADMIN — CEFAZOLIN SODIUM 2000 MG: 2 INJECTION, SOLUTION INTRAVENOUS at 18:35

## 2023-10-12 RX ADMIN — FENTANYL CITRATE 25 MCG: 50 INJECTION, SOLUTION INTRAMUSCULAR; INTRAVENOUS at 18:52

## 2023-10-12 RX ADMIN — EPHEDRINE SULFATE 5 MG: 50 INJECTION INTRAVENOUS at 19:00

## 2023-10-12 RX ADMIN — PROPOFOL 20 MG: 10 INJECTION, EMULSION INTRAVENOUS at 18:46

## 2023-10-12 RX ADMIN — SODIUM CHLORIDE, POTASSIUM CHLORIDE, SODIUM LACTATE AND CALCIUM CHLORIDE 9 ML/HR: 600; 310; 30; 20 INJECTION, SOLUTION INTRAVENOUS at 13:17

## 2023-10-12 RX ADMIN — Medication 100 MCG: at 18:50

## 2023-10-12 RX ADMIN — PROPOFOL 50 MG: 10 INJECTION, EMULSION INTRAVENOUS at 18:43

## 2023-10-12 RX ADMIN — MIDAZOLAM HYDROCHLORIDE 2 MG: 1 INJECTION, SOLUTION INTRAMUSCULAR; INTRAVENOUS at 18:14

## 2023-10-12 RX ADMIN — Medication 100 MCG: at 19:11

## 2023-10-12 RX ADMIN — FENTANYL CITRATE 25 MCG: 50 INJECTION, SOLUTION INTRAMUSCULAR; INTRAVENOUS at 18:51

## 2023-10-12 RX ADMIN — PROPOFOL 50 MG: 10 INJECTION, EMULSION INTRAVENOUS at 18:45

## 2023-10-12 NOTE — ANESTHESIA PREPROCEDURE EVALUATION
Anesthesia Evaluation     Patient summary reviewed and Nursing notes reviewed   no history of anesthetic complications:   NPO Solid Status: > 8 hours  NPO Liquid Status: > 2 hours           Airway   Mallampati: III  TM distance: >3 FB  Neck ROM: full  No difficulty expected  Dental    (+) poor dentition and upper dentures        Pulmonary - normal exam    breath sounds clear to auscultation  (+) a smoker Current, asthma,  Cardiovascular   Exercise tolerance: poor (<4 METS)    PT is on anticoagulation therapy  Patient on routine beta blocker and Beta blocker not taken-may be given intraoperatively  Rhythm: irregular  Rate: normal    (+) hypertension, valvular problems/murmurs MR, past MI  >12 months, CAD, cardiac stents more than 12 months ago , dysrhythmias Atrial Flutter, CHF Systolic <55%      Neuro/Psych  (+) psychiatric history Bipolar  GI/Hepatic/Renal/Endo    (+) GERD    Musculoskeletal (-) negative ROS    Abdominal    Substance History - negative use     OB/GYN negative ob/gyn ROS         Other      history of cancer (Esophageal)    ROS/Med Hx Other: PAT Nursing Notes unavailable.                 Anesthesia Plan    ASA 4     general and MAC     (Patient understands anesthesia not responsible for dental damage.)  intravenous induction     Anesthetic plan, risks, benefits, and alternatives have been provided, discussed and informed consent has been obtained with: patient.    Use of blood products discussed with patient .    Plan discussed with CRNA.    CODE STATUS:

## 2023-10-12 NOTE — OP NOTE
INSERTION VENOUS ACCESS DEVICE  Procedure Report    Patient Name:  Shashank Saunders  YOB: 1965    Date of Surgery:  10/12/2023     Indications:  GE junction squamous cell carcinoma    Pre-op Diagnosis:   Esophageal cancer [C15.9]  Poor venous access [I87.8]       Post-Op Diagnosis Codes:     * Esophageal cancer [C15.9]     * Poor venous access [I87.8]    Procedure/CPT® Codes:      Procedure(s):  INSERTION VENOUS ACCESS DEVICE        Staff:  Surgeon(s):  Yung Montejo MD         Anesthesia: Monitored Anesthesia Care    Estimated Blood Loss: minimal    Implants:    Implant Name Type Inv. Item Serial No.  Lot No. LRB No. Used Action   PRT INTRO VASC/INTERV VORTEX FILL/HL DETACH/POLYURET/CATH 8F - [id number] Implant PRT INTRO VASC/INTERV VORTEX FILL/HL DETACH/POLYURET/CATH 8F  [employer] [id number] Left 1 Implanted       Specimen:          None        Findings: No acute findings    Complications: None apparent at the time of surgery    Description of Procedure: Informed consent was obtained for the patient was brought the operating suite and placed in the supine position. MAC anesthesia was induced and administered throughout the entire procedure.  Patient's neck and upper chest were prepped and draped in standard sterile fashion for a timeout procedure was performed, verifying the correct patient, procedure, and other pertinent information.    Local anesthetic was administered along the angle of the clavicle on the left side to the periosteum.  An introducer needle was then inserted into the left subclavian vein using landmark technique, confirmed by flash of dark venous blood.  Guidewire was then easily inserted into the needle before the needle was removed.  Fluoroscopy confirmed satisfactory position of the guidewire in the SVC.  Local anesthetic was then used along the upper chest approximately 2 fingerbreadths below the venous access site.  Using a #15 blade scalpel a 3 cm incision was  made over top this area and blunt dissection used to create a pocket for a single-lumen port.  Tunneling device was used to connect the catheter from the subcutaneous pocket to the venous access site.  Dilator and sheath were then easily passed over top the guidewire before the dilator and guidewire were removed.  Catheter was fed into the sheath and placed in appropriate position in the SVC under fluoroscopic guidance.  Excess catheter at the subcutaneous pocket site was trimmed to an appropriate level before the locking and single-lumen port were secured on the end.  Port was secured to the chest wall using 3-0 Vicryl x2.  A Lane needle was inserted into the port and there was noted to easily flush and draw.  Port was placed within the subcutaneous pocket.  Hemostasis was verified.  Final fluoroscopic images confirmed satisfactory position of the tip of the catheter in the SVC and no tortuosity.  Deep dermal layer was reapproximated using interrupted 3-0 Vicryl at the pocket site.  Skin at both sites was closed with subcuticular 4-0 Monocryl.  The incisions were cleaned and dried for application of Exofin over top to conclude the procedure.    Patient tolerated the procedure well and there were no immediate complications.  All counts were correct x2 at the end of the procedure.    I was present throughout the entirety of the procedure.    Disposition: Stable in PACU, awaiting chest x-ray    Electronically signed by Yung Montejo MD, 10/12/23, 7:22 PM EDT.

## 2023-10-13 PROBLEM — Z45.2 ENCOUNTER FOR ADJUSTMENT OR MANAGEMENT OF VASCULAR ACCESS DEVICE: Status: ACTIVE | Noted: 2023-10-13

## 2023-10-13 NOTE — ANESTHESIA POSTPROCEDURE EVALUATION
Patient: Shashank Saunders    Procedure Summary       Date: 10/12/23 Room / Location: [facility] OR 01 / [facility] MAIN OR    Anesthesia Start: 1834 Anesthesia Stop: 1925    Procedure: INSERTION VENOUS ACCESS DEVICE (Left) Diagnosis:       Esophageal cancer      Poor venous access      (Esophageal cancer [C15.9])      (Poor venous access [I87.8])    Surgeons: Yung Montejo MD Provider: Julio Altman MD    Anesthesia Type: general, MAC ASA Status: 4            Anesthesia Type: general, MAC    Vitals  Vitals Value Taken Time   /74 10/12/23 2100   Temp 36.2 øC (97.1 øF) 10/12/23 1925   Pulse 72 10/12/23 2115   Resp 15 10/12/23 2115   SpO2 95 % 10/12/23 2115           Post Anesthesia Care and Evaluation    Patient location during evaluation: bedside  Patient participation: complete - patient participated  Level of consciousness: awake  Pain management: adequate    Airway patency: patent  PONV Status: none  Cardiovascular status: acceptable and stable  Respiratory status: acceptable  Hydration status: acceptable    Comments: An Anesthesiologist personally participated in the most demanding procedures (including induction and emergence if applicable) in the anesthesia plan, monitored the course of anesthesia administration at frequent intervals and remained physically present and available for immediate diagnosis and treatment of emergencies.

## 2023-10-16 ENCOUNTER — HOSPITAL ENCOUNTER (OUTPATIENT)
Dept: ONCOLOGY | Facility: HOSPITAL | Age: 58
Discharge: HOME | End: 2023-10-16
Admitting: INTERNAL MEDICINE
Payer: MEDICARE

## 2023-10-16 ENCOUNTER — DOCUMENTATION (OUTPATIENT)
Dept: ONCOLOGY | Facility: HOSPITAL | Age: 58
End: 2023-10-16
Payer: MEDICARE

## 2023-10-16 ENCOUNTER — HOSPITAL ENCOUNTER (OUTPATIENT)
Dept: RADIATION ONCOLOGY | Facility: HOSPITAL | Age: 58
Discharge: HOME | End: 2023-10-16
Payer: MEDICARE

## 2023-10-16 VITALS
OXYGEN SATURATION: 97 % | BODY MASS INDEX: 29.76 KG/M2 | HEART RATE: 66 BPM | HEIGHT: 67 IN | RESPIRATION RATE: 20 BRPM | TEMPERATURE: 97.3 F | WEIGHT: 189.6 LBS | SYSTOLIC BLOOD PRESSURE: 135 MMHG | DIASTOLIC BLOOD PRESSURE: 89 MMHG

## 2023-10-16 DIAGNOSIS — Z45.2 ENCOUNTER FOR ADJUSTMENT OR MANAGEMENT OF VASCULAR ACCESS DEVICE: ICD-10-CM

## 2023-10-16 DIAGNOSIS — C15.5 MALIGNANT NEOPLASM OF LOWER THIRD OF ESOPHAGUS: Primary | ICD-10-CM

## 2023-10-16 LAB
ALBUMIN SERPL-MCNC: 3.9 G/DL (ref 3.5–5.2)
ALBUMIN/GLOB SERPL: 1.7 G/DL
ALP SERPL-CCNC: 65 U/L (ref 39–117)
ALT SERPL W P-5'-P-CCNC: 11 U/L (ref 1–41)
ANION GAP SERPL CALCULATED.3IONS-SCNC: 3 MMOL/L (ref 5–15)
AST SERPL-CCNC: 20 U/L (ref 1–40)
BASOPHILS # BLD AUTO: 0.03 10*3/MM3 (ref 0–0.2)
BASOPHILS NFR BLD AUTO: 0.3 % (ref 0–1.5)
BILIRUB SERPL-MCNC: 0.6 MG/DL (ref 0–1.2)
BUN SERPL-MCNC: 24 MG/DL (ref 6–20)
BUN/CREAT SERPL: 26.4 (ref 7–25)
CALCIUM SPEC-SCNC: 9.1 MG/DL (ref 8.6–10.5)
CHLORIDE SERPL-SCNC: 104 MMOL/L (ref 98–107)
CO2 SERPL-SCNC: 32 MMOL/L (ref 22–29)
CREAT SERPL-MCNC: 0.91 MG/DL (ref 0.76–1.27)
DEPRECATED RDW RBC AUTO: 51.4 FL (ref 37–54)
EGFRCR SERPLBLD CKD-EPI 2021: 97.1 ML/MIN/1.73
EOSINOPHIL # BLD AUTO: 0.2 10*3/MM3 (ref 0–0.4)
EOSINOPHIL NFR BLD AUTO: 1.7 % (ref 0.3–6.2)
ERYTHROCYTE [DISTWIDTH] IN BLOOD BY AUTOMATED COUNT: 14.6 % (ref 12.3–15.4)
GLOBULIN UR ELPH-MCNC: 2.3 GM/DL
GLUCOSE SERPL-MCNC: 118 MG/DL (ref 65–99)
HCT VFR BLD AUTO: 39.7 % (ref 37.5–51)
HGB BLD-MCNC: 13.1 G/DL (ref 13–17.7)
IMM GRANULOCYTES # BLD AUTO: 0.02 10*3/MM3 (ref 0–0.05)
IMM GRANULOCYTES NFR BLD AUTO: 0.2 % (ref 0–0.5)
LYMPHOCYTES # BLD AUTO: 3.12 10*3/MM3 (ref 0.7–3.1)
LYMPHOCYTES NFR BLD AUTO: 26.4 % (ref 19.6–45.3)
MCH RBC QN AUTO: 31 PG (ref 26.6–33)
MCHC RBC AUTO-ENTMCNC: 33 G/DL (ref 31.5–35.7)
MCV RBC AUTO: 93.9 FL (ref 79–97)
MONOCYTES # BLD AUTO: 1.34 10*3/MM3 (ref 0.1–0.9)
MONOCYTES NFR BLD AUTO: 11.3 % (ref 5–12)
NEUTROPHILS NFR BLD AUTO: 60.1 % (ref 42.7–76)
NEUTROPHILS NFR BLD AUTO: 7.1 10*3/MM3 (ref 1.7–7)
PLATELET # BLD AUTO: 215 10*3/MM3 (ref 140–450)
PMV BLD AUTO: 10.6 FL (ref 6–12)
POTASSIUM SERPL-SCNC: 4.1 MMOL/L (ref 3.5–5.2)
PROT SERPL-MCNC: 6.2 G/DL (ref 6–8.5)
RAD ONC ARIA COURSE ID: NORMAL
RAD ONC ARIA COURSE INTENT: NORMAL
RAD ONC ARIA COURSE LAST TREATMENT DATE: NORMAL
RAD ONC ARIA COURSE START DATE: NORMAL
RAD ONC ARIA COURSE TREATMENT ELAPSED DAYS: 0
RAD ONC ARIA FIRST TREATMENT DATE: NORMAL
RAD ONC ARIA PLAN FRACTIONS TREATED TO DATE: 1
RAD ONC ARIA PLAN ID: NORMAL
RAD ONC ARIA PLAN PRESCRIBED DOSE PER FRACTION: 1.8 GY
RAD ONC ARIA PLAN PRIMARY REFERENCE POINT: NORMAL
RAD ONC ARIA PLAN TOTAL FRACTIONS PRESCRIBED: 25
RAD ONC ARIA PLAN TOTAL PRESCRIBED DOSE: 4500 CGY
RAD ONC ARIA REFERENCE POINT DOSAGE GIVEN TO DATE: 1.8 GY
RAD ONC ARIA REFERENCE POINT ID: NORMAL
RAD ONC ARIA REFERENCE POINT SESSION DOSAGE GIVEN: 1.8 GY
RBC # BLD AUTO: 4.23 10*6/MM3 (ref 4.14–5.8)
SODIUM SERPL-SCNC: 139 MMOL/L (ref 136–145)
WBC NRBC COR # BLD: 11.81 10*3/MM3 (ref 3.4–10.8)

## 2023-10-16 PROCEDURE — 25010000002 HEPARIN LOCK FLUSH PER 10 UNITS: Performed by: INTERNAL MEDICINE

## 2023-10-16 PROCEDURE — 80053 COMPREHEN METABOLIC PANEL: CPT | Performed by: INTERNAL MEDICINE

## 2023-10-16 PROCEDURE — 85025 COMPLETE CBC W/AUTO DIFF WBC: CPT | Performed by: INTERNAL MEDICINE

## 2023-10-16 PROCEDURE — 77300 RADIATION THERAPY DOSE PLAN: CPT | Performed by: RADIOLOGY

## 2023-10-16 PROCEDURE — 77338 DESIGN MLC DEVICE FOR IMRT: CPT | Performed by: RADIOLOGY

## 2023-10-16 PROCEDURE — 77386: CPT | Performed by: RADIOLOGY

## 2023-10-16 PROCEDURE — 96417 CHEMO IV INFUS EACH ADDL SEQ: CPT

## 2023-10-16 PROCEDURE — 96413 CHEMO IV INFUSION 1 HR: CPT

## 2023-10-16 PROCEDURE — 25010000002 PACLITAXEL PER 1 MG: Performed by: INTERNAL MEDICINE

## 2023-10-16 PROCEDURE — 25010000002 PALONOSETRON PER 25 MCG: Performed by: INTERNAL MEDICINE

## 2023-10-16 PROCEDURE — 96375 TX/PRO/DX INJ NEW DRUG ADDON: CPT

## 2023-10-16 PROCEDURE — 77301 RADIOTHERAPY DOSE PLAN IMRT: CPT | Performed by: RADIOLOGY

## 2023-10-16 PROCEDURE — 25810000003 SODIUM CHLORIDE 0.9 % SOLUTION 250 ML FLEX CONT: Performed by: INTERNAL MEDICINE

## 2023-10-16 PROCEDURE — 25810000003 SODIUM CHLORIDE 0.9 % SOLUTION: Performed by: INTERNAL MEDICINE

## 2023-10-16 PROCEDURE — 25010000002 DEXAMETHASONE SODIUM PHOSPHATE 120 MG/30ML SOLUTION: Performed by: INTERNAL MEDICINE

## 2023-10-16 PROCEDURE — 25010000002 DIPHENHYDRAMINE PER 50 MG: Performed by: INTERNAL MEDICINE

## 2023-10-16 PROCEDURE — 25010000002 CARBOPLATIN PER 50 MG: Performed by: INTERNAL MEDICINE

## 2023-10-16 RX ORDER — SODIUM CHLORIDE 0.9 % (FLUSH) 0.9 %
20 SYRINGE (ML) INJECTION AS NEEDED
Status: DISCONTINUED | OUTPATIENT
Start: 2023-10-16 | End: 2023-10-17 | Stop reason: HOSPADM

## 2023-10-16 RX ORDER — FAMOTIDINE 10 MG/ML
20 INJECTION, SOLUTION INTRAVENOUS AS NEEDED
Status: CANCELLED | OUTPATIENT
Start: 2023-10-16

## 2023-10-16 RX ORDER — FAMOTIDINE 10 MG/ML
20 INJECTION, SOLUTION INTRAVENOUS ONCE
Status: COMPLETED | OUTPATIENT
Start: 2023-10-16 | End: 2023-10-16

## 2023-10-16 RX ORDER — HEPARIN SODIUM (PORCINE) LOCK FLUSH IV SOLN 100 UNIT/ML 100 UNIT/ML
500 SOLUTION INTRAVENOUS AS NEEDED
Status: DISCONTINUED | OUTPATIENT
Start: 2023-10-16 | End: 2023-10-17 | Stop reason: HOSPADM

## 2023-10-16 RX ORDER — PALONOSETRON 0.05 MG/ML
0.25 INJECTION, SOLUTION INTRAVENOUS ONCE
Status: CANCELLED | OUTPATIENT
Start: 2023-10-16

## 2023-10-16 RX ORDER — FAMOTIDINE 10 MG/ML
20 INJECTION, SOLUTION INTRAVENOUS ONCE
Status: CANCELLED | OUTPATIENT
Start: 2023-10-16

## 2023-10-16 RX ORDER — PALONOSETRON 0.05 MG/ML
0.25 INJECTION, SOLUTION INTRAVENOUS ONCE
Status: COMPLETED | OUTPATIENT
Start: 2023-10-16 | End: 2023-10-16

## 2023-10-16 RX ORDER — HEPARIN SODIUM (PORCINE) LOCK FLUSH IV SOLN 100 UNIT/ML 100 UNIT/ML
500 SOLUTION INTRAVENOUS AS NEEDED
OUTPATIENT
Start: 2023-10-23

## 2023-10-16 RX ORDER — SODIUM CHLORIDE 0.9 % (FLUSH) 0.9 %
20 SYRINGE (ML) INJECTION AS NEEDED
OUTPATIENT
Start: 2023-10-16

## 2023-10-16 RX ORDER — SODIUM CHLORIDE 9 MG/ML
250 INJECTION, SOLUTION INTRAVENOUS ONCE
Status: COMPLETED | OUTPATIENT
Start: 2023-10-16 | End: 2023-10-16

## 2023-10-16 RX ORDER — SODIUM CHLORIDE 9 MG/ML
250 INJECTION, SOLUTION INTRAVENOUS ONCE
Status: CANCELLED | OUTPATIENT
Start: 2023-10-16

## 2023-10-16 RX ORDER — DIPHENHYDRAMINE HYDROCHLORIDE 50 MG/ML
50 INJECTION INTRAMUSCULAR; INTRAVENOUS AS NEEDED
Status: CANCELLED | OUTPATIENT
Start: 2023-10-16

## 2023-10-16 RX ADMIN — DEXAMETHASONE SODIUM PHOSPHATE 12 MG: 4 INJECTION, SOLUTION INTRA-ARTICULAR; INTRALESIONAL; INTRAMUSCULAR; INTRAVENOUS; SOFT TISSUE at 11:29

## 2023-10-16 RX ADMIN — CARBOPLATIN 260 MG: 10 INJECTION, SOLUTION INTRAVENOUS at 13:33

## 2023-10-16 RX ADMIN — PACLITAXEL 105 MG: 6 INJECTION, SOLUTION, CONCENTRATE INTRAVENOUS at 12:20

## 2023-10-16 RX ADMIN — FAMOTIDINE 20 MG: 10 INJECTION INTRAVENOUS at 11:24

## 2023-10-16 RX ADMIN — Medication 20 ML: at 14:11

## 2023-10-16 RX ADMIN — PALONOSETRON 0.25 MG: 0.05 INJECTION, SOLUTION INTRAVENOUS at 11:27

## 2023-10-16 RX ADMIN — HEPARIN SODIUM (PORCINE) LOCK FLUSH IV SOLN 100 UNIT/ML 500 UNITS: 100 SOLUTION at 14:11

## 2023-10-16 RX ADMIN — DIPHENHYDRAMINE HYDROCHLORIDE 50 MG: 50 INJECTION, SOLUTION INTRAMUSCULAR; INTRAVENOUS at 11:48

## 2023-10-16 RX ADMIN — SODIUM CHLORIDE 250 ML: 9 INJECTION, SOLUTION INTRAVENOUS at 11:20

## 2023-10-16 NOTE — PROGRESS NOTES
Outpatient Nutrition Oncology Follow Up    Patient Name: Shashank Saunders  YOB: 1965  MRN: [id number]    Recommendation(s):   Small, frequent meals of high kcal, high protein soft / moist foods.  Homemade milkshakes TID.  Adequate fluids / oral rehydration solutions.  PEG / PEJ placement if significant wt decline resumes.      Reason for Consult:  Initial week of treatment (Carboplatin, Taxol + XRT to lower esophagus)     Today's Weight:  86 kg    Weight Change:   1# wt decline in the past week; wt has fluctuated between 85-93 kg over the past month  Overall, pt has lost 16.5% body weight in 6 months (significant), however wt has stabilized during the past 1-2 weeks.    Nutrition-related concerns: Dysphagia/odynophagia    Current PO intake:  Wife prepares soft / moist foods, however pt occasionally eats regular consistency foods    Consult or Intervention:  First week of chemo / radiation.  Pt has maintained his wt fairly well over the past couple of weeks compared to previous wt decline.  Please refer to previous Oncology RD encounters and information provided to pt / family.  Pt reports he doesn't like the taste of Ensure samples previously provided.  Today, discussed potential nutrition side effects of chemotherapy (mucositis, n/v, diarrhea).  Reviewed MNT for n/v, diarrhea, and mucositis / oral care.  Pt has been given multiple recipes for milkshakes & soft / moist foods.      Nutrition Diagnosis: Unintentional weight loss related to decreased ability to consume sufficient energy as evidenced by physiological causes increasing nutrient needs. and hypermetabolic state.    Plan: Will follow up per oncology nutrition protocol

## 2023-10-16 NOTE — PROGRESS NOTES
"Presents for C1D1 of Carboplatin and paclitaxel     86 kg (189 lb 9.5 oz)  169 cm (66.54\")  Body surface area is 1.97 meters squared.    Doses verified using today's parameters and are within acceptable limits of variation and rounding.     Labs reviewed and are within EPIC hold parameter limits to proceed.     Patient was educated on information about each medication including dose, route, frequency, and common adverse effects. Patient was provided both verbal and written counseling. UpToDate patient information printed and provided to patient and key information verbally highlighted including: Overview of regimen including but not limited to infusion times; recognition and management of allergic/infusion reactions; \"call your doctor right away\" parameters.     All of the patient's questions were answered and patient expressed verbal understanding   " Ambulatory Care Management Note      Date/Time:  10/29/2020 2:26 PM    This patient was received as a referral from insurance referral / [employer] reviewed with the provider   - Patient reports Decadron is being reduced by Dr Kya Castro the plan after dose reduction is for her to schedule to see Dr Laine Light for outpatient endocrine follow up to al. For possible adrenal insufficiency. She has contact info and plans to schedule soon.    -Patient verified she recently did advanced care plan with her family and clarified her wishes with them in case of any future events. She did clarify on the phone today that she does wish to be a full code. -All current meds verified with cardiology as some had been in question. Patient is taking all meds as ordered. A lot of meds dc'd but Lipitor should be continued. Verified she is taking Lipitor.     -Patient reports feeling great. She just notes she would like to work on continuing to improve her strength after recent cardiac events/ ICD placement. She will d/w Dr Kya Castro considering cardiac rehab. -Reviewed needed HM items. Patient reports she will d/w Dr Kya Castro taking a pneumonia shot, declined shingles shot, plans to have dexa scan in near future, knows she is due for a colonoscopy and will plan to consider that soon as she has had polyp in the past. She did have an eye exam and will ask [facility] to send report to PCP office for HM to be updated. Ambulatory  contacted patient for discussion and case management of CAD, CHF, AFIB, recent ICD placement, med clarification, Code status clarification and needed health maintenance. Summary of patients top problems:   1. Hypoglycemia while inpatient - quite significant on several recent hospitalizations. Not reporting any sx's of hypoglycemia at home. Dr Kya Castro office contacted and in agreement she should see endocrine for concern of adrenal insufficiency.  Dr Kya Castro is reducing Decadron and then she will plan to schedule with Dr Osmani Patrick in 6 weeks. 2. Decreased strength- Patient reports she is feeling good but has a desire to continue to try to work towards strengthening. She has had several significant cardiac events including arrests and recent ICD placement. She now feels ready to try to get back to exercising / strengthening back to her baseline. She will d/w Dr Cynthia Hatfield if he feels cardiac rehab would be a good fit for her or if he feels she should begin to resume exercise and at what level. 3. Med management- Worked with patient to verify with cardiology all recent med adjustments. PCP adjusted med rec accordingly. Patient and providers verified she is taking all meds as ordered and managing well. Med rec updated to reflect current regimen. 4. Health maintenance- patient due for pneumonia vaccine, dexa scan and colonoscopy. She does not want to take Shingles vaccine. Patient will keep these items in mind and begin to complete them as she recovers from recent acute illness with multiple hospitalizations. Due for routine labs per Dr Marisa Daniel that she plans to do at next appt with Dr Cynthia Hatfield. Patient's challenges to self management identified:   level of motivation and multi health system providers      Medication Management:  good adherence and good understanding    Advance Care Planning:   Does patient have an Advance Directive:  Patient reports recently completed with family and her sister has copies -asked to bring in to Dr Ilda Montague office to be scanned in at next appt. Verified she would like to elect full code status    [employer], Referrals, and Durable Medical Equipment: none    PCP/Specialist follow up: No future appointments. Goals      patient will see endocrinology as recommended (pt-stated)      10/29/20- Patient reports she has info as provided for Dr Osmani Patrick. Per dr Cynthia Hatfield, her decadron is being reduced.  He would like her to see Dr Osmani Patrick in 6 weeks for further workup. Pt plans to schedule this. ACM will verify with her in the next 3 weeks or so that she has scheduled this visit. LN       Returns to baseline activity level. 10/29/20- Patient voices a desire to return to baseline level of strength. She has had several significant recent cardiac events and would like to begin exercise/ strengthening to return to her baseline level of strength. She will d/w Dr Pavithra Dillard if cardiac rehab would be a good option or if she can begin to exercise and at what capacity. ACM will follow up in 2 weeks . LN             Patient verbalized understanding of all information discussed. Patient has this Ambulatory Care Manager's contact information for any further questions, concerns, or needs. Note forwarded to Dr Daniel mAaya for coordination of care.

## 2023-10-16 NOTE — PROGRESS NOTES
Reason for Referral: Rounding with new patient and high distress screening score    Diagnosis: Esophageal cancer    Distress Score: 6 with concerns indicated for pain, fatigue, and worry or anxiety     Location of Distress Screening: [facility]    PHQ Score: 3 Patient reported a history of being treated for bipolar disorder since 2003. Patient stated his medications seems to control his mood. Patient stated he does not have a history of suicidal attempts.    Current Treatment Plan: neoadjuvant chemo and radiation therapy     Previous Cancer [place]: Patient stated this is his first cancer diagnosis.    Mental Status: Patient was very pleasant and easy to engage.  Patient was oriented x4.  Patient's wife was with him at his infusion chair supporting him.  Patient stated he has an extensive history of being treated for bipolar disorder since 2003. Patient also has a history of substance abuse.  Patient denied any history of suicidal attempts.  Patient denied any thoughts of suicide or homicide today.  Patient reported he had formal treatment through [facility] in [place], [place]. Patient reported his provider left the agency and he moved all of his prescriptions to be followed and refilled by his primary care doctor.  Patient seemed to have his cognitive and memory skills intact.  OSW provided emotional support through active listening, empathizing, normalizing and validating patient's thoughts and feelings.    Mental Health Treatment: Prior mental health treatment for bipolar disorder currently under the care of his PCP.    Substance Use/Tobacco Use: Patient reported he is currently smoking marijuana, has not used alcohol since his DUI in 2003, and smokes half a pack of cigarettes per day.    Spirituality: Patient reported has no spirituality practices.    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  Patient  stated he has not been in his workshop lately due to his fatigue and pain.    Support systems: Patient reported his primary support system consist of his wife, 4 adult children, and 5 grandchildren with 1 grandchild on the way.    Residential status/Who lives in the home: Patient reported he lives in the home with his wife and 2 dogs that go in and out of the home.    Transportation: Patient reported that his wife will be transporting him to and from his treatments.  Patient will need assistance with gas as he is driving 38 miles 1 way to treatment.  OSW was given permission to apply to [facility], [employer] to [place], and [facility] gas cards.  Patient received a gas card today.    Financial Concerns: Patient reported that he is on disability and is the sole income for him and his wife at this time.  Patient's wife reported that she was working full-time but is no longer working and is attending school.    Home Care Needs: Patient reported there are no in-home care needs at this time.    Advanced Directive/Living Will: None on file    Insurance: [facility] Medicare replacement and [facility] Medicaid    Resources/Referrals: OSW provided patient and his wife with her business card and an overview of oncology social work services.  OSW provided patient with a gas card to assist with transportation.  Patient did not identify any other needs or barriers to care except transportation.  Patient provided permission to OSW to apply to [facility] for gas assistance and smoking cessation education and [employer] [place] for gas assistance.      [employer] submitted 10/17/23

## 2023-10-17 ENCOUNTER — HOSPITAL ENCOUNTER (OUTPATIENT)
Dept: RADIATION ONCOLOGY | Facility: HOSPITAL | Age: 58
Discharge: HOME | End: 2023-10-17
Payer: MEDICARE

## 2023-10-17 ENCOUNTER — DOCUMENTATION (OUTPATIENT)
Dept: RADIATION ONCOLOGY | Facility: HOSPITAL | Age: 58
End: 2023-10-17
Payer: MEDICARE

## 2023-10-17 ENCOUNTER — HOSPITAL ENCOUNTER (OUTPATIENT)
Dept: RADIATION ONCOLOGY | Facility: HOSPITAL | Age: 58
Discharge: HOME | End: 2023-10-17

## 2023-10-17 VITALS
DIASTOLIC BLOOD PRESSURE: 95 MMHG | SYSTOLIC BLOOD PRESSURE: 157 MMHG | HEART RATE: 88 BPM | WEIGHT: 192.02 LBS | BODY MASS INDEX: 30.5 KG/M2 | OXYGEN SATURATION: 100 % | TEMPERATURE: 98.1 F

## 2023-10-17 DIAGNOSIS — C15.5 MALIGNANT NEOPLASM OF LOWER THIRD OF ESOPHAGUS: Primary | ICD-10-CM

## 2023-10-17 LAB
RAD ONC ARIA COURSE ID: NORMAL
RAD ONC ARIA COURSE INTENT: NORMAL
RAD ONC ARIA COURSE LAST TREATMENT DATE: NORMAL
RAD ONC ARIA COURSE START DATE: NORMAL
RAD ONC ARIA COURSE TREATMENT ELAPSED DAYS: 1
RAD ONC ARIA FIRST TREATMENT DATE: NORMAL
RAD ONC ARIA PLAN FRACTIONS TREATED TO DATE: 2
RAD ONC ARIA PLAN ID: NORMAL
RAD ONC ARIA PLAN PRESCRIBED DOSE PER FRACTION: 1.8 GY
RAD ONC ARIA PLAN PRIMARY REFERENCE POINT: NORMAL
RAD ONC ARIA PLAN TOTAL FRACTIONS PRESCRIBED: 25
RAD ONC ARIA PLAN TOTAL PRESCRIBED DOSE: 4500 CGY
RAD ONC ARIA REFERENCE POINT DOSAGE GIVEN TO DATE: 3.6 GY
RAD ONC ARIA REFERENCE POINT ID: NORMAL
RAD ONC ARIA REFERENCE POINT SESSION DOSAGE GIVEN: 1.8 GY

## 2023-10-17 PROCEDURE — 77386: CPT | Performed by: RADIOLOGY

## 2023-10-17 PROCEDURE — 77014 CHG CT GUIDANCE RADIATION THERAPY FLDS PLACEMENT: CPT | Performed by: RADIOLOGY

## 2023-10-17 RX ORDER — BENZONATATE 100 MG/1
100 CAPSULE ORAL 3 TIMES DAILY PRN
Qty: 90 CAPSULE | Refills: 2 | Status: SHIPPED | OUTPATIENT
Start: 2023-10-17

## 2023-10-17 NOTE — PROGRESS NOTES
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  He is receiving radiation therapy to the esophagus. He  has received 360 cGy in 2 fractions out of a planned dose of 5040 cGy in 28 fractions. He is currently receiving concurrent carboplatin/paclitaxel chemotherapy per Dr. Johnson.     Persistence of cough                                            Review of Systems:   Review of Systems   Constitutional:  Positive for appetite change (Unable to eat due to difficulty swallowing/food comes back up.), fatigue (9/10) and unexpected weight change (40lb weight loss over 1.5 months.).   HENT:  Positive for sore throat (Slight) and trouble swallowing (States that food is difficult to go down, comes back up.). Negative for hearing loss, tinnitus and voice change.    Eyes:  Negative for visual disturbance.   Respiratory:  Positive for cough (with yellow secretions x 1 week). Negative for chest tightness and shortness of breath.    Cardiovascular:  Negative for chest pain, palpitations and leg swelling.   Gastrointestinal:  Positive for vomiting (When food gets stuck and comes back up.). Negative for anal bleeding, blood in stool, constipation, diarrhea and nausea.   Genitourinary:  Negative for difficulty urinating, dysuria, frequency, hematuria and urgency.   Musculoskeletal:  Positive for arthralgias (Generalized, new) and back pain (New). Negative for joint swelling.   Skin:  Negative for color change and rash.   Neurological:  Positive for weakness (Generalized,  newer), numbness (Left hand, new) and headaches (Almost daily, different times of the day.). Negative for dizziness.   Psychiatric/Behavioral:  Positive for sleep disturbance (Due to pain.). Negative for self-injury and suicidal ideas.        Vitals:     Vitals:    10/17/23 1459   BP: 157/95   Pulse: 88   Temp: 98.1 °F (36.7 °C)   SpO2: 100%       Weight:   Wt Readings from Last 3 Encounters:   10/17/23 87.1 kg (192 lb 0.3 oz)   10/16/23 86 kg (189 lb 9.5 oz)   10/12/23 86.6 kg (190 lb 14.7 oz)      Pain:    Pain Score    10/17/23 1459   PainSc:   8   PainLoc: Throat         Physical Exam:  Gen: WD/WN; NAD  HEENT: MMM  Trachea: midline  Chest: symmetric  Resp: normal respiratory effort  Extr: warm, well-perfused  Neuro: awake and alert; no aphasia or neglect    Plan: I have reviewed treatment setup notes, checked and approved the daily guidance images.  I reviewed dose delivery, treatment parameters and deemed them appropriate. We plan to continue radiation therapy as prescribed.    Rx Tessalon Perles      Radiation Oncology    Electronically signed by Javy George MD 10/17/2023  08:15 EDT     24-Feb-2022 22:17

## 2023-10-17 NOTE — PROGRESS NOTES
Diagnosis: Esophageal cancer    Reason for Referral: Gas assistance    Content of Visit: OSW received a notification from radiation oncology manager that radiation oncologist inquired if Mr. Saunders can receive a gas card today. Advised that my colleague, Velma TAMAYO, provided Mr. Saunders with a gas card yesterday and if he receives another one today, he will only be eligible to receive three more before depleting his limit of 5 per year per our established protocol. Mr. Saunders chose to accept his 2nd gas card today. OSW support remains available.     Resources/Referrals Provided: $15 gas card

## 2023-10-18 ENCOUNTER — TELEPHONE (OUTPATIENT)
Dept: ONCOLOGY | Facility: HOSPITAL | Age: 58
End: 2023-10-18
Payer: MEDICARE

## 2023-10-18 ENCOUNTER — HOSPITAL ENCOUNTER (OUTPATIENT)
Dept: RADIATION ONCOLOGY | Facility: HOSPITAL | Age: 58
Discharge: HOME | End: 2023-10-18

## 2023-10-18 LAB
RAD ONC ARIA COURSE ID: NORMAL
RAD ONC ARIA COURSE INTENT: NORMAL
RAD ONC ARIA COURSE LAST TREATMENT DATE: NORMAL
RAD ONC ARIA COURSE START DATE: NORMAL
RAD ONC ARIA COURSE TREATMENT ELAPSED DAYS: 2
RAD ONC ARIA FIRST TREATMENT DATE: NORMAL
RAD ONC ARIA PLAN FRACTIONS TREATED TO DATE: 3
RAD ONC ARIA PLAN ID: NORMAL
RAD ONC ARIA PLAN PRESCRIBED DOSE PER FRACTION: 1.8 GY
RAD ONC ARIA PLAN PRIMARY REFERENCE POINT: NORMAL
RAD ONC ARIA PLAN TOTAL FRACTIONS PRESCRIBED: 25
RAD ONC ARIA PLAN TOTAL PRESCRIBED DOSE: 4500 CGY
RAD ONC ARIA REFERENCE POINT DOSAGE GIVEN TO DATE: 5.4 GY
RAD ONC ARIA REFERENCE POINT ID: NORMAL
RAD ONC ARIA REFERENCE POINT SESSION DOSAGE GIVEN: 1.8 GY

## 2023-10-18 PROCEDURE — 77386: CPT | Performed by: RADIOLOGY

## 2023-10-18 NOTE — TELEPHONE ENCOUNTER
Shashank Saunders 1965       Symptoms    Does patient have nausea and vomiting?    Does patient have medications prescribed for N/V?    Does patient have diarrhea?    Does the patient have diarrhea medications?    Does the patient have pain?    Is pain medications effective?    Discharge  Do you have any questions about your discharge instructions?    Patient Satisfaction with Cancer [facility]    Where you satisfied with the care you received?    Pt suggestions for the Cancer Care Center    Do you have any suggestions to improve your care?    Comments    Follow up phone call comments  No one answered the phone. This nurse left a vm.

## 2023-10-23 ENCOUNTER — TELEPHONE (OUTPATIENT)
Dept: ONCOLOGY | Facility: HOSPITAL | Age: 58
End: 2023-10-23
Payer: MEDICARE

## 2023-10-23 NOTE — TELEPHONE ENCOUNTER
Caller: VANESSA VANG    Relationship to patient: Emergency Contact    Best call back number: [phone]    Chief complaint: CAR TROUBLE    Type of visit: INFUSION & F/U 1    Requested date: CALL TO R/S      If rescheduling, when is the original appointment: 10/23/2023    Additional notes:

## 2023-10-24 ENCOUNTER — HOSPITAL ENCOUNTER (OUTPATIENT)
Dept: RADIATION ONCOLOGY | Facility: HOSPITAL | Age: 58
Setting detail: RADIATION/ONCOLOGY SERIES
Discharge: HOME | End: 2023-10-24
Payer: MEDICARE

## 2023-10-24 ENCOUNTER — HOSPITAL ENCOUNTER (OUTPATIENT)
Dept: RADIATION ONCOLOGY | Facility: HOSPITAL | Age: 58
Discharge: HOME | End: 2023-10-24

## 2023-10-24 VITALS
BODY MASS INDEX: 29.38 KG/M2 | TEMPERATURE: 98.7 F | RESPIRATION RATE: 20 BRPM | HEART RATE: 93 BPM | SYSTOLIC BLOOD PRESSURE: 148 MMHG | DIASTOLIC BLOOD PRESSURE: 93 MMHG | OXYGEN SATURATION: 99 % | WEIGHT: 184.97 LBS

## 2023-10-24 DIAGNOSIS — C15.5 MALIGNANT NEOPLASM OF LOWER THIRD OF ESOPHAGUS: Primary | ICD-10-CM

## 2023-10-24 LAB
RAD ONC ARIA COURSE ID: NORMAL
RAD ONC ARIA COURSE INTENT: NORMAL
RAD ONC ARIA COURSE LAST TREATMENT DATE: NORMAL
RAD ONC ARIA COURSE START DATE: NORMAL
RAD ONC ARIA COURSE TREATMENT ELAPSED DAYS: 8
RAD ONC ARIA FIRST TREATMENT DATE: NORMAL
RAD ONC ARIA PLAN FRACTIONS TREATED TO DATE: 4
RAD ONC ARIA PLAN ID: NORMAL
RAD ONC ARIA PLAN PRESCRIBED DOSE PER FRACTION: 1.8 GY
RAD ONC ARIA PLAN PRIMARY REFERENCE POINT: NORMAL
RAD ONC ARIA PLAN TOTAL FRACTIONS PRESCRIBED: 25
RAD ONC ARIA PLAN TOTAL PRESCRIBED DOSE: 4500 CGY
RAD ONC ARIA REFERENCE POINT DOSAGE GIVEN TO DATE: 7.2 GY
RAD ONC ARIA REFERENCE POINT ID: NORMAL
RAD ONC ARIA REFERENCE POINT SESSION DOSAGE GIVEN: 1.8 GY

## 2023-10-24 PROCEDURE — 77386: CPT | Performed by: RADIOLOGY

## 2023-10-24 NOTE — PROGRESS NOTES
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  He is receiving radiation therapy to the esophagus. He  has received 720 cGy in 4 fractions out of a planned dose of 5040 cGy in 28 fractions. He is currently receiving concurrent carboplatin/paclitaxel chemotherapy per Dr. Johnson.     Persistence of cough that is improving. Did not fill tessalon perles due to cost                                            Review of Systems:   Review of Systems   Constitutional:  Positive for appetite change (Unable to eat due to difficulty swallowing/food comes back up.  Able to eat soft foods.), fatigue (8/10) and unexpected weight change (Down 8lbs since last week.).   HENT:  Positive for trouble swallowing (States that certain foods are difficult to go down, comes back up.). Negative for hearing loss, sore throat, tinnitus and voice change.    Eyes:  Positive for discharge (with white/yellow drainage, recently dx with flu.). Negative for visual disturbance.   Respiratory:  Positive for cough (with yellow secretions x 1 week). Negative for chest tightness and shortness of breath.    Cardiovascular:  Negative for chest pain, palpitations and leg swelling.   Gastrointestinal:  Positive for vomiting (When food gets stuck and comes back up.). Negative for anal bleeding, blood in stool, constipation, diarrhea and nausea.   Genitourinary:  Negative for difficulty urinating, dysuria, frequency, hematuria and urgency.   Musculoskeletal:  Positive for arthralgias (Generalized,  recently dx with flu.  Recent pain in left shoulder, burning sensation, pain comes and goes, newer) and back pain (New). Negative for joint swelling.   Skin:  Positive for color change (Bruising noted to left chest, s/p port placement.). Negative for rash.   Neurological:  Positive for weakness (Generalized) and headaches (Occasional, different times of the day.). Negative for dizziness and numbness.   Psychiatric/Behavioral:  Positive for sleep disturbance (Due to pain, takes meds with some relief.).        Vitals:     Vitals:    10/24/23 1505   BP: 148/93   Pulse: 93   Resp: 20   Temp: 98.7 °F (37.1 °C)   SpO2: 99%       Weight:   Wt Readings from Last 3 Encounters:   10/24/23 83.9 kg (184 lb 15.5 oz)   10/17/23 87.1 kg (192 lb 0.3 oz)   10/16/23 86 kg (189 lb 9.5 oz)      Pain:    Pain Score    10/24/23 1505   PainSc:   9   PainLoc: Generalized         Physical Exam:  Gen: WD/WN; NAD  HEENT: MMM  Trachea: midline  Chest: symmetric  Resp: normal respiratory effort  Extr: warm, well-perfused  Neuro: awake and alert; no aphasia or neglect    Plan: I have reviewed treatment setup notes, checked and approved the daily guidance images.  I reviewed dose delivery, treatment parameters and deemed them appropriate. We plan to continue radiation therapy as prescribed.          Radiation Oncology    Electronically signed by Javy George MD 10/24/2023  15:40 EDT

## 2023-10-25 ENCOUNTER — HOSPITAL ENCOUNTER (OUTPATIENT)
Dept: RADIATION ONCOLOGY | Facility: HOSPITAL | Age: 58
Discharge: HOME | End: 2023-10-25

## 2023-10-25 VITALS — WEIGHT: 188.49 LBS | BODY MASS INDEX: 29.94 KG/M2

## 2023-10-25 LAB
RAD ONC ARIA COURSE ID: NORMAL
RAD ONC ARIA COURSE INTENT: NORMAL
RAD ONC ARIA COURSE LAST TREATMENT DATE: NORMAL
RAD ONC ARIA COURSE START DATE: NORMAL
RAD ONC ARIA COURSE TREATMENT ELAPSED DAYS: 9
RAD ONC ARIA FIRST TREATMENT DATE: NORMAL
RAD ONC ARIA PLAN FRACTIONS TREATED TO DATE: 5
RAD ONC ARIA PLAN ID: NORMAL
RAD ONC ARIA PLAN PRESCRIBED DOSE PER FRACTION: 1.8 GY
RAD ONC ARIA PLAN PRIMARY REFERENCE POINT: NORMAL
RAD ONC ARIA PLAN TOTAL FRACTIONS PRESCRIBED: 25
RAD ONC ARIA PLAN TOTAL PRESCRIBED DOSE: 4500 CGY
RAD ONC ARIA REFERENCE POINT DOSAGE GIVEN TO DATE: 9 GY
RAD ONC ARIA REFERENCE POINT ID: NORMAL
RAD ONC ARIA REFERENCE POINT SESSION DOSAGE GIVEN: 1.8 GY

## 2023-10-25 PROCEDURE — 77386: CPT | Performed by: RADIOLOGY

## 2023-10-25 PROCEDURE — 77336 RADIATION PHYSICS CONSULT: CPT | Performed by: RADIOLOGY

## 2023-10-25 NOTE — PROGRESS NOTES
Outpatient Nutrition Oncology Follow Up    Patient Name: Shashank Saunders  YOB: 1965  MRN: [id number]    Recommendation(s):   High kcal, high protein small, frequent meals of soft / moist foods.  Ensure Complete made as a milkshake at least 4 X day (or Boost VHC milkshakes TID).  Adequate fluids.    Reason for Consult:  Radiation Tx Follow up       Today's Weight:  85.5 kg    Weight Change:    4# wt decline in the past week (2% wt decline in 1 week)  Wt had declined to 184# yesterday 10/24/23, but now back up to 188# today  Total wt decline:  5.3% in the past month- significant    Estimated nutritional needs (daily):  3420 kcals, 171 gm protein, 3420 mL fluid    Nutrition-related concerns: Other: Decreased appetite, ongoing dysphagia which results in frequent emesis, nausea    Current PO intake:  Eating small / frequent amounts of food (tries to eat soft / moist foods, but able to eat some regular consistency foods at times)     Current Nutrition Supplement intake:  Wife prepares high kcal / high protein milkshakes made with Ensure Complete (350 kcals, 30 gm protein) as the shake base    Consult or Intervention:  Pt reports to be making a good effort to eat foods he can tolerate in small, frequent amounts.  Reminded that all foods need to be high in kcals, high in protein.  Encouraged majority of nutritional intake to come from the homemade shakes wife is preparing, as it will decrease esophageal aggravation and provide maximum kcals / protein per volume.  Provided pt with information on Boost Very High Calorie ONS and how to order online- to help with increasing the kcals & protein in his shakes.     Nutrition Diagnosis: Unintentional weight loss related to increased nutrient needs due to catabolic disease as evidenced by physiological causes increasing nutrient needs. and hypermetabolic state.    Plan: Will follow up per oncology nutrition protocol

## 2023-10-26 ENCOUNTER — HOSPITAL ENCOUNTER (OUTPATIENT)
Dept: RADIATION ONCOLOGY | Facility: HOSPITAL | Age: 58
Discharge: HOME | End: 2023-10-26

## 2023-10-26 LAB
RAD ONC ARIA COURSE ID: NORMAL
RAD ONC ARIA COURSE INTENT: NORMAL
RAD ONC ARIA COURSE LAST TREATMENT DATE: NORMAL
RAD ONC ARIA COURSE START DATE: NORMAL
RAD ONC ARIA COURSE TREATMENT ELAPSED DAYS: 10
RAD ONC ARIA FIRST TREATMENT DATE: NORMAL
RAD ONC ARIA PLAN FRACTIONS TREATED TO DATE: 6
RAD ONC ARIA PLAN ID: NORMAL
RAD ONC ARIA PLAN PRESCRIBED DOSE PER FRACTION: 1.8 GY
RAD ONC ARIA PLAN PRIMARY REFERENCE POINT: NORMAL
RAD ONC ARIA PLAN TOTAL FRACTIONS PRESCRIBED: 25
RAD ONC ARIA PLAN TOTAL PRESCRIBED DOSE: 4500 CGY
RAD ONC ARIA REFERENCE POINT DOSAGE GIVEN TO DATE: 10.8 GY
RAD ONC ARIA REFERENCE POINT ID: NORMAL
RAD ONC ARIA REFERENCE POINT SESSION DOSAGE GIVEN: 1.8 GY

## 2023-10-26 PROCEDURE — 77386: CPT | Performed by: RADIOLOGY

## 2023-10-29 DIAGNOSIS — C15.5 MALIGNANT NEOPLASM OF LOWER THIRD OF ESOPHAGUS: ICD-10-CM

## 2023-10-29 RX ORDER — OXYCODONE HYDROCHLORIDE AND ACETAMINOPHEN 5; 325 MG/1; MG/1
1 TABLET ORAL EVERY 6 HOURS PRN
Qty: 90 TABLET | Refills: 0 | Status: CANCELLED | OUTPATIENT
Start: 2023-10-29

## 2023-10-30 ENCOUNTER — DOCUMENTATION (OUTPATIENT)
Dept: ONCOLOGY | Facility: HOSPITAL | Age: 58
End: 2023-10-30
Payer: MEDICARE

## 2023-10-30 ENCOUNTER — OFFICE VISIT (OUTPATIENT)
Dept: ONCOLOGY | Facility: HOSPITAL | Age: 58
End: 2023-10-30
Payer: MEDICARE

## 2023-10-30 ENCOUNTER — HOSPITAL ENCOUNTER (OUTPATIENT)
Dept: RADIATION ONCOLOGY | Facility: HOSPITAL | Age: 58
Discharge: HOME | End: 2023-10-30
Payer: MEDICARE

## 2023-10-30 ENCOUNTER — HOSPITAL ENCOUNTER (OUTPATIENT)
Dept: ONCOLOGY | Facility: HOSPITAL | Age: 58
Discharge: HOME | End: 2023-10-30
Admitting: INTERNAL MEDICINE
Payer: MEDICARE

## 2023-10-30 VITALS
TEMPERATURE: 98.1 F | WEIGHT: 184.53 LBS | HEIGHT: 67 IN | OXYGEN SATURATION: 97 % | SYSTOLIC BLOOD PRESSURE: 151 MMHG | RESPIRATION RATE: 18 BRPM | BODY MASS INDEX: 28.96 KG/M2 | HEART RATE: 90 BPM | DIASTOLIC BLOOD PRESSURE: 90 MMHG

## 2023-10-30 VITALS
BODY MASS INDEX: 28.96 KG/M2 | DIASTOLIC BLOOD PRESSURE: 83 MMHG | HEIGHT: 67 IN | OXYGEN SATURATION: 97 % | SYSTOLIC BLOOD PRESSURE: 146 MMHG | WEIGHT: 184.53 LBS | TEMPERATURE: 98.1 F | HEART RATE: 95 BPM | RESPIRATION RATE: 18 BRPM

## 2023-10-30 DIAGNOSIS — G89.3 NEOPLASM RELATED PAIN: Primary | ICD-10-CM

## 2023-10-30 DIAGNOSIS — Z45.2 ENCOUNTER FOR ADJUSTMENT OR MANAGEMENT OF VASCULAR ACCESS DEVICE: ICD-10-CM

## 2023-10-30 DIAGNOSIS — I63.9 CEREBROVASCULAR ACCIDENT (CVA), UNSPECIFIED MECHANISM: ICD-10-CM

## 2023-10-30 DIAGNOSIS — C15.5 MALIGNANT NEOPLASM OF LOWER THIRD OF ESOPHAGUS: Primary | ICD-10-CM

## 2023-10-30 DIAGNOSIS — C15.5 MALIGNANT NEOPLASM OF LOWER THIRD OF ESOPHAGUS: ICD-10-CM

## 2023-10-30 PROBLEM — C15.9 ESOPHAGEAL CANCER: Status: RESOLVED | Noted: 2023-10-04 | Resolved: 2023-10-30

## 2023-10-30 LAB
ALBUMIN SERPL-MCNC: 4.1 G/DL (ref 3.5–5.2)
ALBUMIN/GLOB SERPL: 1.6 G/DL
ALP SERPL-CCNC: 68 U/L (ref 39–117)
ALT SERPL W P-5'-P-CCNC: 10 U/L (ref 1–41)
ANION GAP SERPL CALCULATED.3IONS-SCNC: 7.6 MMOL/L (ref 5–15)
AST SERPL-CCNC: 19 U/L (ref 1–40)
BASOPHILS # BLD AUTO: 0.06 10*3/MM3 (ref 0–0.2)
BASOPHILS NFR BLD AUTO: 1 % (ref 0–1.5)
BILIRUB SERPL-MCNC: 0.6 MG/DL (ref 0–1.2)
BUN SERPL-MCNC: 27 MG/DL (ref 6–20)
BUN/CREAT SERPL: 28.4 (ref 7–25)
CALCIUM SPEC-SCNC: 9.1 MG/DL (ref 8.6–10.5)
CHLORIDE SERPL-SCNC: 104 MMOL/L (ref 98–107)
CO2 SERPL-SCNC: 29.4 MMOL/L (ref 22–29)
CREAT SERPL-MCNC: 0.95 MG/DL (ref 0.76–1.27)
DEPRECATED RDW RBC AUTO: 47.9 FL (ref 37–54)
EGFRCR SERPLBLD CKD-EPI 2021: 92.2 ML/MIN/1.73
EOSINOPHIL # BLD AUTO: 0.19 10*3/MM3 (ref 0–0.4)
EOSINOPHIL NFR BLD AUTO: 3 % (ref 0.3–6.2)
ERYTHROCYTE [DISTWIDTH] IN BLOOD BY AUTOMATED COUNT: 13.9 % (ref 12.3–15.4)
GLOBULIN UR ELPH-MCNC: 2.5 GM/DL
GLUCOSE SERPL-MCNC: 123 MG/DL (ref 65–99)
HCT VFR BLD AUTO: 38.3 % (ref 37.5–51)
HGB BLD-MCNC: 12.7 G/DL (ref 13–17.7)
IMM GRANULOCYTES # BLD AUTO: 0.01 10*3/MM3 (ref 0–0.05)
IMM GRANULOCYTES NFR BLD AUTO: 0.2 % (ref 0–0.5)
LYMPHOCYTES # BLD AUTO: 1.48 10*3/MM3 (ref 0.7–3.1)
LYMPHOCYTES NFR BLD AUTO: 23.6 % (ref 19.6–45.3)
MCH RBC QN AUTO: 30.8 PG (ref 26.6–33)
MCHC RBC AUTO-ENTMCNC: 33.2 G/DL (ref 31.5–35.7)
MCV RBC AUTO: 92.7 FL (ref 79–97)
MONOCYTES # BLD AUTO: 1.12 10*3/MM3 (ref 0.1–0.9)
MONOCYTES NFR BLD AUTO: 17.9 % (ref 5–12)
NEUTROPHILS NFR BLD AUTO: 3.41 10*3/MM3 (ref 1.7–7)
NEUTROPHILS NFR BLD AUTO: 54.3 % (ref 42.7–76)
PLATELET # BLD AUTO: 254 10*3/MM3 (ref 140–450)
PMV BLD AUTO: 10.2 FL (ref 6–12)
POTASSIUM SERPL-SCNC: 3.3 MMOL/L (ref 3.5–5.2)
PROT SERPL-MCNC: 6.6 G/DL (ref 6–8.5)
RAD ONC ARIA COURSE ID: NORMAL
RAD ONC ARIA COURSE INTENT: NORMAL
RAD ONC ARIA COURSE LAST TREATMENT DATE: NORMAL
RAD ONC ARIA COURSE START DATE: NORMAL
RAD ONC ARIA COURSE TREATMENT ELAPSED DAYS: 14
RAD ONC ARIA FIRST TREATMENT DATE: NORMAL
RAD ONC ARIA PLAN FRACTIONS TREATED TO DATE: 7
RAD ONC ARIA PLAN ID: NORMAL
RAD ONC ARIA PLAN PRESCRIBED DOSE PER FRACTION: 1.8 GY
RAD ONC ARIA PLAN PRIMARY REFERENCE POINT: NORMAL
RAD ONC ARIA PLAN TOTAL FRACTIONS PRESCRIBED: 25
RAD ONC ARIA PLAN TOTAL PRESCRIBED DOSE: 4500 CGY
RAD ONC ARIA REFERENCE POINT DOSAGE GIVEN TO DATE: 12.6 GY
RAD ONC ARIA REFERENCE POINT ID: NORMAL
RAD ONC ARIA REFERENCE POINT SESSION DOSAGE GIVEN: 1.8 GY
RBC # BLD AUTO: 4.13 10*6/MM3 (ref 4.14–5.8)
SODIUM SERPL-SCNC: 141 MMOL/L (ref 136–145)
WBC NRBC COR # BLD: 6.27 10*3/MM3 (ref 3.4–10.8)

## 2023-10-30 PROCEDURE — 96417 CHEMO IV INFUS EACH ADDL SEQ: CPT

## 2023-10-30 PROCEDURE — 96413 CHEMO IV INFUSION 1 HR: CPT

## 2023-10-30 PROCEDURE — 3079F DIAST BP 80-89 MM HG: CPT | Performed by: INTERNAL MEDICINE

## 2023-10-30 PROCEDURE — 77386: CPT | Performed by: RADIOLOGY

## 2023-10-30 PROCEDURE — 25010000002 HEPARIN LOCK FLUSH PER 10 UNITS: Performed by: INTERNAL MEDICINE

## 2023-10-30 PROCEDURE — 25010000002 PACLITAXEL PER 1 MG: Performed by: INTERNAL MEDICINE

## 2023-10-30 PROCEDURE — 25010000002 DEXAMETHASONE SODIUM PHOSPHATE 120 MG/30ML SOLUTION: Performed by: INTERNAL MEDICINE

## 2023-10-30 PROCEDURE — 25810000003 SODIUM CHLORIDE 0.9 % SOLUTION: Performed by: INTERNAL MEDICINE

## 2023-10-30 PROCEDURE — 80053 COMPREHEN METABOLIC PANEL: CPT | Performed by: INTERNAL MEDICINE

## 2023-10-30 PROCEDURE — 25010000002 PALONOSETRON PER 25 MCG: Performed by: INTERNAL MEDICINE

## 2023-10-30 PROCEDURE — 3077F SYST BP >= 140 MM HG: CPT | Performed by: INTERNAL MEDICINE

## 2023-10-30 PROCEDURE — 25010000002 DIPHENHYDRAMINE PER 50 MG: Performed by: INTERNAL MEDICINE

## 2023-10-30 PROCEDURE — 25010000002 CARBOPLATIN PER 50 MG: Performed by: INTERNAL MEDICINE

## 2023-10-30 PROCEDURE — 85025 COMPLETE CBC W/AUTO DIFF WBC: CPT | Performed by: INTERNAL MEDICINE

## 2023-10-30 PROCEDURE — 99214 OFFICE O/P EST MOD 30 MIN: CPT | Performed by: INTERNAL MEDICINE

## 2023-10-30 PROCEDURE — 1125F AMNT PAIN NOTED PAIN PRSNT: CPT | Performed by: INTERNAL MEDICINE

## 2023-10-30 PROCEDURE — 25810000003 SODIUM CHLORIDE 0.9 % SOLUTION 250 ML FLEX CONT: Performed by: INTERNAL MEDICINE

## 2023-10-30 PROCEDURE — 96375 TX/PRO/DX INJ NEW DRUG ADDON: CPT

## 2023-10-30 RX ORDER — HEPARIN SODIUM (PORCINE) LOCK FLUSH IV SOLN 100 UNIT/ML 100 UNIT/ML
500 SOLUTION INTRAVENOUS AS NEEDED
Status: CANCELLED | OUTPATIENT
Start: 2023-11-06

## 2023-10-30 RX ORDER — FAMOTIDINE 10 MG/ML
20 INJECTION, SOLUTION INTRAVENOUS ONCE
Status: CANCELLED | OUTPATIENT
Start: 2023-10-30

## 2023-10-30 RX ORDER — SODIUM CHLORIDE 0.9 % (FLUSH) 0.9 %
20 SYRINGE (ML) INJECTION AS NEEDED
Status: CANCELLED | OUTPATIENT
Start: 2023-10-30

## 2023-10-30 RX ORDER — SODIUM CHLORIDE 0.9 % (FLUSH) 0.9 %
20 SYRINGE (ML) INJECTION AS NEEDED
Status: DISCONTINUED | OUTPATIENT
Start: 2023-10-30 | End: 2023-10-31 | Stop reason: HOSPADM

## 2023-10-30 RX ORDER — PALONOSETRON 0.05 MG/ML
0.25 INJECTION, SOLUTION INTRAVENOUS ONCE
Status: COMPLETED | OUTPATIENT
Start: 2023-10-30 | End: 2023-10-30

## 2023-10-30 RX ORDER — ATORVASTATIN CALCIUM 40 MG/1
40 TABLET, FILM COATED ORAL NIGHTLY
Qty: 30 TABLET | Refills: 5 | Status: SHIPPED | OUTPATIENT
Start: 2023-10-30

## 2023-10-30 RX ORDER — HEPARIN SODIUM (PORCINE) LOCK FLUSH IV SOLN 100 UNIT/ML 100 UNIT/ML
500 SOLUTION INTRAVENOUS AS NEEDED
Status: DISCONTINUED | OUTPATIENT
Start: 2023-10-30 | End: 2023-10-31 | Stop reason: HOSPADM

## 2023-10-30 RX ORDER — ASPIRIN 81 MG/1
81 TABLET ORAL DAILY
Qty: 90 TABLET | Refills: 3 | Status: SHIPPED | OUTPATIENT
Start: 2023-10-30

## 2023-10-30 RX ORDER — FAMOTIDINE 10 MG/ML
20 INJECTION, SOLUTION INTRAVENOUS ONCE
Status: COMPLETED | OUTPATIENT
Start: 2023-10-30 | End: 2023-10-30

## 2023-10-30 RX ORDER — SODIUM CHLORIDE 9 MG/ML
250 INJECTION, SOLUTION INTRAVENOUS ONCE
Status: COMPLETED | OUTPATIENT
Start: 2023-10-30 | End: 2023-10-30

## 2023-10-30 RX ORDER — OXYCODONE HYDROCHLORIDE AND ACETAMINOPHEN 5; 325 MG/1; MG/1
2 TABLET ORAL EVERY 6 HOURS PRN
Qty: 120 TABLET | Refills: 0 | Status: SHIPPED | OUTPATIENT
Start: 2023-10-30

## 2023-10-30 RX ORDER — FAMOTIDINE 10 MG/ML
20 INJECTION, SOLUTION INTRAVENOUS AS NEEDED
Status: CANCELLED | OUTPATIENT
Start: 2023-10-30

## 2023-10-30 RX ORDER — DIPHENHYDRAMINE HYDROCHLORIDE 50 MG/ML
50 INJECTION INTRAMUSCULAR; INTRAVENOUS AS NEEDED
Status: CANCELLED | OUTPATIENT
Start: 2023-10-30

## 2023-10-30 RX ORDER — PALONOSETRON 0.05 MG/ML
0.25 INJECTION, SOLUTION INTRAVENOUS ONCE
Status: CANCELLED | OUTPATIENT
Start: 2023-10-30

## 2023-10-30 RX ORDER — SODIUM CHLORIDE 9 MG/ML
250 INJECTION, SOLUTION INTRAVENOUS ONCE
Status: CANCELLED | OUTPATIENT
Start: 2023-10-30

## 2023-10-30 RX ADMIN — PALONOSETRON 0.25 MG: 0.05 INJECTION, SOLUTION INTRAVENOUS at 11:06

## 2023-10-30 RX ADMIN — HEPARIN SODIUM (PORCINE) LOCK FLUSH IV SOLN 100 UNIT/ML 500 UNITS: 100 SOLUTION at 13:55

## 2023-10-30 RX ADMIN — DIPHENHYDRAMINE HYDROCHLORIDE 25 MG: 50 INJECTION, SOLUTION INTRAMUSCULAR; INTRAVENOUS at 11:35

## 2023-10-30 RX ADMIN — FAMOTIDINE 20 MG: 10 INJECTION INTRAVENOUS at 11:08

## 2023-10-30 RX ADMIN — PACLITAXEL 105 MG: 6 INJECTION, SOLUTION, CONCENTRATE INTRAVENOUS at 12:00

## 2023-10-30 RX ADMIN — Medication 20 ML: at 13:54

## 2023-10-30 RX ADMIN — DEXAMETHASONE SODIUM PHOSPHATE 12 MG: 4 INJECTION, SOLUTION INTRA-ARTICULAR; INTRALESIONAL; INTRAMUSCULAR; INTRAVENOUS; SOFT TISSUE at 11:10

## 2023-10-30 RX ADMIN — SODIUM CHLORIDE 250 ML: 9 INJECTION, SOLUTION INTRAVENOUS at 10:53

## 2023-10-30 RX ADMIN — CARBOPLATIN 260 MG: 600 INJECTION, SOLUTION INTRAVENOUS at 13:17

## 2023-10-30 NOTE — PROGRESS NOTES
"Chief Complaint  Malignant neoplasm of lower third of esophagus     Shyanne Grijalva, Shyanne Woods, LAUREN    Subjective          Shashank Saunders presents to [facility] for GEJ squamous cell carcinoma    Mr. 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  Patient has been started on chemoradiation as of 2 weeks ago. He missed last week's chemo appointment as he reports he had the flu. Treated it with dayquil over the counter and is feeling better. He reports before he started chemo he had an episode of confusion and going \"blue.\" Concern for seizure. This was on 10/5/23. MRI brain performed and showed possible subacute infarct. He reports he has completely recovered. He was having numbness in his arm and this has improved. He reports his pain is controlled on two pills of the percocet that he has. He does not require it frequently. He reports eating is stable, with some days better than others. He does sometimes have vomiting as he is not able to get the food all the way down.         Oncology/Hematology History Overview Note   9/8/23: Presented to [facility] ED with inability to tolerate solids and 40 lb weight loss. CT Chest showed CT scan of the chest with IV contrast demonstrating moderately dilated fluid-filled esophagus. Emphysema. Part solid opacity 6 mm in the right upper lobe.     9/9/23: EGD performed showing an ulcerated and irregular severe stenosis in the lower third of the esophagus, that was dilated and biopsied. GEJ biopsy showing poorly differentiated squamous cell carcinoma.     10/3/23: NM PET: 1.8 cm hypermetabolic mass in the distal esophagus near the GE junction consistent with the " patient's history of esophageal carcinoma.  No evidence of metastatic disease.    10/16/23: C1D1 Carboplatin and Paclitaxel. D1 of XRT.      Malignant neoplasm of lower third of esophagus   9/14/2023 Initial Diagnosis    Malignant neoplasm of lower third of esophagus     10/5/2023 Cancer Staged    Staging form: Esophagus - Squamous Cell Carcinoma, AJCC 8th Edition  - Clinical: Stage II (cT2, cN0, cM0) - Signed by Winstno Johnson MD on 10/5/2023     10/5/2023 -  Radiation    RADIATION THERAPY Treatment Details (Noted on 10/5/2023)  Site: Esophagus - Lower  Technique: IMRT  Goal: No goal specified  Planned Treatment Start Date: No planned start date specified     10/16/2023 -  Chemotherapy    OP GE PACLitaxel / CARBOplatin (weekly) + XRT           Review of Systems   Constitutional:  Positive for appetite change (Decreased) and fatigue.   HENT:  Positive for sore throat and trouble swallowing.    Genitourinary:  Positive for flank pain (RT side).   Musculoskeletal:  Positive for arthralgias and back pain.   Neurological:  Positive for weakness.   Psychiatric/Behavioral:  The patient is nervous/anxious.    All other systems reviewed and are negative.    Current Outpatient Medications on File Prior to Visit   Medication Sig Dispense Refill    albuterol (PROVENTIL) (2.5 MG/3ML) 0.083% nebulizer solution Inhale 2.5 mg.      atorvastatin (LIPITOR) 20 MG tablet Take 1 tablet by mouth Every Night.      benzonatate (Tessalon Perles) 100 MG capsule Take 1 capsule by mouth 3 (Three) Times a Day As Needed for Cough. 90 capsule 2    carvedilol (COREG) 12.5 MG tablet Take 1 tablet by mouth Every 12 (Twelve) Hours.      Eliquis 5 MG tablet tablet Take 1 tablet by mouth Every 12 (Twelve) Hours.      lisinopril (PRINIVIL,ZESTRIL) 10 MG tablet Take 1 tablet by mouth Daily.      metFORMIN (GLUCOPHAGE) 500 MG tablet Take 1 tablet by mouth 2 (Two) Times a Day With Meals.      ondansetron (ZOFRAN) 8 MG tablet Take 1 tablet by  mouth 3 (Three) Times a Day As Needed for Nausea or Vomiting. 30 tablet 5    ondansetron ODT (ZOFRAN-ODT) 4 MG disintegrating tablet Place 1 tablet on the tongue Every 8 (Eight) Hours As Needed.      oxyCODONE-acetaminophen (PERCOCET) 5-325 MG per tablet Take 1 tablet by mouth Every 6 (Six) Hours As Needed for Moderate Pain. 90 tablet 0    pantoprazole (PROTONIX) 40 MG EC tablet Take 1 tablet by mouth 2 (Two) Times a Day Before Meals. 60 tablet 2    QUEtiapine (SEROquel) 200 MG tablet Take 1 tablet by mouth Every Night.      sucralfate (CARAFATE) 1 g tablet Take 1 tablet by mouth 3 (Three) Times a Day Before Meals. 90 tablet 1     No current facility-administered medications on file prior to visit.       No Known Allergies  Past Medical History:   Diagnosis Date    Asthma     Congestive heart failure (CHF)     Coronary artery disease     Diabetes mellitus     Esophageal cancer     Heart attack     Hypertension      Past Surgical History:   Procedure Laterality Date    CARDIAC CATHETERIZATION      CARDIAC SURGERY      Stent x1    ENDOSCOPY N/A 9/9/2023    Procedure: ESOPHAGOGASTRODUODENOSCOPY WITH ESOPHAGEAL BALLOON DILATATION/BIOPSIES;  Surgeon: Gary Stein MD;  Location: [facility] ENDOSCOPY;  Service: Gastroenterology;  Laterality: N/A;  ULCERATED STRICTURE OF GE JUNCTION    VENOUS ACCESS DEVICE (PORT) INSERTION Left 10/12/2023    Procedure: INSERTION VENOUS ACCESS DEVICE;  Surgeon: Yung Montejo MD;  Location: [facility] MAIN OR;  Service: General;  Laterality: Left;     Social History     Socioeconomic History    Marital status:    Tobacco Use    Smoking status: Every Day     Packs/day: .5     Types: Cigarettes     Start date: 1979    Smokeless tobacco: Never   Vaping Use    Vaping Use: Never used   Substance and Sexual Activity    Alcohol use: Not Currently    Drug use: Not Currently     Types: Cocaine(coke), Marijuana    Sexual activity: Defer     History reviewed. No pertinent family  "history.    Objective   Physical Exam  Well appearing patient in no acute distress on RA  Anicteric sclerae, no rash on exposed skin  Respirations non-labored  + dupuytren's contracture left hand  Awake, alert, and oriented x 4. Speech intact. No gross neurologic deficit  Appropriate mood and affect    ECO    Vitals:    10/30/23 0934   BP: 146/83   Pulse: 95   Resp: 18   Temp: 98.1 °F (36.7 °C)   TempSrc: Temporal   SpO2: 97%   Weight: 83.7 kg (184 lb 8.4 oz)   Height: 169 cm (66.54\")   PainSc:   8   PainLoc: Hand                 PHQ-9 Total Score:           Result Review :   The following data was reviewed by: Winston Johnson MD on 10/30/23:  Lab Results   Component Value Date    HGB 13.1 10/16/2023    HCT 39.7 10/16/2023    MCV 93.9 10/16/2023     10/16/2023    WBC 11.81 (H) 10/16/2023    NEUTROABS 7.10 (H) 10/16/2023    LYMPHSABS 3.12 (H) 10/16/2023    MONOSABS 1.34 (H) 10/16/2023    EOSABS 0.20 10/16/2023    BASOSABS 0.03 10/16/2023     Lab Results   Component Value Date    GLUCOSE 118 (H) 10/16/2023    BUN 24 (H) 10/16/2023    CREATININE 0.91 10/16/2023     10/16/2023    K 4.1 10/16/2023     10/16/2023    CO2 32.0 (H) 10/16/2023    CALCIUM 9.1 10/16/2023    PROTEINTOT 6.2 10/16/2023    ALBUMIN 3.9 10/16/2023    BILITOT 0.6 10/16/2023    ALKPHOS 65 10/16/2023    AST 20 10/16/2023    ALT 11 10/16/2023     Lab Results   Component Value Date    MG 1.8 2023     Labs personally reviewed and essentially normal.     10/17/23 Rad onc note personally reviewed    XR Chest Post CVA Port    Result Date: 10/12/2023    1. Tip of the left subclavian central venous port catheter terminates in the upper SVC. 2. No visible pneumothorax.       TANVIR VEGA MD       Electronically Signed and Approved By: TANVIR VEGA MD on 10/12/2023 at 21:09             NM PET/CT Skull Base to Mid Thigh    Result Date: 10/4/2023     1. 1.8 cm hypermetabolic mass in the distal esophagus near the GE junction " consistent with the patient's history of esophageal carcinoma.  No evidence of metastatic disease.  2. 1.3 cm hypermetabolic skin lesion in the gluteal fold.  Recommend direct visualization.     LASHAE HELLER MD       Electronically Signed and Approved By: LASHAE HELLER MD on 10/04/2023 at 8:35                  Assessment and Plan    There are no diagnoses linked to this encounter.  GEJ squamous cell carcinoma  CT Chest without any metastatic disease, PET scan 10/3/23 for complete staging confirmed localized disease.  He has been seen by Dr. Chau with thoracic surgery and Dr. George with radiation oncology. Local disease treated with neoadjuvant chemoradiation followed by consideration for surgery. Chemotherapy with Carboplatin and Paclitaxel given weekly. First dose 10/16/23. PRN anti-emetics provided.     10/30/23: C2D1 Carboplatin and Paclitaxel. Labs appropriate to proceed      Neoplasm related pain  Continue Percocet 5-325 mg, two pills q6h PRN. Continue PRN NSAID.      CVA  10/5/23 MRI brain performed due to concern for seizure and showed likely bilateral subacute infarcts. Recommend to increase atorvastatin to 40 mg daily. Also will start ASA 81 mg daily. Patient already on eliquis.     This is an acute or chronic illness that poses a threat to life or bodily function. The above treatment plan involves a high risk of complications and/or mortality of patient management. Continue to monitor for severe toxicities with frequent labs and office visits.      I spent 25 minutes caring for Shashank on this date of service. This time includes time spent by me in the following activities:preparing for the visit, reviewing tests, obtaining and/or reviewing a separately obtained history, performing a medically appropriate examination and/or evaluation , counseling and educating the patient/family/caregiver, ordering medications, tests, or procedures, referring and communicating with other health care  professionals , documenting information in the medical record, independently interpreting results and communicating that information with the patient/family/caregiver, and care coordination    Patient Follow Up: Cycle 3 chemo  Patient was given instructions and counseling regarding his condition or for health maintenance advice. Please see specific information pulled into the AVS if appropriate.

## 2023-10-30 NOTE — PROGRESS NOTES
Diagnosis: Esophageal cancer    Reason for Referral: Patient requesting to meet with OSW.    Content of Visit: Patient requested a $15 gas card today.  OSW reviewed any barriers to care. Patient reported that he had been sick with the flu and the car did not work 1 day.  Patient stated he has the car back running and is planning to sell his motorcycle to purchase another vehicle.  Patient stated he received a phone call from [place] [facility] and was told they could have two gas cards before the end of the year that were $25 to a [employer].  Patient's spouse stated he will be in treatment through November 23.  OSW encouraged patient to contact if there were any barriers to care or unmet needs that need to be addressed in the future.  Patient voiced understanding.    Resources/Referrals Provided: Gas card through [facility].

## 2023-10-31 ENCOUNTER — HOSPITAL ENCOUNTER (OUTPATIENT)
Dept: RADIATION ONCOLOGY | Facility: HOSPITAL | Age: 58
Discharge: HOME | End: 2023-10-31

## 2023-10-31 VITALS
RESPIRATION RATE: 20 BRPM | TEMPERATURE: 97.9 F | OXYGEN SATURATION: 98 % | SYSTOLIC BLOOD PRESSURE: 149 MMHG | DIASTOLIC BLOOD PRESSURE: 87 MMHG | BODY MASS INDEX: 29.62 KG/M2 | WEIGHT: 186.51 LBS | HEART RATE: 94 BPM

## 2023-10-31 DIAGNOSIS — C15.5 MALIGNANT NEOPLASM OF LOWER THIRD OF ESOPHAGUS: Primary | ICD-10-CM

## 2023-10-31 LAB
RAD ONC ARIA COURSE ID: NORMAL
RAD ONC ARIA COURSE INTENT: NORMAL
RAD ONC ARIA COURSE LAST TREATMENT DATE: NORMAL
RAD ONC ARIA COURSE START DATE: NORMAL
RAD ONC ARIA COURSE TREATMENT ELAPSED DAYS: 15
RAD ONC ARIA FIRST TREATMENT DATE: NORMAL
RAD ONC ARIA PLAN FRACTIONS TREATED TO DATE: 8
RAD ONC ARIA PLAN ID: NORMAL
RAD ONC ARIA PLAN PRESCRIBED DOSE PER FRACTION: 1.8 GY
RAD ONC ARIA PLAN PRIMARY REFERENCE POINT: NORMAL
RAD ONC ARIA PLAN TOTAL FRACTIONS PRESCRIBED: 25
RAD ONC ARIA PLAN TOTAL PRESCRIBED DOSE: 4500 CGY
RAD ONC ARIA REFERENCE POINT DOSAGE GIVEN TO DATE: 14.4 GY
RAD ONC ARIA REFERENCE POINT ID: NORMAL
RAD ONC ARIA REFERENCE POINT SESSION DOSAGE GIVEN: 1.8 GY

## 2023-10-31 PROCEDURE — 77386: CPT | Performed by: RADIOLOGY

## 2023-10-31 NOTE — PROGRESS NOTES
On Treatment Visit       Patient: Shashank Saunders   YOB: 1965   Medical Record Number: [id number]     Date of Visit  10/17/2023  Primary Diagnosis:Malignant neoplasm of lower third of esophagus [C15.5]  Cancer Staging: Cancer Staging   Malignant neoplasm of lower third of esophagus  Staging form: Esophagus - Squamous Cell Carcinoma, AJCC 8th Edition  - Clinical: Stage II (cT2, cN0, cM0) - Signed by Winston Johnson MD on 10/5/2023         was seen today for an on treatment visit.  He is receiving radiation therapy to the esophagus. He  has received 1440 cGy in 8 fractions out of a planned dose of 5040 cGy in 28 fractions. He is currently receiving concurrent carboplatin/paclitaxel chemotherapy per Dr. Johnson.     Persistent difficulty swallowing food.  Still has productive cough.                                            Review of Systems:   Review of Systems   Constitutional:  Positive for appetite change (Unable to eat due to difficulty swallowing/food comes back up.  Able to eat soft foods.) and fatigue (8/10). Negative for unexpected weight change.   HENT:  Positive for trouble swallowing (States that certain foods are difficult to go down, comes back up.). Negative for hearing loss, sore throat, tinnitus and voice change.    Eyes:  Negative for discharge and visual disturbance.   Respiratory:  Positive for cough (Occasionally productive dx with flu recently, productive with green secretions.). Negative for chest tightness, shortness of breath and wheezing.    Cardiovascular:  Negative for chest pain, palpitations and leg swelling.   Gastrointestinal:  Positive for vomiting (When food gets stuck and comes back up.). Negative for anal bleeding, blood in stool, constipation, diarrhea and nausea.   Genitourinary:  Negative for difficulty urinating, dysuria, frequency, hematuria and urgency.   Musculoskeletal:  Positive for arthralgias (Generalized, recently dx with flu.  Recent pain in  left shoulder, burning sensation, pain comes and goes, newer) and back pain (New). Negative for joint swelling.   Skin:  Positive for color change (Bruising noted to left chest, s/p port placement. Improvement). Negative for rash.   Neurological:  Positive for weakness (Generalized) and headaches (Occasional, different times of the day.). Negative for dizziness and numbness.   Psychiatric/Behavioral:  Positive for sleep disturbance (Due to pain, takes meds with some relief.).        Vitals:     Vitals:    10/31/23 1603   BP: 149/87   Pulse: 94   Resp: 20   Temp: 97.9 °F (36.6 °C)   SpO2: 98%       Weight:   Wt Readings from Last 3 Encounters:   10/31/23 84.6 kg (186 lb 8.2 oz)   10/30/23 83.7 kg (184 lb 8.4 oz)   10/30/23 83.7 kg (184 lb 8.4 oz)      Pain:    Pain Score    10/31/23 1603   PainSc:   8   PainLoc: Teeth         Physical Exam:  Gen: WD/WN; NAD  HEENT: MMM  Trachea: midline  Chest: symmetric  Resp: normal respiratory effort  Extr: warm, well-perfused  Neuro: awake and alert; no aphasia or neglect    Plan: I have reviewed treatment setup notes, checked and approved the daily guidance images.  I reviewed dose delivery, treatment parameters and deemed them appropriate. We plan to continue radiation therapy as prescribed.    Reports severe oral cavity pain due to poor dentition; history of dental abscess      Radiation Oncology    Electronically signed by Javy George MD 10/31/2023  21:09 EDT

## 2023-11-01 ENCOUNTER — HOSPITAL ENCOUNTER (OUTPATIENT)
Dept: RADIATION ONCOLOGY | Facility: HOSPITAL | Age: 58
Setting detail: RADIATION/ONCOLOGY SERIES
End: 2023-11-01
Payer: MEDICARE

## 2023-11-01 ENCOUNTER — APPOINTMENT (OUTPATIENT)
Dept: RADIATION ONCOLOGY | Facility: HOSPITAL | Age: 58
End: 2023-11-01
Payer: MEDICARE

## 2023-11-01 ENCOUNTER — DOCUMENTATION (OUTPATIENT)
Dept: NUTRITION | Facility: HOSPITAL | Age: 58
End: 2023-11-01
Payer: MEDICARE

## 2023-11-01 ENCOUNTER — HOSPITAL ENCOUNTER (OUTPATIENT)
Dept: RADIATION ONCOLOGY | Facility: HOSPITAL | Age: 58
Discharge: HOME | End: 2023-11-01

## 2023-11-01 VITALS — BODY MASS INDEX: 29.52 KG/M2 | WEIGHT: 185.85 LBS

## 2023-11-01 DIAGNOSIS — K02.9 TEETH DECAYED: Primary | ICD-10-CM

## 2023-11-01 LAB
RAD ONC ARIA COURSE ID: NORMAL
RAD ONC ARIA COURSE INTENT: NORMAL
RAD ONC ARIA COURSE LAST TREATMENT DATE: NORMAL
RAD ONC ARIA COURSE START DATE: NORMAL
RAD ONC ARIA COURSE TREATMENT ELAPSED DAYS: 16
RAD ONC ARIA FIRST TREATMENT DATE: NORMAL
RAD ONC ARIA PLAN FRACTIONS TREATED TO DATE: 9
RAD ONC ARIA PLAN ID: NORMAL
RAD ONC ARIA PLAN PRESCRIBED DOSE PER FRACTION: 1.8 GY
RAD ONC ARIA PLAN PRIMARY REFERENCE POINT: NORMAL
RAD ONC ARIA PLAN TOTAL FRACTIONS PRESCRIBED: 25
RAD ONC ARIA PLAN TOTAL PRESCRIBED DOSE: 4500 CGY
RAD ONC ARIA REFERENCE POINT DOSAGE GIVEN TO DATE: 16.2 GY
RAD ONC ARIA REFERENCE POINT ID: NORMAL
RAD ONC ARIA REFERENCE POINT SESSION DOSAGE GIVEN: 1.8 GY

## 2023-11-01 PROCEDURE — 77014 CHG CT GUIDANCE RADIATION THERAPY FLDS PLACEMENT: CPT | Performed by: RADIOLOGY

## 2023-11-01 PROCEDURE — 77386: CPT | Performed by: RADIOLOGY

## 2023-11-01 NOTE — PROGRESS NOTES
"Outpatient Nutrition Oncology Follow Up    Patient Name: Shashank Saunders  YOB: 1965  MRN: [id number]    Recommendation(s):   High kcal, high protein small / frequent meals of only soft, moist foods.  Boost VHC TID or Boost VHC made as a milkshake TID.  Adequate fluids.      Reason for Consult:  Radiation Tx F/U       Today's Weight:  84.3 kg    Weight Change:   1.4% loss in 1 week  Pt has gained 1# in the past 2 days  13% total wt decline in 3 months (significant)    Nutrition-related concerns: Dysphagia/odynophagia, emesis due to esophageal dysphagia, decreased appetite    Current PO intake: Eating small / frequent amounts of food (tries to eat soft / moist foods, but able to eat some regular consistency foods at times)     Current Nutrition Supplement intake: Wife prepares high kcal / high protein milkshakes made with Ensure Complete (350 kcals, 30 gm protein) as the shake base     Consult or Intervention:  Rate of wt decline slowing down.  Pt still making a good effort to eat solid food.  Continues to experience emesis with certain \"dry\" foods, such as chicken.  Encouraged to add moisture to all foods and not to consume any foods that are the least bit aggravating to the esophagus.  Pt provided the information on supplement Boost VHC to wife- have not purchased yet.  Explained the benefit of consuming the Boost VHC vs Ensure Complete.  Provided more samples of Ensure Complete today.    Nutrition Diagnosis: Unintentional weight loss related to increased nutrient needs due to catabolic disease as evidenced by physiological causes increasing nutrient needs., hypermetabolic state., decreased appetite., and patient report.    Plan: Will follow up per oncology nutrition protocol             "

## 2023-11-02 ENCOUNTER — HOSPITAL ENCOUNTER (OUTPATIENT)
Dept: RADIATION ONCOLOGY | Facility: HOSPITAL | Age: 58
Discharge: HOME | End: 2023-11-02

## 2023-11-02 LAB
RAD ONC ARIA COURSE ID: NORMAL
RAD ONC ARIA COURSE INTENT: NORMAL
RAD ONC ARIA COURSE LAST TREATMENT DATE: NORMAL
RAD ONC ARIA COURSE START DATE: NORMAL
RAD ONC ARIA COURSE TREATMENT ELAPSED DAYS: 17
RAD ONC ARIA FIRST TREATMENT DATE: NORMAL
RAD ONC ARIA PLAN FRACTIONS TREATED TO DATE: 10
RAD ONC ARIA PLAN ID: NORMAL
RAD ONC ARIA PLAN PRESCRIBED DOSE PER FRACTION: 1.8 GY
RAD ONC ARIA PLAN PRIMARY REFERENCE POINT: NORMAL
RAD ONC ARIA PLAN TOTAL FRACTIONS PRESCRIBED: 25
RAD ONC ARIA PLAN TOTAL PRESCRIBED DOSE: 4500 CGY
RAD ONC ARIA REFERENCE POINT DOSAGE GIVEN TO DATE: 18 GY
RAD ONC ARIA REFERENCE POINT ID: NORMAL
RAD ONC ARIA REFERENCE POINT SESSION DOSAGE GIVEN: 1.8 GY

## 2023-11-02 PROCEDURE — 77336 RADIATION PHYSICS CONSULT: CPT | Performed by: RADIOLOGY

## 2023-11-02 PROCEDURE — 77014 CHG CT GUIDANCE RADIATION THERAPY FLDS PLACEMENT: CPT | Performed by: RADIOLOGY

## 2023-11-02 PROCEDURE — 77386: CPT | Performed by: RADIOLOGY

## 2023-11-03 ENCOUNTER — HOSPITAL ENCOUNTER (OUTPATIENT)
Dept: RADIATION ONCOLOGY | Facility: HOSPITAL | Age: 58
Discharge: HOME | End: 2023-11-03

## 2023-11-03 LAB
RAD ONC ARIA COURSE ID: NORMAL
RAD ONC ARIA COURSE INTENT: NORMAL
RAD ONC ARIA COURSE LAST TREATMENT DATE: NORMAL
RAD ONC ARIA COURSE START DATE: NORMAL
RAD ONC ARIA COURSE TREATMENT ELAPSED DAYS: 18
RAD ONC ARIA FIRST TREATMENT DATE: NORMAL
RAD ONC ARIA PLAN FRACTIONS TREATED TO DATE: 11
RAD ONC ARIA PLAN ID: NORMAL
RAD ONC ARIA PLAN PRESCRIBED DOSE PER FRACTION: 1.8 GY
RAD ONC ARIA PLAN PRIMARY REFERENCE POINT: NORMAL
RAD ONC ARIA PLAN TOTAL FRACTIONS PRESCRIBED: 25
RAD ONC ARIA PLAN TOTAL PRESCRIBED DOSE: 4500 CGY
RAD ONC ARIA REFERENCE POINT DOSAGE GIVEN TO DATE: 19.8 GY
RAD ONC ARIA REFERENCE POINT ID: NORMAL
RAD ONC ARIA REFERENCE POINT SESSION DOSAGE GIVEN: 1.8 GY

## 2023-11-03 PROCEDURE — 77014 CHG CT GUIDANCE RADIATION THERAPY FLDS PLACEMENT: CPT | Performed by: RADIOLOGY

## 2023-11-03 PROCEDURE — 77386: CPT | Performed by: RADIOLOGY

## 2023-11-03 PROCEDURE — 77427 RADIATION TX MANAGEMENT X5: CPT | Performed by: RADIOLOGY

## 2023-11-06 ENCOUNTER — HOSPITAL ENCOUNTER (OUTPATIENT)
Dept: ONCOLOGY | Facility: HOSPITAL | Age: 58
Discharge: HOME | End: 2023-11-06
Admitting: INTERNAL MEDICINE
Payer: MEDICARE

## 2023-11-06 ENCOUNTER — OFFICE VISIT (OUTPATIENT)
Dept: ONCOLOGY | Facility: HOSPITAL | Age: 58
End: 2023-11-06
Payer: MEDICARE

## 2023-11-06 ENCOUNTER — HOSPITAL ENCOUNTER (OUTPATIENT)
Dept: RADIATION ONCOLOGY | Facility: HOSPITAL | Age: 58
Discharge: HOME | End: 2023-11-06
Payer: MEDICARE

## 2023-11-06 VITALS
DIASTOLIC BLOOD PRESSURE: 99 MMHG | WEIGHT: 187.39 LBS | HEIGHT: 67 IN | TEMPERATURE: 98.3 F | BODY MASS INDEX: 29.41 KG/M2 | HEART RATE: 66 BPM | SYSTOLIC BLOOD PRESSURE: 168 MMHG | RESPIRATION RATE: 17 BRPM | OXYGEN SATURATION: 99 %

## 2023-11-06 VITALS
TEMPERATURE: 98.3 F | BODY MASS INDEX: 29.41 KG/M2 | HEART RATE: 82 BPM | RESPIRATION RATE: 17 BRPM | SYSTOLIC BLOOD PRESSURE: 163 MMHG | HEIGHT: 67 IN | OXYGEN SATURATION: 99 % | WEIGHT: 187.39 LBS | DIASTOLIC BLOOD PRESSURE: 92 MMHG

## 2023-11-06 DIAGNOSIS — C15.5 MALIGNANT NEOPLASM OF LOWER THIRD OF ESOPHAGUS: Primary | ICD-10-CM

## 2023-11-06 DIAGNOSIS — I63.9 CEREBROVASCULAR ACCIDENT (CVA), UNSPECIFIED MECHANISM: ICD-10-CM

## 2023-11-06 DIAGNOSIS — Z45.2 ENCOUNTER FOR ADJUSTMENT OR MANAGEMENT OF VASCULAR ACCESS DEVICE: ICD-10-CM

## 2023-11-06 DIAGNOSIS — G89.3 NEOPLASM RELATED PAIN: ICD-10-CM

## 2023-11-06 LAB
ALBUMIN SERPL-MCNC: 3.8 G/DL (ref 3.5–5.2)
ALBUMIN/GLOB SERPL: 1.7 G/DL
ALP SERPL-CCNC: 57 U/L (ref 39–117)
ALT SERPL W P-5'-P-CCNC: 7 U/L (ref 1–41)
ANION GAP SERPL CALCULATED.3IONS-SCNC: 4.5 MMOL/L (ref 5–15)
AST SERPL-CCNC: 11 U/L (ref 1–40)
BASOPHILS # BLD AUTO: 0.02 10*3/MM3 (ref 0–0.2)
BASOPHILS NFR BLD AUTO: 0.3 % (ref 0–1.5)
BILIRUB SERPL-MCNC: 0.3 MG/DL (ref 0–1.2)
BUN SERPL-MCNC: 12 MG/DL (ref 6–20)
BUN/CREAT SERPL: 17.9 (ref 7–25)
CALCIUM SPEC-SCNC: 8.7 MG/DL (ref 8.6–10.5)
CHLORIDE SERPL-SCNC: 105 MMOL/L (ref 98–107)
CO2 SERPL-SCNC: 30.5 MMOL/L (ref 22–29)
CREAT SERPL-MCNC: 0.67 MG/DL (ref 0.76–1.27)
DEPRECATED RDW RBC AUTO: 46.4 FL (ref 37–54)
EGFRCR SERPLBLD CKD-EPI 2021: 107.6 ML/MIN/1.73
EOSINOPHIL # BLD AUTO: 0.51 10*3/MM3 (ref 0–0.4)
EOSINOPHIL NFR BLD AUTO: 7.9 % (ref 0.3–6.2)
ERYTHROCYTE [DISTWIDTH] IN BLOOD BY AUTOMATED COUNT: 13.6 % (ref 12.3–15.4)
GLOBULIN UR ELPH-MCNC: 2.2 GM/DL
GLUCOSE SERPL-MCNC: 89 MG/DL (ref 65–99)
HCT VFR BLD AUTO: 41.4 % (ref 37.5–51)
HGB BLD-MCNC: 13.9 G/DL (ref 13–17.7)
IMM GRANULOCYTES # BLD AUTO: 0.03 10*3/MM3 (ref 0–0.05)
IMM GRANULOCYTES NFR BLD AUTO: 0.5 % (ref 0–0.5)
LYMPHOCYTES # BLD AUTO: 1.05 10*3/MM3 (ref 0.7–3.1)
LYMPHOCYTES NFR BLD AUTO: 16.3 % (ref 19.6–45.3)
MCH RBC QN AUTO: 30.8 PG (ref 26.6–33)
MCHC RBC AUTO-ENTMCNC: 33.6 G/DL (ref 31.5–35.7)
MCV RBC AUTO: 91.6 FL (ref 79–97)
MONOCYTES # BLD AUTO: 0.49 10*3/MM3 (ref 0.1–0.9)
MONOCYTES NFR BLD AUTO: 7.6 % (ref 5–12)
NEUTROPHILS NFR BLD AUTO: 4.36 10*3/MM3 (ref 1.7–7)
NEUTROPHILS NFR BLD AUTO: 67.4 % (ref 42.7–76)
PLATELET # BLD AUTO: 191 10*3/MM3 (ref 140–450)
PMV BLD AUTO: 10.8 FL (ref 6–12)
POTASSIUM SERPL-SCNC: 3.7 MMOL/L (ref 3.5–5.2)
PROT SERPL-MCNC: 6 G/DL (ref 6–8.5)
RAD ONC ARIA COURSE ID: NORMAL
RAD ONC ARIA COURSE INTENT: NORMAL
RAD ONC ARIA COURSE LAST TREATMENT DATE: NORMAL
RAD ONC ARIA COURSE START DATE: NORMAL
RAD ONC ARIA COURSE TREATMENT ELAPSED DAYS: 21
RAD ONC ARIA FIRST TREATMENT DATE: NORMAL
RAD ONC ARIA PLAN FRACTIONS TREATED TO DATE: 12
RAD ONC ARIA PLAN ID: NORMAL
RAD ONC ARIA PLAN PRESCRIBED DOSE PER FRACTION: 1.8 GY
RAD ONC ARIA PLAN PRIMARY REFERENCE POINT: NORMAL
RAD ONC ARIA PLAN TOTAL FRACTIONS PRESCRIBED: 25
RAD ONC ARIA PLAN TOTAL PRESCRIBED DOSE: 4500 CGY
RAD ONC ARIA REFERENCE POINT DOSAGE GIVEN TO DATE: 21.6 GY
RAD ONC ARIA REFERENCE POINT ID: NORMAL
RAD ONC ARIA REFERENCE POINT SESSION DOSAGE GIVEN: 1.8 GY
RBC # BLD AUTO: 4.52 10*6/MM3 (ref 4.14–5.8)
SODIUM SERPL-SCNC: 140 MMOL/L (ref 136–145)
WBC NRBC COR # BLD: 6.46 10*3/MM3 (ref 3.4–10.8)

## 2023-11-06 PROCEDURE — 25810000003 SODIUM CHLORIDE 0.9 % SOLUTION: Performed by: INTERNAL MEDICINE

## 2023-11-06 PROCEDURE — 96417 CHEMO IV INFUS EACH ADDL SEQ: CPT

## 2023-11-06 PROCEDURE — 77014 CHG CT GUIDANCE RADIATION THERAPY FLDS PLACEMENT: CPT | Performed by: RADIOLOGY

## 2023-11-06 PROCEDURE — 25010000002 CARBOPLATIN PER 50 MG: Performed by: INTERNAL MEDICINE

## 2023-11-06 PROCEDURE — 25010000002 DIPHENHYDRAMINE PER 50 MG: Performed by: INTERNAL MEDICINE

## 2023-11-06 PROCEDURE — 96413 CHEMO IV INFUSION 1 HR: CPT

## 2023-11-06 PROCEDURE — 25810000003 SODIUM CHLORIDE 0.9 % SOLUTION 250 ML FLEX CONT: Performed by: INTERNAL MEDICINE

## 2023-11-06 PROCEDURE — 80053 COMPREHEN METABOLIC PANEL: CPT | Performed by: INTERNAL MEDICINE

## 2023-11-06 PROCEDURE — 25010000002 PACLITAXEL PER 1 MG: Performed by: INTERNAL MEDICINE

## 2023-11-06 PROCEDURE — 25010000002 HEPARIN LOCK FLUSH PER 10 UNITS: Performed by: INTERNAL MEDICINE

## 2023-11-06 PROCEDURE — 96375 TX/PRO/DX INJ NEW DRUG ADDON: CPT

## 2023-11-06 PROCEDURE — 85025 COMPLETE CBC W/AUTO DIFF WBC: CPT | Performed by: INTERNAL MEDICINE

## 2023-11-06 PROCEDURE — 77386: CPT | Performed by: RADIOLOGY

## 2023-11-06 PROCEDURE — 25010000002 PALONOSETRON PER 25 MCG: Performed by: INTERNAL MEDICINE

## 2023-11-06 PROCEDURE — 25010000002 DEXAMETHASONE SODIUM PHOSPHATE 120 MG/30ML SOLUTION: Performed by: INTERNAL MEDICINE

## 2023-11-06 RX ORDER — PALONOSETRON 0.05 MG/ML
0.25 INJECTION, SOLUTION INTRAVENOUS ONCE
Status: COMPLETED | OUTPATIENT
Start: 2023-11-06 | End: 2023-11-06

## 2023-11-06 RX ORDER — FAMOTIDINE 10 MG/ML
20 INJECTION, SOLUTION INTRAVENOUS ONCE
Status: COMPLETED | OUTPATIENT
Start: 2023-11-06 | End: 2023-11-06

## 2023-11-06 RX ORDER — SODIUM CHLORIDE 0.9 % (FLUSH) 0.9 %
20 SYRINGE (ML) INJECTION AS NEEDED
Status: DISCONTINUED | OUTPATIENT
Start: 2023-11-06 | End: 2023-11-07 | Stop reason: HOSPADM

## 2023-11-06 RX ORDER — SODIUM CHLORIDE 0.9 % (FLUSH) 0.9 %
20 SYRINGE (ML) INJECTION AS NEEDED
Status: CANCELLED | OUTPATIENT
Start: 2023-11-06

## 2023-11-06 RX ORDER — HEPARIN SODIUM (PORCINE) LOCK FLUSH IV SOLN 100 UNIT/ML 100 UNIT/ML
500 SOLUTION INTRAVENOUS AS NEEDED
Status: CANCELLED | OUTPATIENT
Start: 2023-11-13

## 2023-11-06 RX ORDER — FAMOTIDINE 10 MG/ML
20 INJECTION, SOLUTION INTRAVENOUS ONCE
Status: CANCELLED | OUTPATIENT
Start: 2023-11-06

## 2023-11-06 RX ORDER — SODIUM CHLORIDE 9 MG/ML
250 INJECTION, SOLUTION INTRAVENOUS ONCE
Status: COMPLETED | OUTPATIENT
Start: 2023-11-06 | End: 2023-11-06

## 2023-11-06 RX ORDER — PALONOSETRON 0.05 MG/ML
0.25 INJECTION, SOLUTION INTRAVENOUS ONCE
Status: CANCELLED | OUTPATIENT
Start: 2023-11-06

## 2023-11-06 RX ORDER — FAMOTIDINE 10 MG/ML
20 INJECTION, SOLUTION INTRAVENOUS AS NEEDED
Status: CANCELLED | OUTPATIENT
Start: 2023-11-06

## 2023-11-06 RX ORDER — DIPHENHYDRAMINE HYDROCHLORIDE 50 MG/ML
50 INJECTION INTRAMUSCULAR; INTRAVENOUS AS NEEDED
Status: CANCELLED | OUTPATIENT
Start: 2023-11-06

## 2023-11-06 RX ORDER — HEPARIN SODIUM (PORCINE) LOCK FLUSH IV SOLN 100 UNIT/ML 100 UNIT/ML
500 SOLUTION INTRAVENOUS AS NEEDED
Status: DISCONTINUED | OUTPATIENT
Start: 2023-11-06 | End: 2023-11-07 | Stop reason: HOSPADM

## 2023-11-06 RX ORDER — SODIUM CHLORIDE 9 MG/ML
250 INJECTION, SOLUTION INTRAVENOUS ONCE
Status: CANCELLED | OUTPATIENT
Start: 2023-11-06

## 2023-11-06 RX ADMIN — PACLITAXEL 105 MG: 6 INJECTION, SOLUTION, CONCENTRATE INTRAVENOUS at 11:22

## 2023-11-06 RX ADMIN — HEPARIN SODIUM (PORCINE) LOCK FLUSH IV SOLN 100 UNIT/ML 500 UNITS: 100 SOLUTION at 13:19

## 2023-11-06 RX ADMIN — Medication 20 ML: at 13:19

## 2023-11-06 RX ADMIN — PALONOSETRON 0.25 MG: 0.05 INJECTION, SOLUTION INTRAVENOUS at 10:15

## 2023-11-06 RX ADMIN — CARBOPLATIN 300 MG: 600 INJECTION, SOLUTION INTRAVENOUS at 12:38

## 2023-11-06 RX ADMIN — FAMOTIDINE 20 MG: 10 INJECTION INTRAVENOUS at 10:36

## 2023-11-06 RX ADMIN — DIPHENHYDRAMINE HYDROCHLORIDE 25 MG: 50 INJECTION, SOLUTION INTRAMUSCULAR; INTRAVENOUS at 10:37

## 2023-11-06 RX ADMIN — SODIUM CHLORIDE 250 ML: 9 INJECTION, SOLUTION INTRAVENOUS at 10:15

## 2023-11-06 RX ADMIN — DEXAMETHASONE SODIUM PHOSPHATE 12 MG: 4 INJECTION, SOLUTION INTRA-ARTICULAR; INTRALESIONAL; INTRAMUSCULAR; INTRAVENOUS; SOFT TISSUE at 10:16

## 2023-11-06 NOTE — PROGRESS NOTES
Chief Complaint  Malignant neoplasm of lower third of esophagus    Shyanne Grijalva, Shyanne Woods, LAUREN    Subjective          Shashank Saunders presents to [facility]Y [facility]OGY for GEJ squamous cell carcinoma    Mr. 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  Patient has been started on chemoradiation. He is due for Cycle 3 of chemotherapy with weekly Carboplatin and Paclitaxel. He reports eating well but he does regurgitate solid foods often. He is drinking protein shakes. Weight stable. Pain improved. No diarrhea or constipation. No further episodes of altered mentation. Tolerating radiation well. No fevers, chill, infections reported.       Oncology/Hematology History Overview Note   9/8/23: Presented to [facility] ED with inability to tolerate solids and 40 lb weight loss. CT Chest showed CT scan of the chest with IV contrast demonstrating moderately dilated fluid-filled esophagus. Emphysema. Part solid opacity 6 mm in the right upper lobe.     9/9/23: EGD performed showing an ulcerated and irregular severe stenosis in the lower third of the esophagus, that was dilated and biopsied. GEJ biopsy showing poorly differentiated squamous cell carcinoma.     10/3/23: NM PET: 1.8 cm hypermetabolic mass in the distal esophagus near the GE junction consistent with the patient's history of esophageal carcinoma.  No evidence of metastatic disease.    10/16/23: C1D1 Carboplatin and Paclitaxel. D1 of XRT.      Cycle 2 delayed due to flu. 10/30/23: C2D1 Carbo/Paclitaxel     Malignant neoplasm of lower third of esophagus   9/14/2023 Initial Diagnosis    Malignant neoplasm of lower third of esophagus     10/5/2023 Cancer Staged     Staging form: Esophagus - Squamous Cell Carcinoma, AJCC 8th Edition  - Clinical: Stage II (cT2, cN0, cM0) - Signed by Winston Johnson MD on 10/5/2023     10/5/2023 -  Radiation    RADIATION THERAPY Treatment Details (Noted on 10/5/2023)  Site: Esophagus - Lower  Technique: IMRT  Goal: No goal specified  Planned Treatment Start Date: No planned start date specified     10/16/2023 -  Chemotherapy    OP GE PACLitaxel / CARBOplatin (weekly) + XRT           Review of Systems   Constitutional:  Positive for appetite change (Decreased) and fatigue. Negative for diaphoresis, fever, unexpected weight gain and unexpected weight loss.   HENT:  Positive for sore throat and trouble swallowing. Negative for hearing loss, mouth sores, swollen glands and voice change.    Eyes:  Negative for blurred vision.   Respiratory:  Negative for cough, shortness of breath and wheezing.    Cardiovascular:  Negative for chest pain and palpitations.   Gastrointestinal:  Negative for abdominal pain, blood in stool, constipation, diarrhea, nausea and vomiting.   Endocrine: Negative for cold intolerance and heat intolerance.   Genitourinary:  Positive for flank pain (RT side). Negative for difficulty urinating, dysuria, frequency, hematuria and urinary incontinence.   Musculoskeletal:  Positive for arthralgias and back pain. Negative for myalgias.   Skin:  Negative for rash, skin lesions and wound.   Neurological:  Positive for weakness. Negative for dizziness, seizures, numbness and headache.   Hematological:  Does not bruise/bleed easily.   Psychiatric/Behavioral:  Negative for depressed mood. The patient is nervous/anxious.    All other systems reviewed and are negative.    Current Outpatient Medications on File Prior to Visit   Medication Sig Dispense Refill    albuterol (PROVENTIL) (2.5 MG/3ML) 0.083% nebulizer solution Inhale 2.5 mg.      aspirin 81 MG EC tablet Take 1 tablet by mouth Daily. 90 tablet 3    atorvastatin (LIPITOR) 40  MG tablet Take 1 tablet by mouth Every Night. 30 tablet 5    benzonatate (Tessalon Perles) 100 MG capsule Take 1 capsule by mouth 3 (Three) Times a Day As Needed for Cough. 90 capsule 2    carvedilol (COREG) 12.5 MG tablet Take 1 tablet by mouth Every 12 (Twelve) Hours.      Eliquis 5 MG tablet tablet Take 1 tablet by mouth Every 12 (Twelve) Hours.      lisinopril (PRINIVIL,ZESTRIL) 10 MG tablet Take 1 tablet by mouth Daily.      metFORMIN (GLUCOPHAGE) 500 MG tablet Take 1 tablet by mouth 2 (Two) Times a Day With Meals.      ondansetron (ZOFRAN) 8 MG tablet Take 1 tablet by mouth 3 (Three) Times a Day As Needed for Nausea or Vomiting. 30 tablet 5    ondansetron ODT (ZOFRAN-ODT) 4 MG disintegrating tablet Place 1 tablet on the tongue Every 8 (Eight) Hours As Needed.      oxyCODONE-acetaminophen (PERCOCET) 5-325 MG per tablet Take 2 tablets by mouth Every 6 (Six) Hours As Needed for Moderate Pain. 120 tablet 0    pantoprazole (PROTONIX) 40 MG EC tablet Take 1 tablet by mouth 2 (Two) Times a Day Before Meals. 60 tablet 2    QUEtiapine (SEROquel) 200 MG tablet Take 1 tablet by mouth Every Night.      sucralfate (CARAFATE) 1 g tablet Take 1 tablet by mouth 3 (Three) Times a Day Before Meals. 90 tablet 1     No current facility-administered medications on file prior to visit.       No Known Allergies  Past Medical History:   Diagnosis Date    Asthma     Congestive heart failure (CHF)     Coronary artery disease     Diabetes mellitus     Esophageal cancer     Heart attack     Hypertension      Past Surgical History:   Procedure Laterality Date    CARDIAC CATHETERIZATION      CARDIAC SURGERY      Stent x1    ENDOSCOPY N/A 9/9/2023    Procedure: ESOPHAGOGASTRODUODENOSCOPY WITH ESOPHAGEAL BALLOON DILATATION/BIOPSIES;  Surgeon: Gary Stein MD;  Location: [facility] ENDOSCOPY;  Service: Gastroenterology;  Laterality: N/A;  ULCERATED STRICTURE OF GE JUNCTION    VENOUS ACCESS DEVICE (PORT) INSERTION Left 10/12/2023     "Procedure: INSERTION VENOUS ACCESS DEVICE;  Surgeon: Yung Montejo MD;  Location: [facility] MAIN OR;  Service: General;  Laterality: Left;     Social History     Socioeconomic History    Marital status:    Tobacco Use    Smoking status: Every Day     Packs/day: .5     Types: Cigarettes     Start date:     Smokeless tobacco: Never   Vaping Use    Vaping Use: Never used   Substance and Sexual Activity    Alcohol use: Not Currently    Drug use: Not Currently     Types: Cocaine(coke), Marijuana    Sexual activity: Defer     History reviewed. No pertinent family history.    Objective   Physical Exam  Well appearing patient in no acute distress on RA  Anicteric sclerae, no rash on exposed skin  Respirations non-labored  + dupuytren's contracture left hand  Awake, alert, and oriented x 4. Speech intact. No gross neurologic deficit  Appropriate mood and affect    ECO    Vitals:    23 0903   BP: 168/99   Pulse: 66   Resp: 17   Temp: 98.3 °F (36.8 °C)   TempSrc: Temporal   SpO2: 99%   Weight: 85 kg (187 lb 6.3 oz)   Height: 169 cm (66.54\")   PainSc:   6   PainLoc: Throat                   PHQ-9 Total Score:           Result Review :   The following data was reviewed by: Winston Johnson MD on 23:  Lab Results   Component Value Date    HGB 13.9 2023    HCT 41.4 2023    MCV 91.6 2023     2023    WBC 6.46 2023    NEUTROABS 4.36 2023    LYMPHSABS 1.05 2023    MONOSABS 0.49 2023    EOSABS 0.51 (H) 2023    BASOSABS 0.02 2023     Lab Results   Component Value Date    GLUCOSE 89 2023    BUN 12 2023    CREATININE 0.67 (L) 2023     2023    K 3.7 2023     2023    CO2 30.5 (H) 2023    CALCIUM 8.7 2023    PROTEINTOT 6.0 2023    ALBUMIN 3.8 2023    BILITOT 0.3 2023    ALKPHOS 57 2023    AST 11 2023    ALT 7 2023     Lab Results   Component Value " Date    MG 1.8 09/09/2023     Labs personally reviewed and essentially normal. Creatinine low.     10/17/23 Rad onc note personally reviewed    XR Chest Post CVA Port    Result Date: 10/12/2023    1. Tip of the left subclavian central venous port catheter terminates in the upper SVC. 2. No visible pneumothorax.       TANVIR VEGA MD       Electronically Signed and Approved By: TANVIR VEGA MD on 10/12/2023 at 21:09                  Assessment and Plan    Diagnoses and all orders for this visit:    1. Malignant neoplasm of lower third of esophagus (Primary)    2. Neoplasm related pain    3. Cerebrovascular accident (CVA), unspecified mechanism    Other orders  -     Cancel: sodium chloride 0.9 % infusion 250 mL  -     Cancel: famotidine (PEPCID) injection 20 mg  -     Cancel: diphenhydrAMINE (BENADRYL) IVPB 25 mg  -     Cancel: palonosetron (ALOXI) injection 0.25 mg  -     Cancel: dexAMETHasone (DECADRON) 12 mg in sodium chloride 0.9 % IVPB  -     Cancel: PACLitaxel (TAXOL) 105 mg in sodium chloride 0.9 % 267.5 mL chemo IVPB  -     Cancel: CARBOplatin (PARAPLATIN) 250 mg in sodium chloride 0.9 % 125 mL chemo IVPB  -     Hydrocortisone Sod Suc (PF) (Solu-CORTEF) injection 100 mg  -     diphenhydrAMINE (BENADRYL) injection 50 mg  -     famotidine (PEPCID) injection 20 mg      GEJ squamous cell carcinoma  CT Chest without any metastatic disease, PET scan 10/3/23 for complete staging confirmed localized disease.  He has been seen by Dr. Chau with thoracic surgery and Dr. George with radiation oncology. Local disease treated with neoadjuvant chemoradiation followed by consideration for surgery. Chemotherapy with Carboplatin and Paclitaxel given weekly. First dose 10/16/23. PRN anti-emetics provided.     11/6/23: C3D1 Carboplatin and Paclitaxel. Labs appropriate to proceed      Neoplasm related pain  Continue Percocet 5-325 mg, two pills q6h PRN. Continue PRN NSAID.      CVA  10/5/23 MRI brain performed due to  concern for seizure and showed likely bilateral subacute infarcts. Increased atorvastatin to 40 mg daily. Continue ASA 81 mg daily. Patient already on eliquis.     This is an acute or chronic illness that poses a threat to life or bodily function. The above treatment plan involves a high risk of complications and/or mortality of patient management. Continue to monitor for severe toxicities with frequent labs and office visits.      I spent 25 minutes caring for Shashank on this date of service. This time includes time spent by me in the following activities:preparing for the visit, reviewing tests, obtaining and/or reviewing a separately obtained history, performing a medically appropriate examination and/or evaluation , counseling and educating the patient/family/caregiver, ordering medications, tests, or procedures, referring and communicating with other health care professionals , documenting information in the medical record, independently interpreting results and communicating that information with the patient/family/caregiver, and care coordination    Patient Follow Up: Cycle 4 chemo  Patient was given instructions and counseling regarding his condition or for health maintenance advice. Please see specific information pulled into the AVS if appropriate.

## 2023-11-07 ENCOUNTER — HOSPITAL ENCOUNTER (OUTPATIENT)
Dept: RADIATION ONCOLOGY | Facility: HOSPITAL | Age: 58
Discharge: HOME | End: 2023-11-07

## 2023-11-07 VITALS
SYSTOLIC BLOOD PRESSURE: 139 MMHG | WEIGHT: 190.48 LBS | BODY MASS INDEX: 30.25 KG/M2 | OXYGEN SATURATION: 99 % | TEMPERATURE: 98.3 F | HEART RATE: 81 BPM | RESPIRATION RATE: 18 BRPM | DIASTOLIC BLOOD PRESSURE: 87 MMHG

## 2023-11-07 DIAGNOSIS — C15.5 MALIGNANT NEOPLASM OF LOWER THIRD OF ESOPHAGUS: Primary | ICD-10-CM

## 2023-11-07 LAB
RAD ONC ARIA COURSE ID: NORMAL
RAD ONC ARIA COURSE INTENT: NORMAL
RAD ONC ARIA COURSE LAST TREATMENT DATE: NORMAL
RAD ONC ARIA COURSE START DATE: NORMAL
RAD ONC ARIA COURSE TREATMENT ELAPSED DAYS: 22
RAD ONC ARIA FIRST TREATMENT DATE: NORMAL
RAD ONC ARIA PLAN FRACTIONS TREATED TO DATE: 13
RAD ONC ARIA PLAN ID: NORMAL
RAD ONC ARIA PLAN PRESCRIBED DOSE PER FRACTION: 1.8 GY
RAD ONC ARIA PLAN PRIMARY REFERENCE POINT: NORMAL
RAD ONC ARIA PLAN TOTAL FRACTIONS PRESCRIBED: 25
RAD ONC ARIA PLAN TOTAL PRESCRIBED DOSE: 4500 CGY
RAD ONC ARIA REFERENCE POINT DOSAGE GIVEN TO DATE: 23.4 GY
RAD ONC ARIA REFERENCE POINT ID: NORMAL
RAD ONC ARIA REFERENCE POINT SESSION DOSAGE GIVEN: 1.8 GY

## 2023-11-07 PROCEDURE — 77014 CHG CT GUIDANCE RADIATION THERAPY FLDS PLACEMENT: CPT | Performed by: RADIOLOGY

## 2023-11-07 PROCEDURE — 77386: CPT | Performed by: RADIOLOGY

## 2023-11-07 NOTE — PROGRESS NOTES
On Treatment Visit       Patient: Shashank Saunders   YOB: 1965   Medical Record Number: [id number]     Date of Visit  10/17/2023  Primary Diagnosis:Malignant neoplasm of lower third of esophagus [C15.5]  Cancer Staging: Cancer Staging   Malignant neoplasm of lower third of esophagus  Staging form: Esophagus - Squamous Cell Carcinoma, AJCC 8th Edition  - Clinical: Stage II (cT2, cN0, cM0) - Signed by Winston Johnson MD on 10/5/2023         was seen today for an on treatment visit.  He is receiving radiation therapy to the esophagus. He  has received 2340 cGy in 13 fractions out of a planned dose of 5040 cGy in 28 fractions. He is currently receiving concurrent carboplatin/paclitaxel chemotherapy per Dr. Johnson.     No changes since last evaluated.  Still having some trouble swallowing but maintaining oral intake                                            Review of Systems:   Review of Systems   Constitutional:  Positive for appetite change (Unable to eat due to difficulty swallowing/food comes back up.  Able to eat soft foods.) and fatigue (8/10). Negative for unexpected weight change.   HENT:  Positive for trouble swallowing (States that certain foods are difficult to go down, comes back up.). Negative for hearing loss, sore throat, tinnitus and voice change.    Eyes:  Negative for discharge and visual disturbance.   Respiratory:  Positive for cough (Occasionally productive). Negative for chest tightness, shortness of breath and wheezing.    Cardiovascular:  Negative for chest pain, palpitations and leg swelling.   Gastrointestinal:  Positive for vomiting (When food gets stuck and comes back up.). Negative for anal bleeding, blood in stool, constipation, diarrhea and nausea.   Genitourinary:  Negative for difficulty urinating, dysuria, frequency, hematuria and urgency.   Musculoskeletal:  Positive for arthralgias (Generalized, recently dx with flu.  Recent pain in left shoulder, burning  sensation, pain comes and goes, newer) and back pain (New). Negative for joint swelling.   Skin:  Negative for color change and rash.   Neurological:  Positive for weakness (Generalized) and headaches (Occasional, different times of the day.). Negative for dizziness and numbness.   Psychiatric/Behavioral:  Positive for sleep disturbance (Due to pain, takes meds with some relief.).        Vitals:     Vitals:    11/07/23 1533   BP: 139/87   Pulse: 81   Resp: 18   Temp: 98.3 °F (36.8 °C)   SpO2: 99%       Weight:   Wt Readings from Last 3 Encounters:   11/07/23 86.4 kg (190 lb 7.6 oz)   11/06/23 85 kg (187 lb 6.3 oz)   11/06/23 85 kg (187 lb 6.3 oz)      Pain:    Pain Score    11/07/23 1533   PainSc:   7   PainLoc: Throat         Physical Exam:  Gen: WD/WN; NAD  HEENT: MMM  Trachea: midline  Chest: symmetric  Resp: normal respiratory effort  Extr: warm, well-perfused  Neuro: awake and alert; no aphasia or neglect    Plan: I have reviewed treatment setup notes, checked and approved the daily guidance images.  I reviewed dose delivery, treatment parameters and deemed them appropriate. We plan to continue radiation therapy as prescribed.        Radiation Oncology    Electronically signed by Javy George MD 11/7/2023  16:05 EST

## 2023-11-08 ENCOUNTER — HOSPITAL ENCOUNTER (OUTPATIENT)
Dept: RADIATION ONCOLOGY | Facility: HOSPITAL | Age: 58
Discharge: HOME | End: 2023-11-08

## 2023-11-08 ENCOUNTER — DOCUMENTATION (OUTPATIENT)
Dept: RADIATION ONCOLOGY | Facility: HOSPITAL | Age: 58
End: 2023-11-08
Payer: MEDICARE

## 2023-11-08 LAB
RAD ONC ARIA COURSE ID: NORMAL
RAD ONC ARIA COURSE INTENT: NORMAL
RAD ONC ARIA COURSE LAST TREATMENT DATE: NORMAL
RAD ONC ARIA COURSE START DATE: NORMAL
RAD ONC ARIA COURSE TREATMENT ELAPSED DAYS: 23
RAD ONC ARIA FIRST TREATMENT DATE: NORMAL
RAD ONC ARIA PLAN FRACTIONS TREATED TO DATE: 14
RAD ONC ARIA PLAN ID: NORMAL
RAD ONC ARIA PLAN PRESCRIBED DOSE PER FRACTION: 1.8 GY
RAD ONC ARIA PLAN PRIMARY REFERENCE POINT: NORMAL
RAD ONC ARIA PLAN TOTAL FRACTIONS PRESCRIBED: 25
RAD ONC ARIA PLAN TOTAL PRESCRIBED DOSE: 4500 CGY
RAD ONC ARIA REFERENCE POINT DOSAGE GIVEN TO DATE: 25.2 GY
RAD ONC ARIA REFERENCE POINT ID: NORMAL
RAD ONC ARIA REFERENCE POINT SESSION DOSAGE GIVEN: 1.8 GY

## 2023-11-08 PROCEDURE — 77386: CPT | Performed by: RADIOLOGY

## 2023-11-08 PROCEDURE — 77014 CHG CT GUIDANCE RADIATION THERAPY FLDS PLACEMENT: CPT | Performed by: RADIOLOGY

## 2023-11-08 NOTE — PROGRESS NOTES
Diagnosis: Esophageal cancer    Reason for Referral: Transportation     Content of Visit: OSW received a notification from Zehra CALDERON MA in radiation oncology that Mr. Saunders and spouse were requesting additional gas assistance yesterday. OSW approved for Mr. Saunders to receive a $15 fuel voucher through [facility] to be utilized at [facility]. Leona GARCÍA, radiation oncology manager, has completed the voucher and will provide this to Mr. Saunders this afternoon during his treatment. OSW support remains available.    Resources/Referrals Provided: $15 fuel card

## 2023-11-09 ENCOUNTER — DOCUMENTATION (OUTPATIENT)
Dept: NUTRITION | Facility: HOSPITAL | Age: 58
End: 2023-11-09
Payer: MEDICARE

## 2023-11-09 ENCOUNTER — HOSPITAL ENCOUNTER (OUTPATIENT)
Dept: RADIATION ONCOLOGY | Facility: HOSPITAL | Age: 58
Discharge: HOME | End: 2023-11-09

## 2023-11-09 VITALS — WEIGHT: 192.68 LBS | BODY MASS INDEX: 30.6 KG/M2

## 2023-11-09 LAB
RAD ONC ARIA COURSE ID: NORMAL
RAD ONC ARIA COURSE INTENT: NORMAL
RAD ONC ARIA COURSE LAST TREATMENT DATE: NORMAL
RAD ONC ARIA COURSE START DATE: NORMAL
RAD ONC ARIA COURSE TREATMENT ELAPSED DAYS: 24
RAD ONC ARIA FIRST TREATMENT DATE: NORMAL
RAD ONC ARIA PLAN FRACTIONS TREATED TO DATE: 15
RAD ONC ARIA PLAN ID: NORMAL
RAD ONC ARIA PLAN PRESCRIBED DOSE PER FRACTION: 1.8 GY
RAD ONC ARIA PLAN PRIMARY REFERENCE POINT: NORMAL
RAD ONC ARIA PLAN TOTAL FRACTIONS PRESCRIBED: 25
RAD ONC ARIA PLAN TOTAL PRESCRIBED DOSE: 4500 CGY
RAD ONC ARIA REFERENCE POINT DOSAGE GIVEN TO DATE: 27 GY
RAD ONC ARIA REFERENCE POINT ID: NORMAL
RAD ONC ARIA REFERENCE POINT SESSION DOSAGE GIVEN: 1.8 GY

## 2023-11-09 PROCEDURE — 77336 RADIATION PHYSICS CONSULT: CPT | Performed by: RADIOLOGY

## 2023-11-09 PROCEDURE — 77014 CHG CT GUIDANCE RADIATION THERAPY FLDS PLACEMENT: CPT | Performed by: RADIOLOGY

## 2023-11-09 PROCEDURE — 77386: CPT | Performed by: RADIOLOGY

## 2023-11-09 NOTE — PROGRESS NOTES
Outpatient Nutrition Oncology Follow Up    Patient Name: Shashank Saunders  YOB: 1965  MRN: [id number]    Recommendation(s):   High kcal, high protein small / frequent meals of only soft, moist foods.  Boost VHC TID or Boost VHC made as a milkshake TID.  Adequate fluids.    Reason for Consult:  Radiation Tx F/U       Today's Weight:  87.4 kg    Weight Change:  ~6# gain in the past week    Nutrition-related concerns:  esophageal dysphagia (induces emesis occasionally)    Current PO intake:  Soft Foods     Current Nutrition Supplement intake:    Drinks a variety 2-3 X day:  Homemade high kcal, high protein homemade milkshakes   Boost Plus / Boost VHC/ Ensure Plus / Ensure Complete 2-3 X day    Consult or Intervention:  No additional nutrition-related concerns reported today.  Pt is doing well with calorie intake from foods & from ONS.  Provided positive reinforcement to continue.    Nutrition Diagnosis: Increased nutrient needs related to increased nutrient needs due to catabolic disease as evidenced by physiological causes increasing nutrient needs. and hypermetabolic state.    Plan: Will follow up per oncology nutrition protocol

## 2023-11-10 ENCOUNTER — HOSPITAL ENCOUNTER (OUTPATIENT)
Dept: RADIATION ONCOLOGY | Facility: HOSPITAL | Age: 58
Discharge: HOME | End: 2023-11-10

## 2023-11-10 LAB
RAD ONC ARIA COURSE ID: NORMAL
RAD ONC ARIA COURSE INTENT: NORMAL
RAD ONC ARIA COURSE LAST TREATMENT DATE: NORMAL
RAD ONC ARIA COURSE START DATE: NORMAL
RAD ONC ARIA COURSE TREATMENT ELAPSED DAYS: 25
RAD ONC ARIA FIRST TREATMENT DATE: NORMAL
RAD ONC ARIA PLAN FRACTIONS TREATED TO DATE: 16
RAD ONC ARIA PLAN ID: NORMAL
RAD ONC ARIA PLAN PRESCRIBED DOSE PER FRACTION: 1.8 GY
RAD ONC ARIA PLAN PRIMARY REFERENCE POINT: NORMAL
RAD ONC ARIA PLAN TOTAL FRACTIONS PRESCRIBED: 25
RAD ONC ARIA PLAN TOTAL PRESCRIBED DOSE: 4500 CGY
RAD ONC ARIA REFERENCE POINT DOSAGE GIVEN TO DATE: 28.8 GY
RAD ONC ARIA REFERENCE POINT ID: NORMAL
RAD ONC ARIA REFERENCE POINT SESSION DOSAGE GIVEN: 1.8 GY

## 2023-11-10 PROCEDURE — 77386: CPT | Performed by: RADIOLOGY

## 2023-11-10 PROCEDURE — 77014 CHG CT GUIDANCE RADIATION THERAPY FLDS PLACEMENT: CPT | Performed by: RADIOLOGY

## 2023-11-13 ENCOUNTER — HOSPITAL ENCOUNTER (OUTPATIENT)
Dept: ONCOLOGY | Facility: HOSPITAL | Age: 58
Discharge: HOME | End: 2023-11-13
Admitting: INTERNAL MEDICINE
Payer: MEDICARE

## 2023-11-13 ENCOUNTER — HOSPITAL ENCOUNTER (OUTPATIENT)
Dept: RADIATION ONCOLOGY | Facility: HOSPITAL | Age: 58
Discharge: HOME | End: 2023-11-13

## 2023-11-13 ENCOUNTER — OFFICE VISIT (OUTPATIENT)
Dept: ONCOLOGY | Facility: HOSPITAL | Age: 58
End: 2023-11-13
Payer: MEDICARE

## 2023-11-13 VITALS
TEMPERATURE: 98.2 F | HEART RATE: 61 BPM | DIASTOLIC BLOOD PRESSURE: 74 MMHG | SYSTOLIC BLOOD PRESSURE: 154 MMHG | WEIGHT: 184.97 LBS | RESPIRATION RATE: 18 BRPM | OXYGEN SATURATION: 98 % | BODY MASS INDEX: 29.38 KG/M2

## 2023-11-13 VITALS
OXYGEN SATURATION: 98 % | SYSTOLIC BLOOD PRESSURE: 145 MMHG | HEART RATE: 71 BPM | WEIGHT: 184.97 LBS | DIASTOLIC BLOOD PRESSURE: 84 MMHG | TEMPERATURE: 98.2 F | RESPIRATION RATE: 18 BRPM | BODY MASS INDEX: 29.38 KG/M2

## 2023-11-13 DIAGNOSIS — C15.5 MALIGNANT NEOPLASM OF LOWER THIRD OF ESOPHAGUS: Primary | ICD-10-CM

## 2023-11-13 DIAGNOSIS — K21.00 GASTROESOPHAGEAL REFLUX DISEASE WITH ESOPHAGITIS, UNSPECIFIED WHETHER HEMORRHAGE: ICD-10-CM

## 2023-11-13 DIAGNOSIS — G89.3 NEOPLASM RELATED PAIN: ICD-10-CM

## 2023-11-13 DIAGNOSIS — Z45.2 ENCOUNTER FOR ADJUSTMENT OR MANAGEMENT OF VASCULAR ACCESS DEVICE: ICD-10-CM

## 2023-11-13 LAB
ALBUMIN SERPL-MCNC: 3.5 G/DL (ref 3.5–5.2)
ALBUMIN/GLOB SERPL: 1.7 G/DL
ALP SERPL-CCNC: 57 U/L (ref 39–117)
ALT SERPL W P-5'-P-CCNC: 11 U/L (ref 1–41)
ANION GAP SERPL CALCULATED.3IONS-SCNC: 6.2 MMOL/L (ref 5–15)
AST SERPL-CCNC: 17 U/L (ref 1–40)
BASOPHILS # BLD AUTO: 0.04 10*3/MM3 (ref 0–0.2)
BASOPHILS NFR BLD AUTO: 1.1 % (ref 0–1.5)
BILIRUB SERPL-MCNC: 0.4 MG/DL (ref 0–1.2)
BUN SERPL-MCNC: 15 MG/DL (ref 6–20)
BUN/CREAT SERPL: 22.7 (ref 7–25)
CALCIUM SPEC-SCNC: 8.4 MG/DL (ref 8.6–10.5)
CHLORIDE SERPL-SCNC: 104 MMOL/L (ref 98–107)
CO2 SERPL-SCNC: 25.8 MMOL/L (ref 22–29)
CREAT SERPL-MCNC: 0.66 MG/DL (ref 0.76–1.27)
DEPRECATED RDW RBC AUTO: 45.8 FL (ref 37–54)
EGFRCR SERPLBLD CKD-EPI 2021: 108 ML/MIN/1.73
EOSINOPHIL # BLD AUTO: 0.29 10*3/MM3 (ref 0–0.4)
EOSINOPHIL NFR BLD AUTO: 7.6 % (ref 0.3–6.2)
ERYTHROCYTE [DISTWIDTH] IN BLOOD BY AUTOMATED COUNT: 13.6 % (ref 12.3–15.4)
GLOBULIN UR ELPH-MCNC: 2.1 GM/DL
GLUCOSE SERPL-MCNC: 99 MG/DL (ref 65–99)
HCT VFR BLD AUTO: 38.1 % (ref 37.5–51)
HGB BLD-MCNC: 12.8 G/DL (ref 13–17.7)
IMM GRANULOCYTES # BLD AUTO: 0.01 10*3/MM3 (ref 0–0.05)
IMM GRANULOCYTES NFR BLD AUTO: 0.3 % (ref 0–0.5)
LYMPHOCYTES # BLD AUTO: 0.5 10*3/MM3 (ref 0.7–3.1)
LYMPHOCYTES NFR BLD AUTO: 13.2 % (ref 19.6–45.3)
MCH RBC QN AUTO: 31 PG (ref 26.6–33)
MCHC RBC AUTO-ENTMCNC: 33.6 G/DL (ref 31.5–35.7)
MCV RBC AUTO: 92.3 FL (ref 79–97)
MONOCYTES # BLD AUTO: 0.44 10*3/MM3 (ref 0.1–0.9)
MONOCYTES NFR BLD AUTO: 11.6 % (ref 5–12)
NEUTROPHILS NFR BLD AUTO: 2.52 10*3/MM3 (ref 1.7–7)
NEUTROPHILS NFR BLD AUTO: 66.2 % (ref 42.7–76)
PLATELET # BLD AUTO: 135 10*3/MM3 (ref 140–450)
PMV BLD AUTO: 10.6 FL (ref 6–12)
POTASSIUM SERPL-SCNC: 4.1 MMOL/L (ref 3.5–5.2)
PROT SERPL-MCNC: 5.6 G/DL (ref 6–8.5)
RAD ONC ARIA COURSE ID: NORMAL
RAD ONC ARIA COURSE INTENT: NORMAL
RAD ONC ARIA COURSE LAST TREATMENT DATE: NORMAL
RAD ONC ARIA COURSE START DATE: NORMAL
RAD ONC ARIA COURSE TREATMENT ELAPSED DAYS: 28
RAD ONC ARIA FIRST TREATMENT DATE: NORMAL
RAD ONC ARIA PLAN FRACTIONS TREATED TO DATE: 17
RAD ONC ARIA PLAN ID: NORMAL
RAD ONC ARIA PLAN PRESCRIBED DOSE PER FRACTION: 1.8 GY
RAD ONC ARIA PLAN PRIMARY REFERENCE POINT: NORMAL
RAD ONC ARIA PLAN TOTAL FRACTIONS PRESCRIBED: 25
RAD ONC ARIA PLAN TOTAL PRESCRIBED DOSE: 4500 CGY
RAD ONC ARIA REFERENCE POINT DOSAGE GIVEN TO DATE: 30.6 GY
RAD ONC ARIA REFERENCE POINT ID: NORMAL
RAD ONC ARIA REFERENCE POINT SESSION DOSAGE GIVEN: 1.8 GY
RBC # BLD AUTO: 4.13 10*6/MM3 (ref 4.14–5.8)
SODIUM SERPL-SCNC: 136 MMOL/L (ref 136–145)
WBC NRBC COR # BLD: 3.8 10*3/MM3 (ref 3.4–10.8)

## 2023-11-13 PROCEDURE — 25010000002 CARBOPLATIN PER 50 MG: Performed by: INTERNAL MEDICINE

## 2023-11-13 PROCEDURE — 85025 COMPLETE CBC W/AUTO DIFF WBC: CPT | Performed by: INTERNAL MEDICINE

## 2023-11-13 PROCEDURE — 96375 TX/PRO/DX INJ NEW DRUG ADDON: CPT

## 2023-11-13 PROCEDURE — 25010000002 DEXAMETHASONE SODIUM PHOSPHATE 120 MG/30ML SOLUTION: Performed by: INTERNAL MEDICINE

## 2023-11-13 PROCEDURE — 25810000003 SODIUM CHLORIDE 0.9 % SOLUTION 250 ML FLEX CONT: Performed by: INTERNAL MEDICINE

## 2023-11-13 PROCEDURE — 25010000002 HEPARIN LOCK FLUSH PER 10 UNITS: Performed by: INTERNAL MEDICINE

## 2023-11-13 PROCEDURE — 25810000003 SODIUM CHLORIDE 0.9 % SOLUTION: Performed by: INTERNAL MEDICINE

## 2023-11-13 PROCEDURE — 25010000002 PALONOSETRON PER 25 MCG: Performed by: INTERNAL MEDICINE

## 2023-11-13 PROCEDURE — 96417 CHEMO IV INFUS EACH ADDL SEQ: CPT

## 2023-11-13 PROCEDURE — 77386: CPT | Performed by: RADIOLOGY

## 2023-11-13 PROCEDURE — 25010000002 PACLITAXEL PER 1 MG: Performed by: INTERNAL MEDICINE

## 2023-11-13 PROCEDURE — 25010000002 DIPHENHYDRAMINE PER 50 MG: Performed by: INTERNAL MEDICINE

## 2023-11-13 PROCEDURE — 80053 COMPREHEN METABOLIC PANEL: CPT | Performed by: INTERNAL MEDICINE

## 2023-11-13 PROCEDURE — 77014 CHG CT GUIDANCE RADIATION THERAPY FLDS PLACEMENT: CPT | Performed by: RADIOLOGY

## 2023-11-13 PROCEDURE — 96413 CHEMO IV INFUSION 1 HR: CPT

## 2023-11-13 RX ORDER — DIPHENHYDRAMINE HYDROCHLORIDE 50 MG/ML
50 INJECTION INTRAMUSCULAR; INTRAVENOUS AS NEEDED
Status: CANCELLED | OUTPATIENT
Start: 2023-11-13

## 2023-11-13 RX ORDER — SODIUM CHLORIDE 0.9 % (FLUSH) 0.9 %
20 SYRINGE (ML) INJECTION AS NEEDED
Status: CANCELLED | OUTPATIENT
Start: 2023-11-13

## 2023-11-13 RX ORDER — SODIUM CHLORIDE 9 MG/ML
250 INJECTION, SOLUTION INTRAVENOUS ONCE
Status: CANCELLED | OUTPATIENT
Start: 2023-11-13

## 2023-11-13 RX ORDER — HEPARIN SODIUM (PORCINE) LOCK FLUSH IV SOLN 100 UNIT/ML 100 UNIT/ML
500 SOLUTION INTRAVENOUS AS NEEDED
Status: DISCONTINUED | OUTPATIENT
Start: 2023-11-13 | End: 2023-11-14 | Stop reason: HOSPADM

## 2023-11-13 RX ORDER — FAMOTIDINE 10 MG/ML
20 INJECTION, SOLUTION INTRAVENOUS AS NEEDED
Status: CANCELLED | OUTPATIENT
Start: 2023-11-13

## 2023-11-13 RX ORDER — FAMOTIDINE 10 MG/ML
20 INJECTION, SOLUTION INTRAVENOUS ONCE
Status: CANCELLED | OUTPATIENT
Start: 2023-11-13

## 2023-11-13 RX ORDER — HEPARIN SODIUM (PORCINE) LOCK FLUSH IV SOLN 100 UNIT/ML 100 UNIT/ML
500 SOLUTION INTRAVENOUS AS NEEDED
Status: CANCELLED | OUTPATIENT
Start: 2023-11-20

## 2023-11-13 RX ORDER — SODIUM CHLORIDE 0.9 % (FLUSH) 0.9 %
20 SYRINGE (ML) INJECTION AS NEEDED
Status: DISCONTINUED | OUTPATIENT
Start: 2023-11-13 | End: 2023-11-14 | Stop reason: HOSPADM

## 2023-11-13 RX ORDER — SODIUM CHLORIDE 9 MG/ML
250 INJECTION, SOLUTION INTRAVENOUS ONCE
Status: COMPLETED | OUTPATIENT
Start: 2023-11-13 | End: 2023-11-13

## 2023-11-13 RX ORDER — FAMOTIDINE 10 MG/ML
20 INJECTION, SOLUTION INTRAVENOUS ONCE
Status: COMPLETED | OUTPATIENT
Start: 2023-11-13 | End: 2023-11-13

## 2023-11-13 RX ORDER — SUCRALFATE ORAL 1 G/10ML
1 SUSPENSION ORAL 4 TIMES DAILY
Qty: 414 ML | Refills: 5 | Status: SHIPPED | OUTPATIENT
Start: 2023-11-13

## 2023-11-13 RX ORDER — PALONOSETRON 0.05 MG/ML
0.25 INJECTION, SOLUTION INTRAVENOUS ONCE
Status: CANCELLED | OUTPATIENT
Start: 2023-11-13

## 2023-11-13 RX ORDER — PALONOSETRON 0.05 MG/ML
0.25 INJECTION, SOLUTION INTRAVENOUS ONCE
Status: COMPLETED | OUTPATIENT
Start: 2023-11-13 | End: 2023-11-13

## 2023-11-13 RX ADMIN — PACLITAXEL 105 MG: 6 INJECTION, SOLUTION, CONCENTRATE INTRAVENOUS at 11:50

## 2023-11-13 RX ADMIN — PALONOSETRON 0.25 MG: 0.05 INJECTION, SOLUTION INTRAVENOUS at 11:00

## 2023-11-13 RX ADMIN — FAMOTIDINE 20 MG: 10 INJECTION INTRAVENOUS at 10:58

## 2023-11-13 RX ADMIN — Medication 20 ML: at 13:35

## 2023-11-13 RX ADMIN — DIPHENHYDRAMINE HYDROCHLORIDE 25 MG: 50 INJECTION, SOLUTION INTRAMUSCULAR; INTRAVENOUS at 11:03

## 2023-11-13 RX ADMIN — DEXAMETHASONE SODIUM PHOSPHATE 12 MG: 4 INJECTION, SOLUTION INTRA-ARTICULAR; INTRALESIONAL; INTRAMUSCULAR; INTRAVENOUS; SOFT TISSUE at 11:21

## 2023-11-13 RX ADMIN — SODIUM CHLORIDE 250 ML: 9 INJECTION, SOLUTION INTRAVENOUS at 10:55

## 2023-11-13 RX ADMIN — CARBOPLATIN 300 MG: 600 INJECTION, SOLUTION INTRAVENOUS at 13:02

## 2023-11-13 RX ADMIN — HEPARIN SODIUM (PORCINE) LOCK FLUSH IV SOLN 100 UNIT/ML 500 UNITS: 100 SOLUTION at 13:35

## 2023-11-13 NOTE — PROGRESS NOTES
Chief Complaint  Malignant neoplasm of lower third of esophagus    Shyanne Grijalva, Shyanne Woods, LAUREN    Subjective          Shashank Saunders presents to [facility] for GEJ squamous cell carcinoma    Mr. 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  Patient has been started on chemoradiation. He is due for Cycle 4 of chemotherapy with weekly Carboplatin and Paclitaxel. Regurgitation has improved. He does have burning in his esophagus and stomach region with eating. Already on protonix twice daily. No mouth sores or mouth pain. No fevers, chills, diarrhea, constipation, nausea, or neuropathy. Tolerating chemo well overall.       Oncology/Hematology History Overview Note   9/8/23: Presented to [facility] ED with inability to tolerate solids and 40 lb weight loss. CT Chest showed CT scan of the chest with IV contrast demonstrating moderately dilated fluid-filled esophagus. Emphysema. Part solid opacity 6 mm in the right upper lobe.     9/9/23: EGD performed showing an ulcerated and irregular severe stenosis in the lower third of the esophagus, that was dilated and biopsied. GEJ biopsy showing poorly differentiated squamous cell carcinoma.     10/3/23: NM PET: 1.8 cm hypermetabolic mass in the distal esophagus near the GE junction consistent with the patient's history of esophageal carcinoma.  No evidence of metastatic disease.    10/16/23: C1D1 Carboplatin and Paclitaxel. D1 of XRT.      Cycle 2 delayed due to flu. 10/30/23: C2D1 Carbo/Paclitaxel     Malignant neoplasm of lower third of esophagus   9/14/2023 Initial Diagnosis    Malignant neoplasm of lower third of esophagus     10/5/2023 Cancer Staged     Staging form: Esophagus - Squamous Cell Carcinoma, AJCC 8th Edition  - Clinical: Stage II (cT2, cN0, cM0) - Signed by Winston Johnson MD on 10/5/2023     10/5/2023 -  Radiation    RADIATION THERAPY Treatment Details (Noted on 10/5/2023)  Site: Esophagus - Lower  Technique: IMRT  Goal: No goal specified  Planned Treatment Start Date: No planned start date specified     10/16/2023 -  Chemotherapy    OP GE PACLitaxel / CARBOplatin (weekly) + XRT           Review of Systems   Constitutional:  Positive for appetite change (Decreased) and fatigue. Negative for diaphoresis, fever, unexpected weight gain and unexpected weight loss.   HENT:  Positive for sore throat and trouble swallowing. Negative for hearing loss, mouth sores, swollen glands and voice change.    Eyes:  Negative for blurred vision.   Respiratory:  Negative for cough, shortness of breath and wheezing.    Cardiovascular:  Positive for chest pain. Negative for palpitations.   Gastrointestinal:  Negative for abdominal pain, blood in stool, constipation, diarrhea, nausea and vomiting.   Endocrine: Negative for cold intolerance and heat intolerance.   Genitourinary:  Positive for flank pain (RT side). Negative for difficulty urinating, dysuria, frequency, hematuria and urinary incontinence.   Musculoskeletal:  Positive for arthralgias and back pain. Negative for myalgias.   Skin:  Negative for rash, skin lesions and wound.   Neurological:  Positive for weakness. Negative for dizziness, seizures, numbness and headache.   Hematological:  Does not bruise/bleed easily.   Psychiatric/Behavioral:  Negative for depressed mood. The patient is nervous/anxious.    All other systems reviewed and are negative.    Current Outpatient Medications on File Prior to Visit   Medication Sig Dispense Refill    albuterol (PROVENTIL) (2.5 MG/3ML) 0.083% nebulizer solution Inhale 2.5 mg.      aspirin 81 MG EC tablet Take 1 tablet by mouth Daily. 90 tablet 3    atorvastatin  (LIPITOR) 40 MG tablet Take 1 tablet by mouth Every Night. 30 tablet 5    benzonatate (Tessalon Perles) 100 MG capsule Take 1 capsule by mouth 3 (Three) Times a Day As Needed for Cough. 90 capsule 2    carvedilol (COREG) 12.5 MG tablet Take 1 tablet by mouth Every 12 (Twelve) Hours.      Eliquis 5 MG tablet tablet Take 1 tablet by mouth Every 12 (Twelve) Hours.      lisinopril (PRINIVIL,ZESTRIL) 10 MG tablet Take 1 tablet by mouth Daily.      metFORMIN (GLUCOPHAGE) 500 MG tablet Take 1 tablet by mouth 2 (Two) Times a Day With Meals.      ondansetron (ZOFRAN) 8 MG tablet Take 1 tablet by mouth 3 (Three) Times a Day As Needed for Nausea or Vomiting. 30 tablet 5    ondansetron ODT (ZOFRAN-ODT) 4 MG disintegrating tablet Place 1 tablet on the tongue Every 8 (Eight) Hours As Needed.      oxyCODONE-acetaminophen (PERCOCET) 5-325 MG per tablet Take 2 tablets by mouth Every 6 (Six) Hours As Needed for Moderate Pain. 120 tablet 0    pantoprazole (PROTONIX) 40 MG EC tablet Take 1 tablet by mouth 2 (Two) Times a Day Before Meals. 60 tablet 2    QUEtiapine (SEROquel) 200 MG tablet Take 1 tablet by mouth Every Night.      sucralfate (CARAFATE) 1 g tablet Take 1 tablet by mouth 3 (Three) Times a Day Before Meals. 90 tablet 1     No current facility-administered medications on file prior to visit.       No Known Allergies  Past Medical History:   Diagnosis Date    Asthma     Congestive heart failure (CHF)     Coronary artery disease     Diabetes mellitus     Esophageal cancer     Heart attack     Hypertension      Past Surgical History:   Procedure Laterality Date    CARDIAC CATHETERIZATION      CARDIAC SURGERY      Stent x1    ENDOSCOPY N/A 9/9/2023    Procedure: ESOPHAGOGASTRODUODENOSCOPY WITH ESOPHAGEAL BALLOON DILATATION/BIOPSIES;  Surgeon: Gary Stein MD;  Location: [facility] ENDOSCOPY;  Service: Gastroenterology;  Laterality: N/A;  ULCERATED STRICTURE OF GE JUNCTION    VENOUS ACCESS DEVICE (PORT) INSERTION Left  10/12/2023    Procedure: INSERTION VENOUS ACCESS DEVICE;  Surgeon: Yung Montejo MD;  Location: [facility] MAIN OR;  Service: General;  Laterality: Left;     Social History     Socioeconomic History    Marital status:    Tobacco Use    Smoking status: Every Day     Packs/day: .5     Types: Cigarettes     Start date:     Smokeless tobacco: Never   Vaping Use    Vaping Use: Never used   Substance and Sexual Activity    Alcohol use: Not Currently    Drug use: Not Currently     Types: Cocaine(coke), Marijuana    Sexual activity: Defer     History reviewed. No pertinent family history.    Objective   Physical Exam  Well appearing patient in no acute distress on RA  Anicteric sclerae, no rash on exposed skin  Respirations non-labored  + dupuytren's contracture left hand  Awake, alert, and oriented x 4. No gross neurologic deficit  Appropriate mood and affect    ECO    Vitals:    23 1016   BP: 145/84   Pulse: 71   Resp: 18   Temp: 98.2 °F (36.8 °C)   TempSrc: Temporal   SpO2: 98%   Weight: 83.9 kg (184 lb 15.5 oz)   PainSc:   8   PainLoc: Chest                     PHQ-9 Total Score:           Result Review :   The following data was reviewed by: Winston Johnson MD on 23:  Lab Results   Component Value Date    HGB 12.8 (L) 2023    HCT 38.1 2023    MCV 92.3 2023     (L) 2023    WBC 3.80 2023    NEUTROABS 2.52 2023    LYMPHSABS 0.50 (L) 2023    MONOSABS 0.44 2023    EOSABS 0.29 2023    BASOSABS 0.04 2023     Lab Results   Component Value Date    GLUCOSE 99 2023    BUN 15 2023    CREATININE 0.66 (L) 2023     2023    K 4.1 2023     2023    CO2 25.8 2023    CALCIUM 8.4 (L) 2023    PROTEINTOT 5.6 (L) 2023    ALBUMIN 3.5 2023    BILITOT 0.4 2023    ALKPHOS 57 2023    AST 17 2023    ALT 11 2023     Lab Results   Component Value Date    MG  1.8 09/09/2023     Labs personally reviewed and essentially normal. Low calcium    10/17/23 Rad onc note personally reviewed    No radiology results for the last 30 days.        Assessment and Plan    Diagnoses and all orders for this visit:    1. Malignant neoplasm of lower third of esophagus (Primary)    2. Neoplasm related pain    3. Gastroesophageal reflux disease with esophagitis, unspecified whether hemorrhage    Other orders  -     sodium chloride 0.9 % infusion 250 mL  -     famotidine (PEPCID) injection 20 mg  -     diphenhydrAMINE (BENADRYL) IVPB 25 mg  -     palonosetron (ALOXI) injection 0.25 mg  -     dexAMETHasone (DECADRON) 12 mg in sodium chloride 0.9 % IVPB  -     PACLitaxel (TAXOL) 105 mg in sodium chloride 0.9 % 267.5 mL chemo IVPB  -     CARBOplatin (PARAPLATIN) 300 mg in sodium chloride 0.9 % 130 mL chemo IVPB  -     Hydrocortisone Sod Suc (PF) (Solu-CORTEF) injection 100 mg  -     diphenhydrAMINE (BENADRYL) injection 50 mg  -     famotidine (PEPCID) injection 20 mg        GEJ squamous cell carcinoma  CT Chest without any metastatic disease, PET scan 10/3/23 for complete staging confirmed localized disease.  He has been seen by Dr. Chau with thoracic surgery and Dr. George with radiation oncology. Local disease treated with neoadjuvant chemoradiation followed by consideration for surgery. Chemotherapy with Carboplatin and Paclitaxel given weekly. First dose 10/16/23. PRN anti-emetics provided.     11/13/23: C4D1 Carboplatin and Paclitaxel. Labs appropriate to proceed      Neoplasm related pain  Continue Percocet 5-325 mg, two pills q6h PRN. Continue PRN NSAID.      Subacute CVA  10/5/23 MRI brain performed due to concern for seizure and showed likely bilateral subacute infarcts. Atorvastatin 40 mg daily. Continue ASA 81 mg daily. Patient already on eliquis.     Esophagitis  Protonix BID already ordered. Worse 2/2 radiation. Add liquid carafate as needed.     This is an acute or chronic illness  that poses a threat to life or bodily function. The above treatment plan involves a high risk of complications and/or mortality of patient management. Continue to monitor for severe toxicities with frequent labs and office visits.      I spent 25 minutes caring for Shashank on this date of service. This time includes time spent by me in the following activities:preparing for the visit, reviewing tests, obtaining and/or reviewing a separately obtained history, performing a medically appropriate examination and/or evaluation , counseling and educating the patient/family/caregiver, ordering medications, tests, or procedures, referring and communicating with other health care professionals , documenting information in the medical record, independently interpreting results and communicating that information with the patient/family/caregiver, and care coordination    Patient Follow Up: Cycle 5 chemo  Patient was given instructions and counseling regarding his condition or for health maintenance advice. Please see specific information pulled into the AVS if appropriate.

## 2023-11-15 ENCOUNTER — DOCUMENTATION (OUTPATIENT)
Dept: NUTRITION | Facility: HOSPITAL | Age: 58
End: 2023-11-15
Payer: MEDICARE

## 2023-11-15 ENCOUNTER — HOSPITAL ENCOUNTER (OUTPATIENT)
Dept: RADIATION ONCOLOGY | Facility: HOSPITAL | Age: 58
Discharge: HOME | End: 2023-11-15

## 2023-11-15 VITALS — BODY MASS INDEX: 28.85 KG/M2 | WEIGHT: 181.66 LBS

## 2023-11-15 LAB
RAD ONC ARIA COURSE ID: NORMAL
RAD ONC ARIA COURSE INTENT: NORMAL
RAD ONC ARIA COURSE LAST TREATMENT DATE: NORMAL
RAD ONC ARIA COURSE START DATE: NORMAL
RAD ONC ARIA COURSE TREATMENT ELAPSED DAYS: 30
RAD ONC ARIA FIRST TREATMENT DATE: NORMAL
RAD ONC ARIA PLAN FRACTIONS TREATED TO DATE: 18
RAD ONC ARIA PLAN ID: NORMAL
RAD ONC ARIA PLAN PRESCRIBED DOSE PER FRACTION: 1.8 GY
RAD ONC ARIA PLAN PRIMARY REFERENCE POINT: NORMAL
RAD ONC ARIA PLAN TOTAL FRACTIONS PRESCRIBED: 25
RAD ONC ARIA PLAN TOTAL PRESCRIBED DOSE: 4500 CGY
RAD ONC ARIA REFERENCE POINT DOSAGE GIVEN TO DATE: 32.4 GY
RAD ONC ARIA REFERENCE POINT ID: NORMAL
RAD ONC ARIA REFERENCE POINT SESSION DOSAGE GIVEN: 1.8 GY

## 2023-11-15 PROCEDURE — 77014 CHG CT GUIDANCE RADIATION THERAPY FLDS PLACEMENT: CPT | Performed by: RADIOLOGY

## 2023-11-15 PROCEDURE — 77386: CPT | Performed by: RADIOLOGY

## 2023-11-15 NOTE — PROGRESS NOTES
Outpatient Nutrition Oncology Follow Up    Patient Name: Shashank Saunders  YOB: 1965  MRN: [id number]    Recommendation(s):   High kcal, high protein small / frequent meals of only soft, moist foods.  Boost VHC or Ensure Complete TID (or either ONS made as a milkshake TID).  Adequate fluids.      Reason for Consult:  Radiation Tx F/U       Today's Weight:  82.4 kg    Weight Change: 4.7% wt decline in the past week (wt fluctuates:  ~4.7% wt decline / difference over the past month as well)    Nutrition-related concerns: Nausea, Early Satiety, Dysphagia/odynophagia, and Esophagitis    Current PO intake:  Soft foods (encouraged no acidic, spicy, excessively greasy- no gastric irritants)     Current Nutrition Supplement intake:   Drinks a variety 2-3 X day:  Homemade high kcal, high protein homemade milkshakes   Boost Plus / Boost VHC/ Ensure Plus / Ensure Complete 2-3 X day    Consult or Intervention:  Pt reports worsening esophageal discomfort, although the emesis has improved with treatment.  He is picking up Carafate solution today.  Reviewed MNT for reflux-like symptoms.  Provided more samples of Ensure Complete.    Nutrition Diagnosis: Unintentional weight loss related to Inability to consume sufficient energy as evidenced by physiological causes increasing nutrient needs., hypermetabolic state., and ongoing esophageal dysphagia / esophagitis discomfort.    Plan: Will follow up per oncology nutrition protocol

## 2023-11-16 ENCOUNTER — HOSPITAL ENCOUNTER (OUTPATIENT)
Dept: RADIATION ONCOLOGY | Facility: HOSPITAL | Age: 58
Discharge: HOME | End: 2023-11-16

## 2023-11-16 VITALS
WEIGHT: 179.01 LBS | OXYGEN SATURATION: 99 % | TEMPERATURE: 98.2 F | RESPIRATION RATE: 16 BRPM | DIASTOLIC BLOOD PRESSURE: 98 MMHG | BODY MASS INDEX: 28.43 KG/M2 | SYSTOLIC BLOOD PRESSURE: 150 MMHG | HEART RATE: 113 BPM

## 2023-11-16 DIAGNOSIS — C15.5 MALIGNANT NEOPLASM OF LOWER THIRD OF ESOPHAGUS: Primary | ICD-10-CM

## 2023-11-16 LAB
RAD ONC ARIA COURSE ID: NORMAL
RAD ONC ARIA COURSE INTENT: NORMAL
RAD ONC ARIA COURSE LAST TREATMENT DATE: NORMAL
RAD ONC ARIA COURSE START DATE: NORMAL
RAD ONC ARIA COURSE TREATMENT ELAPSED DAYS: 31
RAD ONC ARIA FIRST TREATMENT DATE: NORMAL
RAD ONC ARIA PLAN FRACTIONS TREATED TO DATE: 19
RAD ONC ARIA PLAN ID: NORMAL
RAD ONC ARIA PLAN PRESCRIBED DOSE PER FRACTION: 1.8 GY
RAD ONC ARIA PLAN PRIMARY REFERENCE POINT: NORMAL
RAD ONC ARIA PLAN TOTAL FRACTIONS PRESCRIBED: 25
RAD ONC ARIA PLAN TOTAL PRESCRIBED DOSE: 4500 CGY
RAD ONC ARIA REFERENCE POINT DOSAGE GIVEN TO DATE: 34.2 GY
RAD ONC ARIA REFERENCE POINT ID: NORMAL
RAD ONC ARIA REFERENCE POINT SESSION DOSAGE GIVEN: 1.8 GY

## 2023-11-16 PROCEDURE — 77386: CPT | Performed by: RADIOLOGY

## 2023-11-16 PROCEDURE — 77014 CHG CT GUIDANCE RADIATION THERAPY FLDS PLACEMENT: CPT | Performed by: RADIOLOGY

## 2023-11-16 NOTE — PROGRESS NOTES
On Treatment Visit       Patient: Shashank Saunders   YOB: 1965   Medical Record Number: [id number]     Date of Visit  10/17/2023  Primary Diagnosis:Malignant neoplasm of lower third of esophagus [C15.5]  Cancer Staging: Cancer Staging   Malignant neoplasm of lower third of esophagus  Staging form: Esophagus - Squamous Cell Carcinoma, AJCC 8th Edition  - Clinical: Stage II (cT2, cN0, cM0) - Signed by Winston Johnson MD on 10/5/2023         was seen today for an on treatment visit.  He is receiving radiation therapy to the esophagus. He  has received 3420 cGy in 19 fractions out of a planned dose of 5040 cGy in 28 fractions. He is currently receiving concurrent carboplatin/paclitaxel chemotherapy per Dr. Johnson.     Patient is having some dysphagia and esophagitis for which she is taking Protonix, Carafate, and Percocet.  He just got switched to Carafate suspension.  He says his weight fluctuates.  He is using liquid nutrition supplements.                                            Review of Systems:   Review of Systems   Constitutional:  Positive for appetite change (Unable to eat due to difficulty swallowing/food comes back up.  Able to eat soft foods.), fatigue (8/10) and unexpected weight change (5LB WEIGHT LOSS IN 3 DAYS).   HENT:  Positive for trouble swallowing (States that certain foods are difficult to go down, comes back up. Improving since starting carafate). Negative for hearing loss, sore throat, tinnitus and voice change.    Eyes:  Negative for discharge and visual disturbance.   Respiratory:  Positive for cough (Occasionally productive). Negative for chest tightness, shortness of breath and wheezing.    Cardiovascular:  Positive for chest pain (esophageal pain). Negative for palpitations and leg swelling.   Gastrointestinal:  Positive for nausea (occasional) and vomiting (When food gets stuck and comes back up.). Negative for anal bleeding, blood in stool, constipation and  diarrhea.   Genitourinary:  Negative for difficulty urinating, dysuria, frequency, hematuria and urgency.   Musculoskeletal:  Positive for arthralgias (Generalized, recently dx with flu.  Recent pain in left shoulder, burning sensation, pain comes and goes, newer) and back pain (New). Negative for joint swelling.   Skin:  Negative for color change and rash.   Neurological:  Positive for weakness (Generalized) and headaches (Occasional, different times of the day.). Negative for dizziness and numbness.   Psychiatric/Behavioral:  Positive for sleep disturbance (Due to pain, takes meds with some relief.).        Vitals:     Vitals:    11/16/23 1555   BP: 150/98   Pulse: 113   Resp: 16   Temp: 98.2 °F (36.8 °C)   SpO2: 99%       Weight:   Wt Readings from Last 3 Encounters:   11/16/23 81.2 kg (179 lb 0.2 oz)   11/15/23 82.4 kg (181 lb 10.5 oz)   11/13/23 83.9 kg (184 lb 15.5 oz)      Pain:    Pain Score    11/16/23 1555   PainSc:   8   PainLoc: Abdomen  Comment: ABDOMEN/CHEST         Physical Exam:  Gen: WD/WN; NAD  Skin: No erythema  Resp: normal respiratory effort  Abdomen: Nontender  Neuro: awake and alert; no aphasia or neglect    Plan: I have reviewed treatment setup notes, checked and approved the daily guidance images.  I reviewed dose delivery, treatment parameters and deemed them appropriate. We plan to continue radiation therapy as prescribed.        Radiation Oncology    Electronically signed by Mayra Eduardo MD 11/16/2023  16:16 EST

## 2023-11-19 ENCOUNTER — HOSPITAL ENCOUNTER (OUTPATIENT)
Dept: RADIATION ONCOLOGY | Facility: HOSPITAL | Age: 58
Discharge: HOME | End: 2023-11-19
Payer: MEDICARE

## 2023-11-19 VITALS
DIASTOLIC BLOOD PRESSURE: 92 MMHG | OXYGEN SATURATION: 100 % | BODY MASS INDEX: 28.19 KG/M2 | SYSTOLIC BLOOD PRESSURE: 139 MMHG | TEMPERATURE: 97.8 F | HEART RATE: 88 BPM | WEIGHT: 177.47 LBS | RESPIRATION RATE: 20 BRPM

## 2023-11-19 DIAGNOSIS — C15.5 MALIGNANT NEOPLASM OF LOWER THIRD OF ESOPHAGUS: Primary | ICD-10-CM

## 2023-11-19 LAB
RAD ONC ARIA COURSE ID: NORMAL
RAD ONC ARIA COURSE INTENT: NORMAL
RAD ONC ARIA COURSE LAST TREATMENT DATE: NORMAL
RAD ONC ARIA COURSE START DATE: NORMAL
RAD ONC ARIA COURSE TREATMENT ELAPSED DAYS: 34
RAD ONC ARIA FIRST TREATMENT DATE: NORMAL
RAD ONC ARIA PLAN FRACTIONS TREATED TO DATE: 20
RAD ONC ARIA PLAN ID: NORMAL
RAD ONC ARIA PLAN PRESCRIBED DOSE PER FRACTION: 1.8 GY
RAD ONC ARIA PLAN PRIMARY REFERENCE POINT: NORMAL
RAD ONC ARIA PLAN TOTAL FRACTIONS PRESCRIBED: 25
RAD ONC ARIA PLAN TOTAL PRESCRIBED DOSE: 4500 CGY
RAD ONC ARIA REFERENCE POINT DOSAGE GIVEN TO DATE: 36 GY
RAD ONC ARIA REFERENCE POINT ID: NORMAL
RAD ONC ARIA REFERENCE POINT SESSION DOSAGE GIVEN: 1.8 GY

## 2023-11-19 PROCEDURE — 77336 RADIATION PHYSICS CONSULT: CPT | Performed by: RADIOLOGY

## 2023-11-19 PROCEDURE — 77386: CPT | Performed by: RADIOLOGY

## 2023-11-19 PROCEDURE — 77014 CHG CT GUIDANCE RADIATION THERAPY FLDS PLACEMENT: CPT | Performed by: RADIOLOGY

## 2023-11-19 NOTE — PROGRESS NOTES
On Treatment Visit       Patient: Shashank Saunders   YOB: 1965   Medical Record Number: [id number]     Date of Visit  10/17/2023  Primary Diagnosis:Malignant neoplasm of lower third of esophagus [C15.5]  Cancer Staging: Cancer Staging   Malignant neoplasm of lower third of esophagus  Staging form: Esophagus - Squamous Cell Carcinoma, AJCC 8th Edition  - Clinical: Stage II (cT2, cN0, cM0) - Signed by Winston Johnson MD on 10/5/2023         was seen today for an on treatment visit.  He is receiving radiation therapy to the esophagus. He  has received 3600 cGy in 20 fractions out of a planned dose of 5040 cGy in 28 fractions. He is currently receiving concurrent carboplatin/paclitaxel chemotherapy per Dr. Johnson.     Experiencing progressive odynophagia resulting in decreased nutritional intake.  Taking Percocet 5/325 as directed and also taking Carafate with no improvement                                            Review of Systems:   Review of Systems   Constitutional:  Positive for appetite change (Burning senstation with eating foods or drinking.  Able to eat soft foods.), fatigue (10/10) and unexpected weight change (Down 7lbs from last week.).   HENT:  Positive for sore throat (Slight) and trouble swallowing (Burning sensation that is constant, worse with trying to eat or drink.). Negative for hearing loss, tinnitus and voice change.    Eyes:  Negative for discharge and visual disturbance.   Respiratory:  Positive for cough (Occasionally productive, unsure of color.  Ongoing.). Negative for chest tightness, shortness of breath and wheezing.    Cardiovascular:  Negative for chest pain, palpitations and leg swelling.   Gastrointestinal:  Negative for abdominal distention, abdominal pain, anal bleeding, blood in stool, constipation, diarrhea, nausea and vomiting.        Esophageal burning sensation.     Genitourinary:  Positive for difficulty urinating (Stream starts and stops, ongoing).  Negative for dysuria, frequency, hematuria and urgency.   Musculoskeletal:  Positive for arthralgias (Generalized, ongoing but worse) and back pain (Ongoing but worse.). Negative for joint swelling.   Skin:  Negative for color change and rash.   Neurological:  Positive for weakness (Generalized) and headaches (Occasional, different times of the day.). Negative for dizziness and numbness.   Psychiatric/Behavioral:  Positive for sleep disturbance (Due to pain, takes meds with some relief.).        Vitals:     Vitals:    11/19/23 1257   BP: 139/92   Pulse: 88   Resp: 20   Temp: 97.8 °F (36.6 °C)   SpO2: 100%       Weight:   Wt Readings from Last 3 Encounters:   11/19/23 80.5 kg (177 lb 7.5 oz)   11/16/23 81.2 kg (179 lb 0.2 oz)   11/15/23 82.4 kg (181 lb 10.5 oz)      Pain:    Pain Score    11/19/23 1257   PainSc: 10-Worst pain ever   PainLoc: Generalized         Physical Exam:  Gen: Appears tearful and fatigued  HEENT: MMM  Trachea: midline  Chest: symmetric  Resp: normal respiratory effort  Extr: warm, well-perfused  Neuro: awake and alert; no aphasia or neglect    Plan: I have reviewed treatment setup notes, checked and approved the daily guidance images.  I reviewed dose delivery, treatment parameters and deemed them appropriate. We plan to continue radiation therapy as prescribed.      Prescribed Shana's Magic potion and advised increased fluid intake    Radiation Oncology    Electronically signed by Javy George MD 11/19/2023  13:31 EST

## 2023-11-20 ENCOUNTER — HOSPITAL ENCOUNTER (OUTPATIENT)
Dept: ONCOLOGY | Facility: HOSPITAL | Age: 58
Discharge: HOME | End: 2023-11-20
Admitting: INTERNAL MEDICINE
Payer: MEDICARE

## 2023-11-20 ENCOUNTER — OFFICE VISIT (OUTPATIENT)
Dept: ONCOLOGY | Facility: HOSPITAL | Age: 58
End: 2023-11-20
Payer: MEDICARE

## 2023-11-20 ENCOUNTER — HOSPITAL ENCOUNTER (OUTPATIENT)
Dept: RADIATION ONCOLOGY | Facility: HOSPITAL | Age: 58
Discharge: HOME | End: 2023-11-20
Payer: MEDICARE

## 2023-11-20 VITALS
DIASTOLIC BLOOD PRESSURE: 69 MMHG | TEMPERATURE: 98.2 F | WEIGHT: 177.91 LBS | BODY MASS INDEX: 28.26 KG/M2 | OXYGEN SATURATION: 98 % | RESPIRATION RATE: 17 BRPM | HEART RATE: 56 BPM | SYSTOLIC BLOOD PRESSURE: 122 MMHG

## 2023-11-20 VITALS
HEART RATE: 73 BPM | DIASTOLIC BLOOD PRESSURE: 89 MMHG | RESPIRATION RATE: 17 BRPM | OXYGEN SATURATION: 98 % | SYSTOLIC BLOOD PRESSURE: 131 MMHG | TEMPERATURE: 98.2 F | WEIGHT: 177.91 LBS | BODY MASS INDEX: 28.26 KG/M2

## 2023-11-20 DIAGNOSIS — Z45.2 ENCOUNTER FOR ADJUSTMENT OR MANAGEMENT OF VASCULAR ACCESS DEVICE: ICD-10-CM

## 2023-11-20 DIAGNOSIS — K20.90 ESOPHAGITIS: ICD-10-CM

## 2023-11-20 DIAGNOSIS — C15.5 MALIGNANT NEOPLASM OF LOWER THIRD OF ESOPHAGUS: Primary | ICD-10-CM

## 2023-11-20 DIAGNOSIS — G89.3 NEOPLASM RELATED PAIN: ICD-10-CM

## 2023-11-20 DIAGNOSIS — E86.0 DEHYDRATION: ICD-10-CM

## 2023-11-20 LAB
ALBUMIN SERPL-MCNC: 3.6 G/DL (ref 3.5–5.2)
ALBUMIN/GLOB SERPL: 1.4 G/DL
ALP SERPL-CCNC: 57 U/L (ref 39–117)
ALT SERPL W P-5'-P-CCNC: 9 U/L (ref 1–41)
ANION GAP SERPL CALCULATED.3IONS-SCNC: 7 MMOL/L (ref 5–15)
AST SERPL-CCNC: 13 U/L (ref 1–40)
BASOPHILS # BLD AUTO: 0.01 10*3/MM3 (ref 0–0.2)
BASOPHILS NFR BLD AUTO: 0.3 % (ref 0–1.5)
BILIRUB SERPL-MCNC: 0.4 MG/DL (ref 0–1.2)
BUN SERPL-MCNC: 19 MG/DL (ref 6–20)
BUN/CREAT SERPL: 33.3 (ref 7–25)
CALCIUM SPEC-SCNC: 9 MG/DL (ref 8.6–10.5)
CHLORIDE SERPL-SCNC: 101 MMOL/L (ref 98–107)
CO2 SERPL-SCNC: 27 MMOL/L (ref 22–29)
CREAT SERPL-MCNC: 0.57 MG/DL (ref 0.76–1.27)
DEPRECATED RDW RBC AUTO: 44.8 FL (ref 37–54)
EGFRCR SERPLBLD CKD-EPI 2021: 112.9 ML/MIN/1.73
EOSINOPHIL # BLD AUTO: 0.08 10*3/MM3 (ref 0–0.4)
EOSINOPHIL NFR BLD AUTO: 2.3 % (ref 0.3–6.2)
ERYTHROCYTE [DISTWIDTH] IN BLOOD BY AUTOMATED COUNT: 13.6 % (ref 12.3–15.4)
GLOBULIN UR ELPH-MCNC: 2.5 GM/DL
GLUCOSE SERPL-MCNC: 132 MG/DL (ref 65–99)
HCT VFR BLD AUTO: 39 % (ref 37.5–51)
HGB BLD-MCNC: 13.3 G/DL (ref 13–17.7)
IMM GRANULOCYTES # BLD AUTO: 0 10*3/MM3 (ref 0–0.05)
IMM GRANULOCYTES NFR BLD AUTO: 0 % (ref 0–0.5)
LYMPHOCYTES # BLD AUTO: 0.39 10*3/MM3 (ref 0.7–3.1)
LYMPHOCYTES NFR BLD AUTO: 11.1 % (ref 19.6–45.3)
MCH RBC QN AUTO: 31.1 PG (ref 26.6–33)
MCHC RBC AUTO-ENTMCNC: 34.1 G/DL (ref 31.5–35.7)
MCV RBC AUTO: 91.1 FL (ref 79–97)
MONOCYTES # BLD AUTO: 0.56 10*3/MM3 (ref 0.1–0.9)
MONOCYTES NFR BLD AUTO: 16 % (ref 5–12)
NEUTROPHILS NFR BLD AUTO: 2.47 10*3/MM3 (ref 1.7–7)
NEUTROPHILS NFR BLD AUTO: 70.3 % (ref 42.7–76)
PLATELET # BLD AUTO: 155 10*3/MM3 (ref 140–450)
PMV BLD AUTO: 10.4 FL (ref 6–12)
POTASSIUM SERPL-SCNC: 3.7 MMOL/L (ref 3.5–5.2)
PROT SERPL-MCNC: 6.1 G/DL (ref 6–8.5)
RAD ONC ARIA COURSE ID: NORMAL
RAD ONC ARIA COURSE INTENT: NORMAL
RAD ONC ARIA COURSE LAST TREATMENT DATE: NORMAL
RAD ONC ARIA COURSE START DATE: NORMAL
RAD ONC ARIA COURSE TREATMENT ELAPSED DAYS: 35
RAD ONC ARIA FIRST TREATMENT DATE: NORMAL
RAD ONC ARIA PLAN FRACTIONS TREATED TO DATE: 21
RAD ONC ARIA PLAN ID: NORMAL
RAD ONC ARIA PLAN PRESCRIBED DOSE PER FRACTION: 1.8 GY
RAD ONC ARIA PLAN PRIMARY REFERENCE POINT: NORMAL
RAD ONC ARIA PLAN TOTAL FRACTIONS PRESCRIBED: 25
RAD ONC ARIA PLAN TOTAL PRESCRIBED DOSE: 4500 CGY
RAD ONC ARIA REFERENCE POINT DOSAGE GIVEN TO DATE: 37.8 GY
RAD ONC ARIA REFERENCE POINT ID: NORMAL
RAD ONC ARIA REFERENCE POINT SESSION DOSAGE GIVEN: 1.8 GY
RBC # BLD AUTO: 4.28 10*6/MM3 (ref 4.14–5.8)
SODIUM SERPL-SCNC: 135 MMOL/L (ref 136–145)
WBC NRBC COR # BLD AUTO: 3.51 10*3/MM3 (ref 3.4–10.8)

## 2023-11-20 PROCEDURE — 25010000002 DEXAMETHASONE SODIUM PHOSPHATE 120 MG/30ML SOLUTION: Performed by: INTERNAL MEDICINE

## 2023-11-20 PROCEDURE — 25810000003 SODIUM CHLORIDE 0.9 % SOLUTION 250 ML FLEX CONT: Performed by: INTERNAL MEDICINE

## 2023-11-20 PROCEDURE — 96417 CHEMO IV INFUS EACH ADDL SEQ: CPT

## 2023-11-20 PROCEDURE — 80053 COMPREHEN METABOLIC PANEL: CPT | Performed by: INTERNAL MEDICINE

## 2023-11-20 PROCEDURE — 96375 TX/PRO/DX INJ NEW DRUG ADDON: CPT

## 2023-11-20 PROCEDURE — 77014 CHG CT GUIDANCE RADIATION THERAPY FLDS PLACEMENT: CPT | Performed by: RADIOLOGY

## 2023-11-20 PROCEDURE — 96413 CHEMO IV INFUSION 1 HR: CPT

## 2023-11-20 PROCEDURE — 85025 COMPLETE CBC W/AUTO DIFF WBC: CPT | Performed by: INTERNAL MEDICINE

## 2023-11-20 PROCEDURE — 25010000002 DIPHENHYDRAMINE PER 50 MG: Performed by: INTERNAL MEDICINE

## 2023-11-20 PROCEDURE — 25010000002 HEPARIN LOCK FLUSH PER 10 UNITS: Performed by: INTERNAL MEDICINE

## 2023-11-20 PROCEDURE — 25010000002 CARBOPLATIN PER 50 MG: Performed by: INTERNAL MEDICINE

## 2023-11-20 PROCEDURE — 77386: CPT | Performed by: RADIOLOGY

## 2023-11-20 PROCEDURE — 96361 HYDRATE IV INFUSION ADD-ON: CPT

## 2023-11-20 PROCEDURE — 25010000002 PACLITAXEL PER 1 MG: Performed by: INTERNAL MEDICINE

## 2023-11-20 PROCEDURE — 25010000002 PALONOSETRON PER 25 MCG: Performed by: INTERNAL MEDICINE

## 2023-11-20 PROCEDURE — 25810000003 SODIUM CHLORIDE 0.9 % SOLUTION: Performed by: INTERNAL MEDICINE

## 2023-11-20 RX ORDER — FAMOTIDINE 10 MG/ML
20 INJECTION, SOLUTION INTRAVENOUS AS NEEDED
Status: DISCONTINUED | OUTPATIENT
Start: 2023-11-20 | End: 2023-11-21 | Stop reason: HOSPADM

## 2023-11-20 RX ORDER — FAMOTIDINE 10 MG/ML
20 INJECTION, SOLUTION INTRAVENOUS ONCE
Status: COMPLETED | OUTPATIENT
Start: 2023-11-20 | End: 2023-11-20

## 2023-11-20 RX ORDER — HEPARIN SODIUM (PORCINE) LOCK FLUSH IV SOLN 100 UNIT/ML 100 UNIT/ML
500 SOLUTION INTRAVENOUS AS NEEDED
Status: DISCONTINUED | OUTPATIENT
Start: 2023-11-20 | End: 2023-11-21 | Stop reason: HOSPADM

## 2023-11-20 RX ORDER — FAMOTIDINE 10 MG/ML
20 INJECTION, SOLUTION INTRAVENOUS ONCE
Status: CANCELLED | OUTPATIENT
Start: 2023-11-20

## 2023-11-20 RX ORDER — PALONOSETRON 0.05 MG/ML
0.25 INJECTION, SOLUTION INTRAVENOUS ONCE
Status: CANCELLED | OUTPATIENT
Start: 2023-11-20

## 2023-11-20 RX ORDER — SODIUM CHLORIDE 0.9 % (FLUSH) 0.9 %
20 SYRINGE (ML) INJECTION AS NEEDED
OUTPATIENT
Start: 2023-11-20

## 2023-11-20 RX ORDER — SODIUM CHLORIDE 9 MG/ML
250 INJECTION, SOLUTION INTRAVENOUS ONCE
Status: CANCELLED | OUTPATIENT
Start: 2023-11-20

## 2023-11-20 RX ORDER — DIPHENHYDRAMINE HYDROCHLORIDE 50 MG/ML
50 INJECTION INTRAMUSCULAR; INTRAVENOUS AS NEEDED
Status: CANCELLED | OUTPATIENT
Start: 2023-11-20

## 2023-11-20 RX ORDER — PALONOSETRON 0.05 MG/ML
0.25 INJECTION, SOLUTION INTRAVENOUS ONCE
Status: COMPLETED | OUTPATIENT
Start: 2023-11-20 | End: 2023-11-20

## 2023-11-20 RX ORDER — FAMOTIDINE 10 MG/ML
20 INJECTION, SOLUTION INTRAVENOUS AS NEEDED
Status: CANCELLED | OUTPATIENT
Start: 2023-11-20

## 2023-11-20 RX ORDER — OXYCODONE HYDROCHLORIDE AND ACETAMINOPHEN 5; 325 MG/1; MG/1
2 TABLET ORAL EVERY 4 HOURS PRN
Qty: 120 TABLET | Refills: 0 | Status: SHIPPED | OUTPATIENT
Start: 2023-11-20

## 2023-11-20 RX ORDER — SODIUM CHLORIDE 9 MG/ML
250 INJECTION, SOLUTION INTRAVENOUS ONCE
Status: COMPLETED | OUTPATIENT
Start: 2023-11-20 | End: 2023-11-20

## 2023-11-20 RX ORDER — SODIUM CHLORIDE 0.9 % (FLUSH) 0.9 %
20 SYRINGE (ML) INJECTION AS NEEDED
Status: DISCONTINUED | OUTPATIENT
Start: 2023-11-20 | End: 2023-11-21 | Stop reason: HOSPADM

## 2023-11-20 RX ORDER — HEPARIN SODIUM (PORCINE) LOCK FLUSH IV SOLN 100 UNIT/ML 100 UNIT/ML
500 SOLUTION INTRAVENOUS AS NEEDED
Status: CANCELLED | OUTPATIENT
Start: 2023-11-20

## 2023-11-20 RX ORDER — DIPHENHYDRAMINE HYDROCHLORIDE 50 MG/ML
50 INJECTION INTRAMUSCULAR; INTRAVENOUS AS NEEDED
Status: DISCONTINUED | OUTPATIENT
Start: 2023-11-20 | End: 2023-11-21 | Stop reason: HOSPADM

## 2023-11-20 RX ADMIN — Medication 20 ML: at 13:44

## 2023-11-20 RX ADMIN — CARBOPLATIN 300 MG: 10 INJECTION, SOLUTION INTRAVENOUS at 13:12

## 2023-11-20 RX ADMIN — DEXAMETHASONE SODIUM PHOSPHATE 12 MG: 4 INJECTION, SOLUTION INTRA-ARTICULAR; INTRALESIONAL; INTRAMUSCULAR; INTRAVENOUS; SOFT TISSUE at 11:08

## 2023-11-20 RX ADMIN — HEPARIN SODIUM (PORCINE) LOCK FLUSH IV SOLN 100 UNIT/ML 500 UNITS: 100 SOLUTION at 13:45

## 2023-11-20 RX ADMIN — DIPHENHYDRAMINE HYDROCHLORIDE 25 MG: 50 INJECTION, SOLUTION INTRAMUSCULAR; INTRAVENOUS at 11:25

## 2023-11-20 RX ADMIN — FAMOTIDINE 20 MG: 10 INJECTION INTRAVENOUS at 11:05

## 2023-11-20 RX ADMIN — SODIUM CHLORIDE 250 ML: 9 INJECTION, SOLUTION INTRAVENOUS at 11:03

## 2023-11-20 RX ADMIN — PACLITAXEL 105 MG: 6 INJECTION, SOLUTION INTRAVENOUS at 11:56

## 2023-11-20 RX ADMIN — PALONOSETRON 0.25 MG: 0.05 INJECTION, SOLUTION INTRAVENOUS at 11:03

## 2023-11-20 RX ADMIN — SODIUM CHLORIDE 1000 ML: 9 INJECTION, SOLUTION INTRAVENOUS at 10:37

## 2023-11-20 NOTE — ADDENDUM NOTE
Encounter addended by: Jazmin Benitez RN on: 11/20/2023 2:04 PM   Actions taken: Charge Capture section accepted

## 2023-11-20 NOTE — PROGRESS NOTES
Chief Complaint  Malignant neoplasm of lower third of esophagus    Shyanne Grijalva, Shyanne Woods, LAUREN    Subjective          Shashank Saunders presents to [facility]EMATOLOGY [facility]OGY for GEJ squamous cell carcinoma    Mr. 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  Patient has been started on chemoradiation. He is due for Cycle 5 of chemotherapy with weekly Carboplatin and Paclitaxel, which is last cycle. Patient reports continued and worsening constant burning pain in chest and abdomen. He has been using liquid carafate and has recently started magic mouthwash as a swallow. Plan to increase pain meds to every 4 hours from every 6.  Continue protonix twice daily. No mouth sores or mouth pain. No fevers, chills, diarrhea, constipation, nausea, or neuropathy. Tolerating chemo well overall.       Oncology/Hematology History Overview Note   9/8/23: Presented to [facility] ED with inability to tolerate solids and 40 lb weight loss. CT Chest showed CT scan of the chest with IV contrast demonstrating moderately dilated fluid-filled esophagus. Emphysema. Part solid opacity 6 mm in the right upper lobe.     9/9/23: EGD performed showing an ulcerated and irregular severe stenosis in the lower third of the esophagus, that was dilated and biopsied. GEJ biopsy showing poorly differentiated squamous cell carcinoma.     10/3/23: NM PET: 1.8 cm hypermetabolic mass in the distal esophagus near the GE junction consistent with the patient's history of esophageal carcinoma.  No evidence of metastatic disease.    10/16/23: C1D1 Carboplatin and Paclitaxel. D1 of XRT.      Cycle 2 delayed due to flu. 10/30/23: C2D1 Carbo/Paclitaxel    11/20/23:  C5D1 Carboplatin/Paclitaxle. Last cycle     Malignant neoplasm of lower third of esophagus   9/14/2023 Initial Diagnosis    Malignant neoplasm of lower third of esophagus     10/5/2023 Cancer Staged    Staging form: Esophagus - Squamous Cell Carcinoma, AJCC 8th Edition  - Clinical: Stage II (cT2, cN0, cM0) - Signed by Winston Johnson MD on 10/5/2023     10/5/2023 -  Radiation    RADIATION THERAPY Treatment Details (Noted on 10/5/2023)  Site: Esophagus - Lower  Technique: IMRT  Goal: No goal specified  Planned Treatment Start Date: No planned start date specified     10/16/2023 -  Chemotherapy    OP GE PACLitaxel / CARBOplatin (weekly) + XRT     11/20/2023 -  Chemotherapy    OP SUPPORTIVE HYDRATION + ANTIEMETICS           Review of Systems   Constitutional:  Positive for appetite change (Decreased) and fatigue. Negative for diaphoresis, fever, unexpected weight gain and unexpected weight loss.   HENT:  Positive for sore throat and trouble swallowing. Negative for hearing loss, mouth sores, swollen glands and voice change.    Eyes:  Negative for blurred vision.   Respiratory:  Negative for cough, shortness of breath and wheezing.    Cardiovascular:  Positive for chest pain. Negative for palpitations.   Gastrointestinal:  Negative for abdominal pain, blood in stool, constipation, diarrhea, nausea and vomiting.   Endocrine: Negative for cold intolerance and heat intolerance.   Genitourinary:  Positive for flank pain (RT side). Negative for difficulty urinating, dysuria, frequency, hematuria and urinary incontinence.   Musculoskeletal:  Positive for arthralgias and back pain. Negative for myalgias.   Skin:  Negative for rash, skin lesions and wound.   Neurological:  Positive for weakness. Negative for dizziness, seizures, numbness and headache.   Hematological:  Does not bruise/bleed easily.   Psychiatric/Behavioral:  Negative for depressed mood. The patient is nervous/anxious.    All other systems reviewed and  are negative.    Current Outpatient Medications on File Prior to Visit   Medication Sig Dispense Refill    albuterol (PROVENTIL) (2.5 MG/3ML) 0.083% nebulizer solution Inhale 2.5 mg.      aspirin 81 MG EC tablet Take 1 tablet by mouth Daily. 90 tablet 3    atorvastatin (LIPITOR) 40 MG tablet Take 1 tablet by mouth Every Night. 30 tablet 5    benzonatate (Tessalon Perles) 100 MG capsule Take 1 capsule by mouth 3 (Three) Times a Day As Needed for Cough. 90 capsule 2    carvedilol (COREG) 12.5 MG tablet Take 1 tablet by mouth Every 12 (Twelve) Hours.      Eliquis 5 MG tablet tablet Take 1 tablet by mouth Every 12 (Twelve) Hours.      lisinopril (PRINIVIL,ZESTRIL) 10 MG tablet Take 1 tablet by mouth Daily.      metFORMIN (GLUCOPHAGE) 500 MG tablet Take 1 tablet by mouth 2 (Two) Times a Day With Meals.      Nystatin-Diphenhydramine-Hydrocortisone-Lidocaine Take 5 mL by mouth Every 3 (Three) Hours As Needed (Sore throat, trouble swallowing). Swish and swallow. 300 mL 2    ondansetron (ZOFRAN) 8 MG tablet Take 1 tablet by mouth 3 (Three) Times a Day As Needed for Nausea or Vomiting. 30 tablet 5    ondansetron ODT (ZOFRAN-ODT) 4 MG disintegrating tablet Place 1 tablet on the tongue Every 8 (Eight) Hours As Needed.      oxyCODONE-acetaminophen (PERCOCET) 5-325 MG per tablet Take 2 tablets by mouth Every 6 (Six) Hours As Needed for Moderate Pain. 120 tablet 0    pantoprazole (PROTONIX) 40 MG EC tablet Take 1 tablet by mouth 2 (Two) Times a Day Before Meals. 60 tablet 2    QUEtiapine (SEROquel) 200 MG tablet Take 1 tablet by mouth Every Night.      sucralfate (Carafate) 1 GM/10ML suspension Take 10 mL by mouth 4 (Four) Times a Day. 414 mL 5     No current facility-administered medications on file prior to visit.       No Known Allergies  Past Medical History:   Diagnosis Date    Asthma     Congestive heart failure (CHF)     Coronary artery disease     Diabetes mellitus     Esophageal cancer     Heart attack     Hypertension       Past Surgical History:   Procedure Laterality Date    CARDIAC CATHETERIZATION      CARDIAC SURGERY      Stent x1    ENDOSCOPY N/A 2023    Procedure: ESOPHAGOGASTRODUODENOSCOPY WITH ESOPHAGEAL BALLOON DILATATION/BIOPSIES;  Surgeon: Gary Stein MD;  Location: [facility] ENDOSCOPY;  Service: Gastroenterology;  Laterality: N/A;  ULCERATED STRICTURE OF GE JUNCTION    VENOUS ACCESS DEVICE (PORT) INSERTION Left 10/12/2023    Procedure: INSERTION VENOUS ACCESS DEVICE;  Surgeon: Yung Montejo MD;  Location: [facility] MAIN OR;  Service: General;  Laterality: Left;     Social History     Socioeconomic History    Marital status:    Tobacco Use    Smoking status: Every Day     Packs/day: .5     Types: Cigarettes     Start date:     Smokeless tobacco: Never   Vaping Use    Vaping Use: Never used   Substance and Sexual Activity    Alcohol use: Not Currently    Drug use: Not Currently     Types: Cocaine(coke), Marijuana    Sexual activity: Defer     History reviewed. No pertinent family history.    Objective   Physical Exam  Appears in pain, sitting with head down, on RA  Anicteric sclerae, no rash on exposed skin  Respirations non-labored  + dupuytren's contracture left hand  Awake, alert, and oriented x 4. No gross neurologic deficit  Appropriate mood and affect    ECO    Vitals:    23 1008   BP: 131/89   Pulse: 73   Resp: 17   Temp: 98.2 °F (36.8 °C)   TempSrc: Temporal   SpO2: 98%   Weight: 80.7 kg (177 lb 14.6 oz)   PainSc: 10-Worst pain ever   PainLoc: Abdomen           ECOG score: 0         PHQ-9 Total Score: 0         Result Review :   The following data was reviewed by: Winston Johnson MD on 23:  Lab Results   Component Value Date    HGB 13.3 2023    HCT 39.0 2023    MCV 91.1 2023     2023    WBC 3.51 2023    NEUTROABS 2.47 2023    LYMPHSABS 0.39 (L) 2023    MONOSABS 0.56 2023    EOSABS 0.08 2023    BASOSABS 0.01  11/20/2023     Lab Results   Component Value Date    GLUCOSE 132 (H) 11/20/2023    BUN 19 11/20/2023    CREATININE 0.57 (L) 11/20/2023     (L) 11/20/2023    K 3.7 11/20/2023     11/20/2023    CO2 27.0 11/20/2023    CALCIUM 9.0 11/20/2023    PROTEINTOT 6.1 11/20/2023    ALBUMIN 3.6 11/20/2023    BILITOT 0.4 11/20/2023    ALKPHOS 57 11/20/2023    AST 13 11/20/2023    ALT 9 11/20/2023     Lab Results   Component Value Date    MG 1.8 09/09/2023     Labs personally reviewed and essentially normal.     10/17/23 Rad onc note personally reviewed    No radiology results for the last 30 days.        Assessment and Plan    Diagnoses and all orders for this visit:    1. Malignant neoplasm of lower third of esophagus (Primary)  -     NM PET/CT Skull Base to Mid Thigh; Future  -     oxyCODONE-acetaminophen (PERCOCET) 5-325 MG per tablet; Take 2 tablets by mouth Every 4 (Four) Hours As Needed for Moderate Pain.  Dispense: 120 tablet; Refill: 0    2. Esophagitis    3. Neoplasm related pain    Other orders  -     sodium chloride 0.9 % bolus 1,000 mL  -     sodium chloride 0.9 % infusion 250 mL  -     famotidine (PEPCID) injection 20 mg  -     diphenhydrAMINE (BENADRYL) IVPB 25 mg  -     palonosetron (ALOXI) injection 0.25 mg  -     dexAMETHasone (DECADRON) 12 mg in sodium chloride 0.9 % IVPB  -     PACLitaxel (TAXOL) 105 mg in sodium chloride 0.9 % 267.5 mL chemo IVPB  -     CARBOplatin (PARAPLATIN) 300 mg in sodium chloride 0.9 % 130 mL chemo IVPB  -     Hydrocortisone Sod Suc (PF) (Solu-CORTEF) injection 100 mg  -     diphenhydrAMINE (BENADRYL) injection 50 mg  -     famotidine (PEPCID) injection 20 mg          GEJ squamous cell carcinoma  CT Chest without any metastatic disease, PET scan 10/3/23 for complete staging confirmed localized disease.  He has been seen by Dr. Chau with thoracic surgery and Dr. George with radiation oncology. Local disease treated with neoadjuvant chemoradiation followed by consideration  for surgery. Chemotherapy with Carboplatin and Paclitaxel given weekly. First dose 10/16/23. PRN anti-emetics provided.     11/13/23: C5D1 Carboplatin and Paclitaxel. Labs appropriate to proceed    This represents last cycle of chemotherapy. PET 4 weeks post-treatment ordered. RTC at that time. Patient to follow up with Dr. Chau after this PET as well for consideration of esophagectomy.     Neoplasm related pain  Continue Percocet 5-325 mg, two pills. Increase to every 4 hour PRN due to increased pain from radiatoin. Continue PRN NSAID.      Subacute CVA  10/5/23 MRI brain performed due to concern for seizure and showed likely bilateral subacute infarcts. Atorvastatin 40 mg daily. Continue ASA 81 mg daily. Patient already on eliquis.     Esophagitis  Protonix BID already ordered. Worse 2/2 radiation. Add liquid carafate as needed. Also taking magic mouthwash.     This is an acute or chronic illness that poses a threat to life or bodily function. The above treatment plan involves a high risk of complications and/or mortality of patient management. Continue to monitor for severe toxicities with frequent labs and office visits.      I spent 25 minutes caring for Shashank on this date of service. This time includes time spent by me in the following activities:preparing for the visit, reviewing tests, obtaining and/or reviewing a separately obtained history, performing a medically appropriate examination and/or evaluation , counseling and educating the patient/family/caregiver, ordering medications, tests, or procedures, referring and communicating with other health care professionals , documenting information in the medical record, independently interpreting results and communicating that information with the patient/family/caregiver, and care coordination    Patient Follow Up: after PET  Patient was given instructions and counseling regarding his condition or for health maintenance advice. Please see specific  information pulled into the AVS if appropriate.

## 2023-11-21 ENCOUNTER — HOSPITAL ENCOUNTER (OUTPATIENT)
Dept: RADIATION ONCOLOGY | Facility: HOSPITAL | Age: 58
Discharge: HOME | End: 2023-11-21

## 2023-11-21 LAB
RAD ONC ARIA COURSE ID: NORMAL
RAD ONC ARIA COURSE INTENT: NORMAL
RAD ONC ARIA COURSE LAST TREATMENT DATE: NORMAL
RAD ONC ARIA COURSE START DATE: NORMAL
RAD ONC ARIA COURSE TREATMENT ELAPSED DAYS: 36
RAD ONC ARIA FIRST TREATMENT DATE: NORMAL
RAD ONC ARIA PLAN FRACTIONS TREATED TO DATE: 22
RAD ONC ARIA PLAN ID: NORMAL
RAD ONC ARIA PLAN PRESCRIBED DOSE PER FRACTION: 1.8 GY
RAD ONC ARIA PLAN PRIMARY REFERENCE POINT: NORMAL
RAD ONC ARIA PLAN TOTAL FRACTIONS PRESCRIBED: 25
RAD ONC ARIA PLAN TOTAL PRESCRIBED DOSE: 4500 CGY
RAD ONC ARIA REFERENCE POINT DOSAGE GIVEN TO DATE: 39.6 GY
RAD ONC ARIA REFERENCE POINT ID: NORMAL
RAD ONC ARIA REFERENCE POINT SESSION DOSAGE GIVEN: 1.8 GY

## 2023-11-21 PROCEDURE — 77386: CPT | Performed by: RADIOLOGY

## 2023-11-21 PROCEDURE — 77014 CHG CT GUIDANCE RADIATION THERAPY FLDS PLACEMENT: CPT | Performed by: RADIOLOGY

## 2023-11-22 ENCOUNTER — DOCUMENTATION (OUTPATIENT)
Dept: NUTRITION | Facility: HOSPITAL | Age: 58
End: 2023-11-22
Payer: MEDICARE

## 2023-11-22 ENCOUNTER — HOSPITAL ENCOUNTER (OUTPATIENT)
Dept: RADIATION ONCOLOGY | Facility: HOSPITAL | Age: 58
Discharge: HOME | End: 2023-11-22

## 2023-11-22 VITALS — BODY MASS INDEX: 28.29 KG/M2 | WEIGHT: 178.13 LBS

## 2023-11-22 LAB
RAD ONC ARIA COURSE ID: NORMAL
RAD ONC ARIA COURSE INTENT: NORMAL
RAD ONC ARIA COURSE LAST TREATMENT DATE: NORMAL
RAD ONC ARIA COURSE START DATE: NORMAL
RAD ONC ARIA COURSE TREATMENT ELAPSED DAYS: 37
RAD ONC ARIA FIRST TREATMENT DATE: NORMAL
RAD ONC ARIA PLAN FRACTIONS TREATED TO DATE: 23
RAD ONC ARIA PLAN ID: NORMAL
RAD ONC ARIA PLAN PRESCRIBED DOSE PER FRACTION: 1.8 GY
RAD ONC ARIA PLAN PRIMARY REFERENCE POINT: NORMAL
RAD ONC ARIA PLAN TOTAL FRACTIONS PRESCRIBED: 25
RAD ONC ARIA PLAN TOTAL PRESCRIBED DOSE: 4500 CGY
RAD ONC ARIA REFERENCE POINT DOSAGE GIVEN TO DATE: 41.4 GY
RAD ONC ARIA REFERENCE POINT ID: NORMAL
RAD ONC ARIA REFERENCE POINT SESSION DOSAGE GIVEN: 1.8 GY

## 2023-11-22 PROCEDURE — 77014 CHG CT GUIDANCE RADIATION THERAPY FLDS PLACEMENT: CPT | Performed by: RADIOLOGY

## 2023-11-22 PROCEDURE — 77386: CPT | Performed by: RADIOLOGY

## 2023-11-22 NOTE — PROGRESS NOTES
Outpatient Nutrition Oncology Follow Up    Patient Name: Shashank Saunders  YOB: 1965  MRN: [id number]    Recommendation(s):   High kcal, high protein small / frequent meals of only soft, moist foods.  Boost VHC or Ensure Complete 4 X day (or either ONS made as a milkshake TID).  Adequate fluids.  If pt tolerates the Benecalorie supplement, will recommend 1-2 packets daily added to moist foods or shakes.      Reason for Consult:  Radiation Tx F/U       Today's Weight:   80.8 kg    Weight Change:    1.7% difference / loss in the past week  3.8% total loss in 1 month    Nutrition-related concerns: Early Satiety, Dysphagia/odynophagia, and Esophagitis    Current PO intake:  soft foods     Current Nutrition Supplement intake:  (drinks a variety)   Boost VHC BID + Ensure Complete or Boost Plus BID (1760 kcals, 72 gm protein alone)    Consult or Intervention:  esophageal pn / burning continues, but pt is making a good effort to eat and drink oral nutrition shakes / milkshakes.  Provided with 2 samples of Benecalorie supplement (330 kcals, 7 gm protein per container).  Encouraged to mix into moist foods and / or milkshakes in small increments throughout the day- to minimize GI distress (majority of kcals come from fat).      Nutrition Diagnosis: Unintentional weight loss related to increased nutrient needs due to catabolic disease as evidenced by physiological causes increasing nutrient needs. and hypermetabolic state.    Plan: Will follow up per oncology nutrition protocol

## 2023-11-26 PROCEDURE — 77338 DESIGN MLC DEVICE FOR IMRT: CPT | Performed by: RADIOLOGY

## 2023-11-26 PROCEDURE — 77300 RADIATION THERAPY DOSE PLAN: CPT | Performed by: RADIOLOGY

## 2023-11-27 ENCOUNTER — HOSPITAL ENCOUNTER (OUTPATIENT)
Dept: RADIATION ONCOLOGY | Facility: HOSPITAL | Age: 58
Discharge: HOME | End: 2023-11-27
Payer: MEDICARE

## 2023-11-27 ENCOUNTER — HOSPITAL ENCOUNTER (OUTPATIENT)
Dept: INFUSION THERAPY | Facility: HOSPITAL | Age: 58
Discharge: HOME | End: 2023-11-27
Admitting: INTERNAL MEDICINE
Payer: MEDICARE

## 2023-11-27 VITALS
TEMPERATURE: 98.3 F | DIASTOLIC BLOOD PRESSURE: 81 MMHG | RESPIRATION RATE: 20 BRPM | HEIGHT: 69 IN | HEART RATE: 89 BPM | OXYGEN SATURATION: 96 % | WEIGHT: 168.43 LBS | SYSTOLIC BLOOD PRESSURE: 139 MMHG | BODY MASS INDEX: 24.95 KG/M2

## 2023-11-27 DIAGNOSIS — C15.5 MALIGNANT NEOPLASM OF LOWER THIRD OF ESOPHAGUS: Primary | ICD-10-CM

## 2023-11-27 DIAGNOSIS — C15.5 MALIGNANT NEOPLASM OF LOWER THIRD OF ESOPHAGUS: ICD-10-CM

## 2023-11-27 DIAGNOSIS — Z45.2 ENCOUNTER FOR ADJUSTMENT OR MANAGEMENT OF VASCULAR ACCESS DEVICE: Primary | ICD-10-CM

## 2023-11-27 LAB
RAD ONC ARIA COURSE ID: NORMAL
RAD ONC ARIA COURSE INTENT: NORMAL
RAD ONC ARIA COURSE LAST TREATMENT DATE: NORMAL
RAD ONC ARIA COURSE START DATE: NORMAL
RAD ONC ARIA COURSE TREATMENT ELAPSED DAYS: 42
RAD ONC ARIA FIRST TREATMENT DATE: NORMAL
RAD ONC ARIA PLAN FRACTIONS TREATED TO DATE: 24
RAD ONC ARIA PLAN ID: NORMAL
RAD ONC ARIA PLAN PRESCRIBED DOSE PER FRACTION: 1.8 GY
RAD ONC ARIA PLAN PRIMARY REFERENCE POINT: NORMAL
RAD ONC ARIA PLAN TOTAL FRACTIONS PRESCRIBED: 25
RAD ONC ARIA PLAN TOTAL PRESCRIBED DOSE: 4500 CGY
RAD ONC ARIA REFERENCE POINT DOSAGE GIVEN TO DATE: 43.2 GY
RAD ONC ARIA REFERENCE POINT ID: NORMAL
RAD ONC ARIA REFERENCE POINT SESSION DOSAGE GIVEN: 1.8 GY

## 2023-11-27 PROCEDURE — 96361 HYDRATE IV INFUSION ADD-ON: CPT

## 2023-11-27 PROCEDURE — 96360 HYDRATION IV INFUSION INIT: CPT

## 2023-11-27 PROCEDURE — 77386: CPT | Performed by: RADIOLOGY

## 2023-11-27 PROCEDURE — 25810000003 SODIUM CHLORIDE 0.9 % SOLUTION: Performed by: RADIOLOGY

## 2023-11-27 PROCEDURE — 25010000002 HEPARIN LOCK FLUSH PER 10 UNITS: Performed by: INTERNAL MEDICINE

## 2023-11-27 RX ORDER — SODIUM CHLORIDE 9 MG/ML
500 INJECTION, SOLUTION INTRAVENOUS ONCE
Status: CANCELLED | OUTPATIENT
Start: 2023-11-27

## 2023-11-27 RX ORDER — HEPARIN SODIUM (PORCINE) LOCK FLUSH IV SOLN 100 UNIT/ML 100 UNIT/ML
500 SOLUTION INTRAVENOUS AS NEEDED
OUTPATIENT
Start: 2023-11-27

## 2023-11-27 RX ORDER — SODIUM CHLORIDE 9 MG/ML
500 INJECTION, SOLUTION INTRAVENOUS ONCE
Status: COMPLETED | OUTPATIENT
Start: 2023-11-27 | End: 2023-11-27

## 2023-11-27 RX ORDER — SODIUM CHLORIDE 0.9 % (FLUSH) 0.9 %
20 SYRINGE (ML) INJECTION AS NEEDED
OUTPATIENT
Start: 2023-11-27

## 2023-11-27 RX ORDER — HEPARIN SODIUM (PORCINE) LOCK FLUSH IV SOLN 100 UNIT/ML 100 UNIT/ML
500 SOLUTION INTRAVENOUS AS NEEDED
Status: DISCONTINUED | OUTPATIENT
Start: 2023-11-27 | End: 2023-11-30 | Stop reason: HOSPADM

## 2023-11-27 RX ADMIN — HEPARIN 500 UNITS: 100 SYRINGE at 18:06

## 2023-11-27 RX ADMIN — SODIUM CHLORIDE 500 ML/HR: 9 INJECTION, SOLUTION INTRAVENOUS at 15:58

## 2023-11-30 DIAGNOSIS — C15.5 MALIGNANT NEOPLASM OF LOWER THIRD OF ESOPHAGUS: ICD-10-CM

## 2023-11-30 DIAGNOSIS — G89.3 NEOPLASM RELATED PAIN: ICD-10-CM

## 2023-11-30 RX ORDER — FLUCONAZOLE 100 MG/1
TABLET ORAL
Qty: 15 TABLET | Refills: 0 | Status: SHIPPED | OUTPATIENT
Start: 2023-11-30 | End: 2023-12-14

## 2023-11-30 NOTE — PROGRESS NOTES
Wife called in today to check in and return my call.  Wife states that patient has been taking in some bites of soup and taking in ice chips.  And that patient is feeling a little better but he is still having burning with any eating or drinking.  I informed Dr. Eduardo of patients complaints, she ordered Diflucan for a total of 14 days and to arrange patient to receive fluids tomorrow.  There was only one appointment available tomorrow for ONS at 0730.  Patients wife stated that would not work for them but that if patient presented to be dehydrated tomorrow or over the weekend, that she would either call his pcp to arrange fluids or take him in through the local ER.  Dr. Eduardo made aware.

## 2023-12-01 RX ORDER — SUCRALFATE ORAL 1 G/10ML
1 SUSPENSION ORAL 4 TIMES DAILY
Qty: 414 ML | Refills: 5 | Status: SHIPPED | OUTPATIENT
Start: 2023-12-01

## 2023-12-01 RX ORDER — OXYCODONE HYDROCHLORIDE AND ACETAMINOPHEN 5; 325 MG/1; MG/1
2 TABLET ORAL EVERY 4 HOURS PRN
Qty: 120 TABLET | Refills: 0 | Status: SHIPPED | OUTPATIENT
Start: 2023-12-01

## 2023-12-04 ENCOUNTER — TRANSCRIBE ORDERS (OUTPATIENT)
Dept: ADMINISTRATIVE | Facility: HOSPITAL | Age: 58
End: 2023-12-04
Payer: MEDICARE

## 2023-12-04 DIAGNOSIS — C15.9 MALIGNANT NEOPLASM OF ESOPHAGUS, UNSPECIFIED LOCATION: Primary | ICD-10-CM

## 2023-12-08 ENCOUNTER — TELEPHONE (OUTPATIENT)
Dept: RADIATION ONCOLOGY | Facility: HOSPITAL | Age: 58
End: 2023-12-08
Payer: MEDICARE

## 2023-12-08 NOTE — TELEPHONE ENCOUNTER
OSW received notification from radiation therapy that Mr. Saunders has not been showing to his treatment this week. They've not been able to reach him or his wife. Advised that the OSWs have not heard from Mr. Saunders ourselves. OSW attempted to reach Mr. Saunders via telephone this afternoon without success. Left a brief voicemail on two of the telephone numbers he has listed on file, providing my direct number where I may be reached back at to further address any barriers to care. OSW support remains available.

## 2023-12-12 DIAGNOSIS — C15.5 MALIGNANT NEOPLASM OF LOWER THIRD OF ESOPHAGUS: ICD-10-CM

## 2023-12-12 RX ORDER — OXYCODONE HYDROCHLORIDE AND ACETAMINOPHEN 5; 325 MG/1; MG/1
2 TABLET ORAL EVERY 4 HOURS PRN
Qty: 120 TABLET | Refills: 0 | Status: SHIPPED | OUTPATIENT
Start: 2023-12-12

## 2023-12-13 ENCOUNTER — TELEPHONE (OUTPATIENT)
Dept: ONCOLOGY | Facility: HOSPITAL | Age: 58
End: 2023-12-13
Payer: MEDICARE

## 2023-12-15 ENCOUNTER — DOCUMENTATION (OUTPATIENT)
Dept: ONCOLOGY | Facility: HOSPITAL | Age: 58
End: 2023-12-15
Payer: MEDICARE

## 2023-12-15 NOTE — PROGRESS NOTES
OSW attempted to contact patient at [phone]. Patient did not answer the phone but leaving a voice message was an option. OSW was able to leave her contact number and requested patient to contact to address his barriers to care and unmet needs that are effecting his follow through with completing his radiation oncology treatments and contacting to reschedule his PET scan and follow up appointments.

## 2023-12-22 DIAGNOSIS — C15.5 MALIGNANT NEOPLASM OF LOWER THIRD OF ESOPHAGUS: ICD-10-CM

## 2023-12-26 RX ORDER — OXYCODONE HYDROCHLORIDE AND ACETAMINOPHEN 5; 325 MG/1; MG/1
2 TABLET ORAL EVERY 4 HOURS PRN
Qty: 120 TABLET | Refills: 0 | Status: SHIPPED | OUTPATIENT
Start: 2023-12-26

## 2023-12-29 ENCOUNTER — HOSPITAL ENCOUNTER (OUTPATIENT)
Dept: PET IMAGING | Facility: HOSPITAL | Age: 58
Discharge: HOME | End: 2023-12-29
Payer: MEDICARE

## 2023-12-29 DIAGNOSIS — C15.5 MALIGNANT NEOPLASM OF LOWER THIRD OF ESOPHAGUS: ICD-10-CM

## 2023-12-29 PROCEDURE — 0 FLUDEOXYGLUCOSE F18 SOLUTION: Performed by: INTERNAL MEDICINE

## 2023-12-29 PROCEDURE — 78815 PET IMAGE W/CT SKULL-THIGH: CPT

## 2023-12-29 PROCEDURE — A9552 F18 FDG: HCPCS | Performed by: INTERNAL MEDICINE

## 2023-12-29 RX ADMIN — FLUDEOXYGLUCOSE F 18 1 DOSE: 200 INJECTION, SOLUTION INTRAVENOUS at 12:45

## (undated) DEVICE — SOL IRRG H2O PL/BG 1000ML STRL

## (undated) DEVICE — SOLIDIFIER LIQLOC PLS 1500CC BT

## (undated) DEVICE — SLV SCD KN/LEN ADJ EXPRSS BLENDED MD 1P/U

## (undated) DEVICE — INTENDED FOR TISSUE SEPARATION, AND OTHER PROCEDURES THAT REQUIRE A SHARP SURGICAL BLADE TO PUNCTURE OR CUT.: Brand: BARD-PARKER ® CARBON RIB-BACK BLADES

## (undated) DEVICE — GLV SURG SENSICARE PI LF PF 8 GRN STRL

## (undated) DEVICE — Device: Brand: DEFENDO AIR/WATER/SUCTION AND BIOPSY VALVE

## (undated) DEVICE — DEV INFL CRE STERIFLATE 60CC DISP

## (undated) DEVICE — SINGLE-USE BIOPSY FORCEPS: Brand: RADIAL JAW 4

## (undated) DEVICE — ESOPHAGEAL BALLOON DILATATION CATHETER: Brand: CRE FIXED WIRE

## (undated) DEVICE — ADHS SKIN SURG TISS VISC PREMIERPRO EXOFIN HI/VISC FAST/DRY

## (undated) DEVICE — GLV SURG SENSICARE SLT PF LF 8 STRL

## (undated) DEVICE — LINER SURG CANSTR SXN S/RIGD 1500CC

## (undated) DEVICE — SUT MNCRYL PLS ANTIB UD 4/0 PS2 18IN

## (undated) DEVICE — VASCULAR ACCESS-LF: Brand: MEDLINE INDUSTRIES, INC.

## (undated) DEVICE — ELECTRD BLD EDGE/INSUL1P 2.4X5.1MM STRL

## (undated) DEVICE — SUT VIC 3/0 SH 27IN J416H

## (undated) DEVICE — CONN JET HYDRA H20 AUXILIARY DISP

## (undated) DEVICE — Device

## (undated) DEVICE — SOL IRR NACL 0.9PCT BT 1000ML

## (undated) DEVICE — BLCK/BITE BLOX WO/DENTL/RIM W/STRAP 54F